# Patient Record
Sex: MALE | Race: WHITE | Employment: OTHER | URBAN - METROPOLITAN AREA
[De-identification: names, ages, dates, MRNs, and addresses within clinical notes are randomized per-mention and may not be internally consistent; named-entity substitution may affect disease eponyms.]

---

## 2017-02-21 ENCOUNTER — APPOINTMENT (OUTPATIENT)
Dept: GENERAL RADIOLOGY | Facility: HOSPITAL | Age: 82
End: 2017-02-21
Attending: EMERGENCY MEDICINE
Payer: MEDICARE

## 2017-02-21 ENCOUNTER — HOSPITAL ENCOUNTER (EMERGENCY)
Facility: HOSPITAL | Age: 82
Discharge: HOME | End: 2017-02-21
Attending: EMERGENCY MEDICINE
Payer: MEDICARE

## 2017-02-21 VITALS
BODY MASS INDEX: 22.51 KG/M2 | RESPIRATION RATE: 14 BRPM | HEIGHT: 69 IN | HEART RATE: 78 BPM | WEIGHT: 152 LBS | SYSTOLIC BLOOD PRESSURE: 119 MMHG | DIASTOLIC BLOOD PRESSURE: 71 MMHG | TEMPERATURE: 98 F | OXYGEN SATURATION: 99 %

## 2017-02-21 DIAGNOSIS — S63.614A SPRAIN OF RIGHT RING FINGER, UNSPECIFIED SITE OF FINGER, INITIAL ENCOUNTER: Primary | ICD-10-CM

## 2017-02-21 PROCEDURE — 99283 EMERGENCY DEPT VISIT LOW MDM: CPT

## 2017-02-21 PROCEDURE — 29130 APPL FINGER SPLINT STATIC: CPT

## 2017-02-21 PROCEDURE — 73140 X-RAY EXAM OF FINGER(S): CPT

## 2017-02-21 NOTE — ED PROVIDER NOTES
Patient Seen in: [facility] Emergency Department    History   Patient presents with:  Upper Extremity Injury (musculoskeletal)    Stated Complaint: broken finger    HPI    Patient presents with a right fourth digit injury.   The patient was walking h mouth 2 (two) times daily as needed. Clopidogrel Bisulfate (PLAVIX) 75 MG Oral Tab,  Take 1 tablet by mouth  daily   Magnesium 200 MG Oral Tab,  Take  by mouth. Cholecalciferol (VITAMIN D OR),  Take 1 tablet by mouth daily.    B Complex Vitamins (VITAMI views of the finger were obtained. PATIENT STATED HISTORY:  Patient was walking the dog on Sunday and got his right fourth finger caught in the leash.   He has a previous injury to his fifth finger with a pin.         2/21/2017  CONCLUSION:      No acute f

## 2017-02-21 NOTE — ED INITIAL ASSESSMENT (HPI)
Right hand fourth digit injury on Sunday. Was walking his dog and tightened the leash around his hand and dog pulled against leash and he heard a pop. Patient wishes to have finger evaluated because he has a ski trip planned.

## 2017-07-14 ENCOUNTER — HOSPITAL ENCOUNTER (OUTPATIENT)
Dept: MRI IMAGING | Facility: HOSPITAL | Age: 82
Discharge: HOME | End: 2017-07-14
Attending: INTERNAL MEDICINE
Payer: MEDICARE

## 2017-07-14 DIAGNOSIS — R41.3 MEMORY LOSS: ICD-10-CM

## 2017-07-14 PROCEDURE — 70553 MRI BRAIN STEM W/O & W/DYE: CPT | Performed by: INTERNAL MEDICINE

## 2017-07-14 PROCEDURE — A9575 INJ GADOTERATE MEGLUMI 0.1ML: HCPCS | Performed by: INTERNAL MEDICINE

## 2017-07-19 ENCOUNTER — OFFICE VISIT (OUTPATIENT)
Dept: SLEEP CENTER | Facility: HOSPITAL | Age: 82
End: 2017-07-19
Attending: INTERNAL MEDICINE
Payer: MEDICARE

## 2017-07-19 PROCEDURE — 95810 POLYSOM 6/> YRS 4/> PARAM: CPT

## 2017-07-21 NOTE — PROCEDURES
[address] [address]       Accredited by the [facility] of [facility] ([facility])    PATIENT'S NAME:        Swapnil Núñez PHYSICIAN:   Sandra Muro M.D. REFERRING PHYSICIAN:   Amanda Landrum M.D.   DEBRA predominance was noted. The oxygen timo was 83%. The patient spent 98% of the record with a saturation of greater than 90%. PERIODIC LIMB MOVEMENTS:  PLM index was 12. PLM arousal index was 2.     EEG:  With the limited recording montage, no EEG abno

## 2017-08-14 ENCOUNTER — OFFICE VISIT (OUTPATIENT)
Dept: SLEEP CENTER | Facility: HOSPITAL | Age: 82
End: 2017-08-14
Attending: DENTIST
Payer: MEDICARE

## 2017-08-14 PROCEDURE — 95811 POLYSOM 6/>YRS CPAP 4/> PARM: CPT

## 2017-11-08 PROBLEM — G47.33 OSA (OBSTRUCTIVE SLEEP APNEA): Status: ACTIVE | Noted: 2017-11-08

## 2018-06-28 ENCOUNTER — HOSPITAL ENCOUNTER (OUTPATIENT)
Age: 83
Discharge: HOME | End: 2018-06-28
Attending: FAMILY MEDICINE
Payer: MEDICARE

## 2018-06-28 VITALS
RESPIRATION RATE: 18 BRPM | OXYGEN SATURATION: 95 % | SYSTOLIC BLOOD PRESSURE: 105 MMHG | DIASTOLIC BLOOD PRESSURE: 56 MMHG | TEMPERATURE: 98 F | HEART RATE: 77 BPM

## 2018-06-28 DIAGNOSIS — J02.9 SORE THROAT: ICD-10-CM

## 2018-06-28 DIAGNOSIS — J01.00 ACUTE NON-RECURRENT MAXILLARY SINUSITIS: Primary | ICD-10-CM

## 2018-06-28 PROCEDURE — 87430 STREP A AG IA: CPT | Performed by: FAMILY MEDICINE

## 2018-06-28 PROCEDURE — 87081 CULTURE SCREEN ONLY: CPT | Performed by: FAMILY MEDICINE

## 2018-06-28 PROCEDURE — 99214 OFFICE O/P EST MOD 30 MIN: CPT

## 2018-06-28 PROCEDURE — 99204 OFFICE O/P NEW MOD 45 MIN: CPT

## 2018-06-28 RX ORDER — CODEINE PHOSPHATE AND GUAIFENESIN 10; 100 MG/5ML; MG/5ML
SOLUTION ORAL NIGHTLY PRN
Qty: 120 ML | Refills: 0 | Status: SHIPPED | OUTPATIENT
Start: 2018-06-28 | End: 2019-11-17

## 2018-06-28 RX ORDER — FLUTICASONE PROPIONATE 50 MCG
SPRAY, SUSPENSION (ML) NASAL
Qty: 1 INHALER | Refills: 0 | Status: SHIPPED | OUTPATIENT
Start: 2018-06-28 | End: 2019-12-26

## 2018-06-28 RX ORDER — CEFPROZIL 250 MG/1
250 TABLET, FILM COATED ORAL 2 TIMES DAILY
Qty: 20 TABLET | Refills: 0 | Status: SHIPPED | OUTPATIENT
Start: 2018-06-28 | End: 2019-11-17

## 2018-06-28 NOTE — ED INITIAL ASSESSMENT (HPI)
Patient presents with cc of sore throat x 5 days. +PND. No fever. Exposure to grandkids. Due to drive to [place] on Sunday for wedding.

## 2018-06-28 NOTE — ED PROVIDER NOTES
Patient Seen in: [facility] In [facility] END    History   Patient presents with:  Sore Throat    Stated Complaint: strep throat    HPI    This 81-year-old male presents to the office with a 5 day history of worsening sore throat, sinus congestio Packs/day: 1.00      Years: 15.00        Quit date: 1/1/1989  Smokeless tobacco: Former User                     Alcohol use:  Yes              Comment: occ      Review of Systems    Positive for stated complaint: strep throat  Other systems are as noted in Plan     Clinical Impression:  Acute non-recurrent maxillary sinusitis  (primary encounter diagnosis)  Sore throat    Disposition:  Discharge  6/28/2018  8:40 am    Follow-up:  April Chapman MD  [address] [facility]  [phone]

## 2019-06-27 ENCOUNTER — LAB ENCOUNTER (OUTPATIENT)
Dept: LAB | Age: 84
End: 2019-06-27
Attending: INTERNAL MEDICINE
Payer: MEDICARE

## 2019-06-27 DIAGNOSIS — R27.0 ATAXIA: ICD-10-CM

## 2019-06-27 DIAGNOSIS — R53.83 FATIGUE: ICD-10-CM

## 2019-06-27 DIAGNOSIS — R41.3 MEMORY LOSS: ICD-10-CM

## 2019-06-27 DIAGNOSIS — E78.2 MIXED HYPERLIPIDEMIA: ICD-10-CM

## 2019-06-27 DIAGNOSIS — E55.9 AVITAMINOSIS D: ICD-10-CM

## 2019-06-27 DIAGNOSIS — I25.10 CORONARY ATHEROSCLEROSIS OF NATIVE CORONARY ARTERY: Primary | ICD-10-CM

## 2019-06-27 PROCEDURE — 82746 ASSAY OF FOLIC ACID SERUM: CPT

## 2019-06-27 PROCEDURE — 84153 ASSAY OF PSA TOTAL: CPT

## 2019-06-27 PROCEDURE — 82607 VITAMIN B-12: CPT

## 2019-06-27 PROCEDURE — 80053 COMPREHEN METABOLIC PANEL: CPT

## 2019-06-27 PROCEDURE — 82306 VITAMIN D 25 HYDROXY: CPT

## 2019-06-27 PROCEDURE — 85025 COMPLETE CBC W/AUTO DIFF WBC: CPT

## 2019-06-27 PROCEDURE — 80061 LIPID PANEL: CPT

## 2019-06-27 PROCEDURE — 85652 RBC SED RATE AUTOMATED: CPT

## 2019-06-27 PROCEDURE — 84443 ASSAY THYROID STIM HORMONE: CPT

## 2019-06-27 PROCEDURE — 36415 COLL VENOUS BLD VENIPUNCTURE: CPT

## 2019-06-27 PROCEDURE — 86038 ANTINUCLEAR ANTIBODIES: CPT

## 2019-07-15 ENCOUNTER — HOSPITAL ENCOUNTER (OUTPATIENT)
Dept: MRI IMAGING | Age: 84
Discharge: HOME | End: 2019-07-15
Attending: INTERNAL MEDICINE
Payer: MEDICARE

## 2019-07-15 DIAGNOSIS — R41.3 MEMORY LOSS: ICD-10-CM

## 2019-07-15 DIAGNOSIS — R27.0 ATAXIA: ICD-10-CM

## 2019-07-15 PROCEDURE — 70551 MRI BRAIN STEM W/O DYE: CPT | Performed by: INTERNAL MEDICINE

## 2019-07-17 ENCOUNTER — APPOINTMENT (OUTPATIENT)
Dept: PHYSICAL THERAPY | Age: 84
End: 2019-07-17
Attending: INTERNAL MEDICINE
Payer: MEDICARE

## 2019-07-25 ENCOUNTER — OFFICE VISIT (OUTPATIENT)
Dept: PHYSICAL THERAPY | Age: 84
End: 2019-07-25
Attending: INTERNAL MEDICINE
Payer: MEDICARE

## 2019-07-25 DIAGNOSIS — H81.13 BENIGN PAROXYSMAL VERTIGO, BILATERAL: ICD-10-CM

## 2019-07-25 DIAGNOSIS — R27.0 ATAXIA: ICD-10-CM

## 2019-07-25 PROCEDURE — 97162 PT EVAL MOD COMPLEX 30 MIN: CPT

## 2019-07-25 PROCEDURE — 97112 NEUROMUSCULAR REEDUCATION: CPT

## 2019-07-25 NOTE — PROGRESS NOTES
VESTIBULAR EVALUATION:   Referring Physician: Dr. Jewell ref.  provider found  Date of Onset: 5 years ago  Date of Service: 7/25/2019   Diagnosis: dizziness; fall risk; balance impairments           Pt got lost so only 30 minutes for evaluation     PATIENT SUM Rolling, Bending over and Looking/reaching up  Relieves: Not moving    Dizziness Handicap Inventory [facility]): 30     Ariane Hernandez describes prior level of function has been progressively lowering his activity level 2/2 to fear of symptoms- off balance and saccadic intrusions  Saccades: Negative   Gaze Evoked Nystagmus: Negative   Head Thrust: Negative   VOR: Slow Negative, Rapid Negative   VOR Cancellation: Negative   Convergence: Negative   Cover/Uncover: Negative   Cross Cover: Negative   Head Shaking Nys Sincerely,   Electronically signed by therapist: Bakari James, PT  [de-identified] certification required: Yes   I certify the need for these services furnished under this plan of treatment and while under my care.      X__________________________________

## 2019-07-31 ENCOUNTER — OFFICE VISIT (OUTPATIENT)
Dept: PHYSICAL THERAPY | Age: 84
End: 2019-07-31
Attending: INTERNAL MEDICINE
Payer: MEDICARE

## 2019-07-31 PROCEDURE — 97110 THERAPEUTIC EXERCISES: CPT

## 2019-07-31 PROCEDURE — 97112 NEUROMUSCULAR REEDUCATION: CPT

## 2019-07-31 NOTE — PROGRESS NOTES
Dx: dizziness; fall risk; balance impairments [employer] (Authorized # of Visits):  8 requested           Authorizing Physician: Dr. Jewell ref.  provider found  Next MD visit: none scheduled  Fall Risk: standard         Precautions: n/a SPEED  Instructions: Begin walking at your normal pace (for 5 ft). When I tell you “go,” walk as fast as you can (for 5 ft). When I tell you “slow,” walk as slowly as you can (for 5 ft).   (3) Normal—Able to smoothly change walking speed without loss of bal deviates 15.24–25.4 cm (6–10 in) outside 30.48-cm (12-in) walkway width, or uses an assistive device. (1) Moderate impairment—Performs head turns with moderate change in gait velocity, slows down, deviates 25. 4–38.1 cm (10–15 in) outside 30.48-cm (12-in) impairment—Pivot turns safely in 3 seconds and stops with no loss of balance, or pivot turns safely within 3 seconds and stops with mild imbalance, requires small steps to catch balance.   (1) Moderate impairment—Turns slowly, requires verbal cueing, or req walkway width. Ambulates 6 m (20 ft) in less than 7 seconds. (2) Mild impairment—Walks 6 m (20 ft), uses assistive device, slower speed, mild gait deviations, deviates 15.24–25.4 cm (6–10 in) outside 30.48-cm (12-in) walkway width.  Ambulates 6 m (20 ft) i demonstrates both fall risk and vestibular hypofunction. He has dizziness with positional changes but HEP updated to habituate. Additionally, testing places pt at high risk for falling so education provided to reduce risk for falling at home.       Goals:

## 2019-08-07 ENCOUNTER — OFFICE VISIT (OUTPATIENT)
Dept: PHYSICAL THERAPY | Age: 84
End: 2019-08-07
Attending: INTERNAL MEDICINE
Payer: MEDICARE

## 2019-08-07 PROCEDURE — 97112 NEUROMUSCULAR REEDUCATION: CPT

## 2019-08-07 NOTE — PROGRESS NOTES
Dx: dizziness; fall risk; balance impairments [employer] (Authorized # of Visits):  8 requested           Authorizing Physician: Dr. Jewell ref.  provider found  Next MD visit: none scheduled  Fall Risk: standard         Precautions: n/a TM walking 1 min- holding on but assessing for safety  Sit to stand 10 reps x 2 sets  Seated flexion 3 reps  Walking with horizontal head turns 30'x 4 reps  Walking with Vertical head turns 30' x 4 reps  Walking forward/retro 30' x 4 reps   Sit to stands

## 2019-08-14 ENCOUNTER — OFFICE VISIT (OUTPATIENT)
Dept: PHYSICAL THERAPY | Age: 84
End: 2019-08-14
Attending: INTERNAL MEDICINE
Payer: MEDICARE

## 2019-08-14 PROCEDURE — 97112 NEUROMUSCULAR REEDUCATION: CPT

## 2019-08-14 NOTE — PROGRESS NOTES
Dx: dizziness; fall risk; balance impairments [employer] (Authorized # of Visits):  8 requested           Authorizing Physician: Dr. Jewell ref.  provider found  Next MD visit: none scheduled  Fall Risk: standard         Precautions: n/a safety  Sit to stand 10 reps x 2 sets  Seated flexion 3 reps  Walking with horizontal head turns 30'x 4 reps  Walking with Vertical head turns 30' x 4 reps  Walking forward/retro 30' x 4 reps   Sit to stands 10 reps x 2 sets  -   Neuro daria:   Walking with

## 2019-09-04 ENCOUNTER — OFFICE VISIT (OUTPATIENT)
Dept: PHYSICAL THERAPY | Age: 84
End: 2019-09-04
Attending: INTERNAL MEDICINE
Payer: MEDICARE

## 2019-09-04 PROCEDURE — 97112 NEUROMUSCULAR REEDUCATION: CPT

## 2019-09-04 NOTE — PROGRESS NOTES
Dx: dizziness; fall risk; balance impairments [employer] (Authorized # of Visits):  8 requested           Authorizing Physician: Dr. Jewell ref.  provider found  Next MD visit: none scheduled  Fall Risk: standard         Precautions: n/a Tx#: 6/   Assessment above including FGA 25 min  Nu Step level 6 - UE assist 5 min  Nu Step level 6 - UE assist 5 min - 80 STM Nu Step level 6 - UE assist 6 min - 80 STM    Neuro daria:   Chao rossi 2 x R/L  Seated flexion 5 reps   Narrow CHU on foam 2

## 2019-09-19 ENCOUNTER — OFFICE VISIT (OUTPATIENT)
Dept: PHYSICAL THERAPY | Age: 84
End: 2019-09-19
Attending: INTERNAL MEDICINE
Payer: MEDICARE

## 2019-09-19 PROCEDURE — 97112 NEUROMUSCULAR REEDUCATION: CPT

## 2019-09-19 NOTE — PROGRESS NOTES
Dx: dizziness; fall risk; balance impairments [employer] (Authorized # of Visits):  8 requested           Authorizing Physician: Dr. Jewell ref.  provider found  Next MD visit: none scheduled  Fall Risk: standard         Precautions: n/a TX#: 3/8  Date:   8/14/2019          TX#: 4/8  Date:      9/4/2019          TX#: 5/8 Date: 9/19/2019   Tx#: 6/8   Assessment above including FGA 25 min  Nu Step level 6 - UE assist 5 min  Nu Step level 6 - UE assist 5 min - 80 STM Nu Step level 6 HEP, recommendations for activities and activity modifications as needed, posture, prognosis, modalities as needed (ice vs heat, timing) Pt education: HEP update, benefits of therapy Pt education: extensive time spend on question and concerns, discussing b

## 2019-11-04 ENCOUNTER — APPOINTMENT (OUTPATIENT)
Dept: PHYSICAL THERAPY | Age: 84
End: 2019-11-04
Attending: INTERNAL MEDICINE
Payer: MEDICARE

## 2019-11-17 ENCOUNTER — APPOINTMENT (OUTPATIENT)
Dept: GENERAL RADIOLOGY | Age: 84
End: 2019-11-17
Attending: PHYSICIAN ASSISTANT
Payer: MEDICARE

## 2019-11-17 ENCOUNTER — HOSPITAL ENCOUNTER (OUTPATIENT)
Age: 84
Discharge: HOME | End: 2019-11-17
Payer: MEDICARE

## 2019-11-17 VITALS
HEART RATE: 83 BPM | BODY MASS INDEX: 25.92 KG/M2 | OXYGEN SATURATION: 96 % | TEMPERATURE: 99 F | RESPIRATION RATE: 20 BRPM | DIASTOLIC BLOOD PRESSURE: 60 MMHG | HEIGHT: 69 IN | SYSTOLIC BLOOD PRESSURE: 124 MMHG | WEIGHT: 175 LBS

## 2019-11-17 DIAGNOSIS — R05.9 COUGH: ICD-10-CM

## 2019-11-17 DIAGNOSIS — K21.9 CHRONIC GERD: Primary | ICD-10-CM

## 2019-11-17 PROCEDURE — 71046 X-RAY EXAM CHEST 2 VIEWS: CPT | Performed by: PHYSICIAN ASSISTANT

## 2019-11-17 PROCEDURE — 99214 OFFICE O/P EST MOD 30 MIN: CPT

## 2019-11-17 PROCEDURE — 99213 OFFICE O/P EST LOW 20 MIN: CPT

## 2019-11-17 RX ORDER — BENZONATATE 200 MG/1
200 CAPSULE ORAL 3 TIMES DAILY PRN
Qty: 15 CAPSULE | Refills: 0 | Status: SHIPPED | OUTPATIENT
Start: 2019-11-17 | End: 2019-11-22

## 2019-11-17 NOTE — ED INITIAL ASSESSMENT (HPI)
Pt c/o cough and chest congestion becoming progressively worse. States has been sleeping in chair for past 3 nights d/t reflex. Feels like he is \"aspirating stomach acid\". No SOB. No known fever. Denies body aches or chills.

## 2019-11-17 NOTE — ED PROVIDER NOTES
Patient Seen in: [facility] END      History   Patient presents with:  Cough    Stated Complaint: URI X 1WK    HPI    CHIEF COMPLAINT: Cough, congestion, URI symptoms, GERD    HISTORY OF PRESENT ILLNESS: Margoth Glover is a very pleasant 81 above.     Past Medical History:   Diagnosis Date   • CANCER     prostate   • Cancer [facility])     prostate   • Coronary atherosclerosis of unspecified type of vessel, native or graft    • Esophageal reflux    • Kidney stone    • Mixed hyperlipidemia    • JAMESON ( SpO2 96%   BMI 25.84 kg/m²         Physical Exam    Nursing notes and vital signs reviewed     General Appearance: alert and oriented x 4, no acute distress  Eyes:  pupils equal and round no pallor or injection  Throat:  There is no erythema or exudates, cardiac disease such as dyspnea on exertion, bilateral pedal edema, CHF. Patient will be discharged home to follow back up with her primary care physician.   I also suggested the patient follow-up with gastroenterology as his reflux may be the source of hi 0

## 2020-08-11 ENCOUNTER — APPOINTMENT (OUTPATIENT)
Dept: CT IMAGING | Facility: HOSPITAL | Age: 85
DRG: 184 | End: 2020-08-11
Attending: EMERGENCY MEDICINE
Payer: MEDICARE

## 2020-08-11 ENCOUNTER — ANESTHESIA EVENT (OUTPATIENT)
Dept: EMERGENCY DEPT | Facility: HOSPITAL | Age: 85
DRG: 184 | End: 2020-08-11
Payer: MEDICARE

## 2020-08-11 ENCOUNTER — ANESTHESIA (OUTPATIENT)
Dept: EMERGENCY DEPT | Facility: HOSPITAL | Age: 85
DRG: 184 | End: 2020-08-11
Payer: MEDICARE

## 2020-08-11 ENCOUNTER — APPOINTMENT (OUTPATIENT)
Dept: GENERAL RADIOLOGY | Facility: HOSPITAL | Age: 85
DRG: 184 | End: 2020-08-11
Attending: EMERGENCY MEDICINE
Payer: MEDICARE

## 2020-08-11 ENCOUNTER — HOSPITAL ENCOUNTER (INPATIENT)
Facility: HOSPITAL | Age: 85
LOS: 5 days | Discharge: HOME HEALTH | DRG: 184 | End: 2020-08-17
Attending: EMERGENCY MEDICINE | Admitting: FAMILY MEDICINE
Payer: MEDICARE

## 2020-08-11 DIAGNOSIS — S22.41XA CLOSED FRACTURE OF MULTIPLE RIBS OF RIGHT SIDE, INITIAL ENCOUNTER: Primary | ICD-10-CM

## 2020-08-11 LAB
ALBUMIN SERPL-MCNC: 3.7 G/DL (ref 3.4–5)
ALBUMIN/GLOB SERPL: 1.1 {RATIO} (ref 1–2)
ALP LIVER SERPL-CCNC: 100 U/L (ref 45–117)
ALT SERPL-CCNC: 55 U/L (ref 16–61)
ANION GAP SERPL CALC-SCNC: 3 MMOL/L (ref 0–18)
APTT PPP: 26.2 SECONDS (ref 25.4–36.1)
AST SERPL-CCNC: 35 U/L (ref 15–37)
BASOPHILS # BLD AUTO: 0.03 X10(3) UL (ref 0–0.2)
BASOPHILS NFR BLD AUTO: 0.2 %
BILIRUB SERPL-MCNC: 0.4 MG/DL (ref 0.1–2)
BUN BLD-MCNC: 27 MG/DL (ref 7–18)
BUN/CREAT SERPL: 22.1 (ref 10–20)
CALCIUM BLD-MCNC: 9.9 MG/DL (ref 8.5–10.1)
CHLORIDE SERPL-SCNC: 109 MMOL/L (ref 98–112)
CO2 SERPL-SCNC: 28 MMOL/L (ref 21–32)
CREAT BLD-MCNC: 1.22 MG/DL (ref 0.7–1.3)
DEPRECATED RDW RBC AUTO: 46 FL (ref 35.1–46.3)
EOSINOPHIL # BLD AUTO: 0.02 X10(3) UL (ref 0–0.7)
EOSINOPHIL NFR BLD AUTO: 0.2 %
ERYTHROCYTE [DISTWIDTH] IN BLOOD BY AUTOMATED COUNT: 12.8 % (ref 11–15)
GLOBULIN PLAS-MCNC: 3.3 G/DL (ref 2.8–4.4)
GLUCOSE BLD-MCNC: 161 MG/DL (ref 70–99)
HCT VFR BLD AUTO: 45.9 % (ref 39–53)
HGB BLD-MCNC: 15.2 G/DL (ref 13–17.5)
IMM GRANULOCYTES # BLD AUTO: 0.1 X10(3) UL (ref 0–1)
IMM GRANULOCYTES NFR BLD: 0.8 %
INR BLD: 0.97 (ref 0.89–1.11)
LYMPHOCYTES # BLD AUTO: 0.93 X10(3) UL (ref 1–4)
LYMPHOCYTES NFR BLD AUTO: 7.2 %
M PROTEIN MFR SERPL ELPH: 7 G/DL (ref 6.4–8.2)
MCH RBC QN AUTO: 32.3 PG (ref 26–34)
MCHC RBC AUTO-ENTMCNC: 33.1 G/DL (ref 31–37)
MCV RBC AUTO: 97.5 FL (ref 80–100)
MONOCYTES # BLD AUTO: 0.65 X10(3) UL (ref 0.1–1)
MONOCYTES NFR BLD AUTO: 5 %
NEUTROPHILS # BLD AUTO: 11.27 X10 (3) UL (ref 1.5–7.7)
NEUTROPHILS # BLD AUTO: 11.27 X10(3) UL (ref 1.5–7.7)
NEUTROPHILS NFR BLD AUTO: 86.6 %
OSMOLALITY SERPL CALC.SUM OF ELEC: 299 MOSM/KG (ref 275–295)
PLATELET # BLD AUTO: 230 10(3)UL (ref 150–450)
POTASSIUM SERPL-SCNC: 4.2 MMOL/L (ref 3.5–5.1)
PSA SERPL DL<=0.01 NG/ML-MCNC: 13.2 SECONDS (ref 12.4–14.6)
RBC # BLD AUTO: 4.71 X10(6)UL (ref 3.8–5.8)
SARS-COV-2 RNA RESP QL NAA+PROBE: NOT DETECTED
SODIUM SERPL-SCNC: 140 MMOL/L (ref 136–145)
WBC # BLD AUTO: 13 X10(3) UL (ref 4–11)

## 2020-08-11 PROCEDURE — 70450 CT HEAD/BRAIN W/O DYE: CPT | Performed by: EMERGENCY MEDICINE

## 2020-08-11 PROCEDURE — 71260 CT THORAX DX C+: CPT | Performed by: EMERGENCY MEDICINE

## 2020-08-11 PROCEDURE — 99205 OFFICE O/P NEW HI 60 MIN: CPT | Performed by: SURGERY

## 2020-08-11 PROCEDURE — 72072 X-RAY EXAM THORAC SPINE 3VWS: CPT | Performed by: EMERGENCY MEDICINE

## 2020-08-11 RX ORDER — MORPHINE SULFATE 4 MG/ML
4 INJECTION, SOLUTION INTRAMUSCULAR; INTRAVENOUS ONCE
Status: COMPLETED | OUTPATIENT
Start: 2020-08-11 | End: 2020-08-11

## 2020-08-11 RX ORDER — SODIUM CHLORIDE 9 MG/ML
INJECTION, SOLUTION INTRAVENOUS CONTINUOUS
Status: DISCONTINUED | OUTPATIENT
Start: 2020-08-11 | End: 2020-08-12

## 2020-08-11 RX ORDER — HYDROMORPHONE HYDROCHLORIDE 1 MG/ML
0.8 INJECTION, SOLUTION INTRAMUSCULAR; INTRAVENOUS; SUBCUTANEOUS EVERY 2 HOUR PRN
Status: DISCONTINUED | OUTPATIENT
Start: 2020-08-11 | End: 2020-08-12

## 2020-08-11 RX ORDER — PANTOPRAZOLE SODIUM 20 MG/1
20 TABLET, DELAYED RELEASE ORAL
Status: DISCONTINUED | OUTPATIENT
Start: 2020-08-12 | End: 2020-08-17

## 2020-08-11 RX ORDER — ATORVASTATIN CALCIUM 40 MG/1
40 TABLET, FILM COATED ORAL DAILY
Status: DISCONTINUED | OUTPATIENT
Start: 2020-08-11 | End: 2020-08-12

## 2020-08-11 RX ORDER — MORPHINE SULFATE 4 MG/ML
4 INJECTION, SOLUTION INTRAMUSCULAR; INTRAVENOUS EVERY 30 MIN PRN
Status: ACTIVE | OUTPATIENT
Start: 2020-08-11 | End: 2020-08-12

## 2020-08-11 RX ORDER — ONDANSETRON 2 MG/ML
4 INJECTION INTRAMUSCULAR; INTRAVENOUS EVERY 6 HOURS PRN
Status: DISCONTINUED | OUTPATIENT
Start: 2020-08-11 | End: 2020-08-11

## 2020-08-11 RX ORDER — MORPHINE SULFATE 4 MG/ML
2 INJECTION, SOLUTION INTRAMUSCULAR; INTRAVENOUS ONCE
Status: COMPLETED | OUTPATIENT
Start: 2020-08-11 | End: 2020-08-11

## 2020-08-11 RX ORDER — HYDROCODONE BITARTRATE AND ACETAMINOPHEN 5; 325 MG/1; MG/1
2 TABLET ORAL EVERY 4 HOURS PRN
Status: DISCONTINUED | OUTPATIENT
Start: 2020-08-11 | End: 2020-08-12

## 2020-08-11 RX ORDER — NALOXONE HYDROCHLORIDE 0.4 MG/ML
0.08 INJECTION, SOLUTION INTRAMUSCULAR; INTRAVENOUS; SUBCUTANEOUS
Status: DISCONTINUED | OUTPATIENT
Start: 2020-08-11 | End: 2020-08-12

## 2020-08-11 RX ORDER — DIPHENHYDRAMINE HYDROCHLORIDE 50 MG/ML
12.5 INJECTION INTRAMUSCULAR; INTRAVENOUS EVERY 4 HOURS PRN
Status: DISCONTINUED | OUTPATIENT
Start: 2020-08-11 | End: 2020-08-12

## 2020-08-11 RX ORDER — METOCLOPRAMIDE HYDROCHLORIDE 5 MG/ML
10 INJECTION INTRAMUSCULAR; INTRAVENOUS EVERY 8 HOURS PRN
Status: DISCONTINUED | OUTPATIENT
Start: 2020-08-11 | End: 2020-08-17

## 2020-08-11 RX ORDER — LISINOPRIL 10 MG/1
10 TABLET ORAL DAILY
Status: DISCONTINUED | OUTPATIENT
Start: 2020-08-11 | End: 2020-08-14

## 2020-08-11 RX ORDER — OMEGA-3-ACID ETHYL ESTERS 1 G/1
4 CAPSULE, LIQUID FILLED ORAL
Status: DISCONTINUED | OUTPATIENT
Start: 2020-08-12 | End: 2020-08-11 | Stop reason: RX

## 2020-08-11 RX ORDER — HYDROMORPHONE HYDROCHLORIDE 1 MG/ML
0.4 INJECTION, SOLUTION INTRAMUSCULAR; INTRAVENOUS; SUBCUTANEOUS EVERY 2 HOUR PRN
Status: DISCONTINUED | OUTPATIENT
Start: 2020-08-11 | End: 2020-08-12

## 2020-08-11 RX ORDER — HYDROCODONE BITARTRATE AND ACETAMINOPHEN 5; 325 MG/1; MG/1
1 TABLET ORAL EVERY 4 HOURS PRN
Status: DISCONTINUED | OUTPATIENT
Start: 2020-08-11 | End: 2020-08-12

## 2020-08-11 RX ORDER — HYDROMORPHONE HYDROCHLORIDE 1 MG/ML
0.2 INJECTION, SOLUTION INTRAMUSCULAR; INTRAVENOUS; SUBCUTANEOUS EVERY 2 HOUR PRN
Status: DISCONTINUED | OUTPATIENT
Start: 2020-08-11 | End: 2020-08-12

## 2020-08-11 RX ORDER — ACETAMINOPHEN 325 MG/1
650 TABLET ORAL EVERY 4 HOURS PRN
Status: DISCONTINUED | OUTPATIENT
Start: 2020-08-11 | End: 2020-08-13

## 2020-08-11 RX ORDER — ONDANSETRON 2 MG/ML
4 INJECTION INTRAMUSCULAR; INTRAVENOUS EVERY 6 HOURS PRN
Status: DISCONTINUED | OUTPATIENT
Start: 2020-08-11 | End: 2020-08-12

## 2020-08-11 NOTE — ED INITIAL ASSESSMENT (HPI)
Pt states fell off of a platform approximately 3-4 feet landing onto right side. C/o right sided mid back, pt states struck his head on hardwood floor, denies LOC.

## 2020-08-11 NOTE — ED PROVIDER NOTES
Patient Seen in: [facility] Emergency Department      History   Patient presents with:  Back Pain    Stated Complaint: fell around 4 ft off of some scaffolding onto a 4x4 ~ was able to walk to the [facility]    This is an 26-year-old male with past m Dr. Sergio Shaw   • OTHER SURGICAL HISTORY  1/2/15    Rt hip s/p evacuation of hematoma                    Social History    Tobacco Use      Smoking status: Former Smoker        Packs/day: 1.00        Years: 15.00        Pack years: 13        Quit date: 1/1/1989 Result Value    Glucose 161 (*)     BUN 27 (*)     BUN/CREA Ratio 22.1 (*)     Calculated Osmolality 299 (*)     GFR, Non- 54 (*)     All other components within normal limits   CBC W/ DIFFERENTIAL - Abnormal; Notable for the following comp persistent clinical concern then recommend MRI.    Dictated by (CST): Caroline Hays MD on 8/11/2020 at 5:30 PM     Finalized by (CST): Caroline Hays MD on 8/11/2020 at 5:31 PM       Ct Chest(contrast Only) (cpt=71260)    Result Date: 8/11/2020  PROCEDURE:  CT LALA medial aspect. There  may be other subtle fractures present which are nondisplaced. Consider correlation for flail chest.  Small right pleural effusion with associated basilar atelectasis. No pneumothorax is seen.   Critical results were discussed with JOSSY Finalized by (CST): Marcus Ford MD on 8/11/2020 at 6:35 PM         MDM     IV line was established of normal saline. Basic labs were obtained. CBC: White blood cell count 13. Hemoglobin 15.2. Platelet 506. Basic labs were obtained.   CBC: White blo

## 2020-08-12 ENCOUNTER — ANESTHESIA (OUTPATIENT)
Dept: CARDIOLOGY UNIT | Facility: HOSPITAL | Age: 85
DRG: 184 | End: 2020-08-12
Payer: MEDICARE

## 2020-08-12 ENCOUNTER — ANESTHESIA EVENT (OUTPATIENT)
Dept: CARDIOLOGY UNIT | Facility: HOSPITAL | Age: 85
DRG: 184 | End: 2020-08-12
Payer: MEDICARE

## 2020-08-12 LAB
ATRIAL RATE: 73 BPM
P AXIS: 47 DEGREES
P-R INTERVAL: 180 MS
Q-T INTERVAL: 362 MS
QRS DURATION: 84 MS
QTC CALCULATION (BEZET): 398 MS
R AXIS: -37 DEGREES
T AXIS: 37 DEGREES
VENTRICULAR RATE: 73 BPM

## 2020-08-12 PROCEDURE — 3E0T3BZ INTRODUCTION OF ANESTHETIC AGENT INTO PERIPHERAL NERVES AND PLEXI, PERCUTANEOUS APPROACH: ICD-10-PCS | Performed by: ANESTHESIOLOGY

## 2020-08-12 RX ORDER — DIPHENHYDRAMINE HYDROCHLORIDE 50 MG/ML
12.5 INJECTION INTRAMUSCULAR; INTRAVENOUS ONCE
Status: COMPLETED | OUTPATIENT
Start: 2020-08-12 | End: 2020-08-12

## 2020-08-12 RX ORDER — HYDROMORPHONE HYDROCHLORIDE 1 MG/ML
0.2 INJECTION, SOLUTION INTRAMUSCULAR; INTRAVENOUS; SUBCUTANEOUS EVERY 2 HOUR PRN
Status: DISCONTINUED | OUTPATIENT
Start: 2020-08-12 | End: 2020-08-16

## 2020-08-12 RX ORDER — HYDROCODONE BITARTRATE AND ACETAMINOPHEN 5; 325 MG/1; MG/1
1 TABLET ORAL EVERY 4 HOURS PRN
Status: DISCONTINUED | OUTPATIENT
Start: 2020-08-12 | End: 2020-08-12

## 2020-08-12 RX ORDER — HYDROCODONE BITARTRATE AND ACETAMINOPHEN 5; 325 MG/1; MG/1
2 TABLET ORAL EVERY 4 HOURS PRN
Status: DISCONTINUED | OUTPATIENT
Start: 2020-08-12 | End: 2020-08-13

## 2020-08-12 RX ORDER — SODIUM CHLORIDE, SODIUM LACTATE, POTASSIUM CHLORIDE, CALCIUM CHLORIDE 600; 310; 30; 20 MG/100ML; MG/100ML; MG/100ML; MG/100ML
INJECTION, SOLUTION INTRAVENOUS CONTINUOUS
Status: DISCONTINUED | OUTPATIENT
Start: 2020-08-12 | End: 2020-08-12

## 2020-08-12 RX ORDER — HYDROMORPHONE HYDROCHLORIDE 1 MG/ML
0.2 INJECTION, SOLUTION INTRAMUSCULAR; INTRAVENOUS; SUBCUTANEOUS EVERY 2 HOUR PRN
Status: DISCONTINUED | OUTPATIENT
Start: 2020-08-12 | End: 2020-08-12

## 2020-08-12 RX ORDER — CLOPIDOGREL BISULFATE 75 MG/1
75 TABLET ORAL DAILY
Status: DISCONTINUED | OUTPATIENT
Start: 2020-08-12 | End: 2020-08-14

## 2020-08-12 RX ORDER — NALOXONE HYDROCHLORIDE 0.4 MG/ML
80 INJECTION, SOLUTION INTRAMUSCULAR; INTRAVENOUS; SUBCUTANEOUS AS NEEDED
Status: ACTIVE | OUTPATIENT
Start: 2020-08-12 | End: 2020-08-12

## 2020-08-12 RX ORDER — DIPHENHYDRAMINE HYDROCHLORIDE 50 MG/ML
INJECTION INTRAMUSCULAR; INTRAVENOUS
Status: COMPLETED
Start: 2020-08-12 | End: 2020-08-12

## 2020-08-12 RX ORDER — HYDROCODONE BITARTRATE AND ACETAMINOPHEN 5; 325 MG/1; MG/1
1 TABLET ORAL EVERY 4 HOURS PRN
Status: DISCONTINUED | OUTPATIENT
Start: 2020-08-12 | End: 2020-08-13

## 2020-08-12 RX ORDER — HYDROMORPHONE HYDROCHLORIDE 1 MG/ML
0.4 INJECTION, SOLUTION INTRAMUSCULAR; INTRAVENOUS; SUBCUTANEOUS EVERY 5 MIN PRN
Status: ACTIVE | OUTPATIENT
Start: 2020-08-12 | End: 2020-08-12

## 2020-08-12 RX ORDER — ROPIVACAINE HYDROCHLORIDE 5 MG/ML
INJECTION, SOLUTION EPIDURAL; INFILTRATION; PERINEURAL AS NEEDED
Status: DISCONTINUED | OUTPATIENT
Start: 2020-08-12 | End: 2020-08-12 | Stop reason: SURG

## 2020-08-12 RX ORDER — NITROGLYCERIN 0.4 MG/1
TABLET SUBLINGUAL
Status: COMPLETED
Start: 2020-08-12 | End: 2020-08-12

## 2020-08-12 RX ORDER — HYDROMORPHONE HYDROCHLORIDE 1 MG/ML
0.4 INJECTION, SOLUTION INTRAMUSCULAR; INTRAVENOUS; SUBCUTANEOUS EVERY 2 HOUR PRN
Status: DISCONTINUED | OUTPATIENT
Start: 2020-08-12 | End: 2020-08-16

## 2020-08-12 RX ORDER — DIPHENHYDRAMINE HYDROCHLORIDE 12.5 MG/5ML
12.5 SOLUTION ORAL 4 TIMES DAILY PRN
Status: DISCONTINUED | OUTPATIENT
Start: 2020-08-12 | End: 2020-08-17

## 2020-08-12 RX ADMIN — ROPIVACAINE HYDROCHLORIDE 30 ML: 5 INJECTION, SOLUTION EPIDURAL; INFILTRATION; PERINEURAL at 12:00:00

## 2020-08-12 NOTE — PROGRESS NOTES
Acute Pain Service    80 yo with right rib fxs 4-9    Per RN, patient hallucinating this am - PCA dcd - patient received 1.6 mg Dilaudid in the past 5 hours.      He received an erector spinae block last night at 9pm by Dr. Cody Carreno and is complaining of sever

## 2020-08-12 NOTE — ANESTHESIA POSTPROCEDURE EVALUATION
[address] Patient Status:  Observation   Age/Gender 81year old male MRN [id number]   [facility] 8NE-A Attending Chanelle Sigala MD   Hosp Day # 0 PCP Corey Torres MD       Anesthesia Post-op Note    * No proced

## 2020-08-12 NOTE — CONSULTS
[facility] Pharmacy Note:  Pain Consult    Nay Feng is a 81year old patient started on Dilaudid PCA by Dr. Constanza Dumont. Pharmacy was consulted to review medication profile and to discontinue previously ordered narcotics and sedatives.     Medication profile

## 2020-08-12 NOTE — H&P
[facility]  Report of  Surgical Consultation with History and Physical Exam    Pisano French Patient Status:  Emergency    3/23/1935 MRN [id number]   Location [address] [facility] Attending Lazaro Nurse, [address] [facility] Day # 0 PCP Um PSG/8/14/17-TX    AHI-33, supine AHI-48, lateral AHI-3/CPAP-10cwp   • Other and unspecified personal history of malignant neoplasm     prostate   • Transient global amnesia    • Unspecified essential hypertension    • Unspecified transient cerebral ischemi diarrhea and vomiting  Genitourinary:  Negative for dysuria and hematuria  Hema/Lymph:  Negative for easy bleeding and easy bruising  Integumentary:  Negative for pruritus and rash  Musculoskeletal: P positive for back pain.  negative for bone/joint symptom 54   BILT 0.4   TP 7.0         Recent Labs   Lab 08/11/20  1634   PTP 13.2   INR 0.97   PTT 26.2         Impression:  Patient Active Problem List:     Mixed hyperlipidemia     Coronary atherosclerosis     History of prostate cancer     History of TIA (casillas arising under his own power. He was transported to the hospital by EMS and he denies shortness of breath or dyspnea but states that he has severe pain on the right aspect of his thorax which is intensified by deep breathing.   The patient has no other pain locations; concern for flail chest.  3. Pharmacologic anticoagulation with Plavix due to coronary artery disease    · No acute surgical intervention required. · The patient will be admitted for monitoring, pulmonary toilet and pain control.   · I have aske

## 2020-08-12 NOTE — PROGRESS NOTES
Dr. Sneha Mullins and Lino Philippe and Sola Celis at [facility] to perform block.

## 2020-08-12 NOTE — PLAN OF CARE
NURSING ADMISSION NOTE      Patient admitted via CART. Oriented to room. Safety precautions initiated. Bed in low position. Call light in reach. ADMITTED AN 85 YRS OLD MALE FROM ER ALERT AND ORIENTED X4 WITH RIB FRACTURE.  PLACED ON BED COMFORTABLY

## 2020-08-12 NOTE — H&P
[address] [facility] History & Physical    Deepti Fletcher Patient Status:  Observation    3/23/1935 MRN [id number]   [facility] 8NE-A Attending Cheryle Grace MD   Hosp Day # 0 PCP Reinaldo Patten MD     History of Pres drinks of alcohol per week.     Allergies:  No Known Allergies    Home Medications:  PANTOPRAZOLE SODIUM 20 MG Oral Tab EC, TAKE 1 TABLET BY MOUTH 2  TIMES DAILY BEFORE A MEAL, Disp: 180 tablet, Rfl: 0, 8/10/2020 at Unknown time  CLOPIDOGREL BISULFATE 75 MG bilaterally, respirations unlabored but shallow   Chest wall:    Very tender Rt lower chest wall   Heart:    Regular rate and rhythm, S1 and S2 normal, no murmur, rub   or gallop   Abdomen:     Soft, non-tender, bowel sounds active all four quadrants,

## 2020-08-12 NOTE — PAYOR COMM NOTE
--------------  Appropriate for inpatient status per guidelines for fall with multiple traumatic rib fractures, concern for flail chest and initiation of PCA. Also with small pleural effusion.         ADMISSION REVIEW     Payor: [employer] MEDICARE IRRIGATION & DEBRIDEMENT Right 1/2/2015    Performed by Gatito Duran MD at [facility] MAIN OR   • OTHER SURGICAL HISTORY  5-1-08    brachytherapy of prostate   • OTHER SURGICAL HISTORY  7/30/12    cysto, lt urs, litho, stone extraction of fragments, rpg, lt zamzam for the following components:       Result Value    Glucose 161 (*)     BUN 27 (*)     BUN/CREA Ratio 22.1 (*)     Calculated Osmolality 299 (*)     GFR, Non- 54 (*)     All other components within normal limits   CBC W/ DIFFERENTIAL - Abno intravenous contrast material. Dose reduction techniques were used. Dose information is transmitted to the [facility] [employer] of Radiology) NRDR ([address]) which includes the Dose Index Registry.   PATIENT STATED HISTORY:(As trans PM             Ct Brain Or Head (80302)    Result Date: 8/11/2020  PROCEDURE:  CT BRAIN OR HEAD (10859)  COMPARISON:  MR ROM, MRI BRAIN (CPT=70551), 7/15/2019, 8:34 AM.  INDICATIONS:  fell around 4 ft off of some scaffolding onto a 4x4 ~ was able pneumothorax. Patient is fairly uncomfortable. He was additionally given more morphine. I discussed case with Dr. Davonte James from general surgery. He will evaluate the patient in the morning. He requested pain service consult.   I did contact anesthesi and ribs five through ten on medial aspect. There is a small right pleural effusion. CT brain showed no acute intracranial process.     The patient's medical history is significant for obstructive sleep apnea, prostate cancer, coronary artery disease at o accessory respiratory musculature. Mildly tender to palpation over right anterior ribs. Back: Tenderness palpation noted over the posterior ribs and thoracic spine. Cardiac: Regular rate and rhythm. No murmur.     Abdomen: Soft, nondistended, nontende in the presence of my physician assistant who scribed this encounter; I obtained and performed the above history, physical examination, assessment and plan. I have I have edited the above to reflect my evaluation, opinion, and physical exam findings.     P No cervical orthosis is in place at the time of my evaluation. Chest: Symmetrically clear but distant breath sounds on the right. The patient has difficulty with deep inspiration.   There is tenderness to palpation of the right thorax laterally and poster currently off the floor for his erector spinae plane block. Per nursing notes the patient has been utilizing his Dilaudid PCA for pain. He was having some confusion as well as hallucinations.   Further management of the patient's pain per acute pain servi (36.6 °C) 76 14 119/70 95 % 175 lb None (Room air)

## 2020-08-12 NOTE — ED NOTES
Call to floor for report = now inpt bed is reassigned to a \"dirty bed\"  Receiveing RN cannot take report and housekeeping has not started to clean the new inpt bed. Call to Carla/nurs sup = notified for clean bed.

## 2020-08-12 NOTE — ANESTHESIA POSTPROCEDURE EVALUATION
[address] [facility] Patient Status:  Emergency   Age/Gender 81year old male MRN [id number]   Location [address] Attending Brigida Winter, [address] [facility] Day # 0 PCP Veronica Perez MD       Anesthesia Post-op Note    *

## 2020-08-12 NOTE — PHYSICAL THERAPY NOTE
PHYSICAL THERAPY EVALUATION - INPATIENT     Room Number: 5073/3469-F  Evaluation Date: 8/12/2020  Type of Evaluation: Initial  Physician Order: PT Eval and Treat    Presenting Problem: Closed fracture of multiple ribs of R side  Reason for Therapy: Mobil litho, stone extraction of fragments, rpg, lt stent placement- Dr. Delfin Saravia   • OTHER SURGICAL HISTORY  8/3/12    cysto stent removal- Dr. Delfin Saravia   • OTHER SURGICAL HISTORY  1/2/15    Rt hip s/p evacuation of hematoma       HOME SITUATION  Type of Home: [address] [address] (including adjusting bedclothes, sheets and blankets)?: A Lot   -   Sitting down on and standing up from a chair with arms (e.g., wheelchair, bedside commode, etc.): A Little   -   Moving from lying on back to sitting on the side of the bed?: A Lot   How m relationship to his wife and suspected hallucinations. Pain service consulted. Exercise/Education Provided:  Bed mobility  Functional activity tolerated  Gait training  Posture  ROM  Transfer training    Patient End of Session: Needs met; In bed;Call li supine - sit EOB @ level: supervision     Goal #2 Patient is able to demonstrate transfers Sit to/from Stand at assistance level: supervision     Goal #3 Patient is able to ambulate 150 feet with assist device: walker - rolling at assistance level: supervi

## 2020-08-12 NOTE — ED NOTES
Pt and family updated on inpt bed assignment: call to floor for reprort to [facility] = they will call us back to received report.

## 2020-08-12 NOTE — PROGRESS NOTES
Patient given Benadryl IV per order from Dr. Sebas Slater for mild itching. Per Dr. Sebas Slater, patient may transfer back to room at any time.

## 2020-08-12 NOTE — RESPIRATORY THERAPY NOTE
Pt is on JAMESON protocol but does not want a cpap. RN aware. Will monitor over night with pulse ox.

## 2020-08-12 NOTE — PLAN OF CARE
Breakfast was ordered at this time        Joselin Mcdonald RN  11/07/18 3897 Received patient at 0730. Alert and Oriented x4. Tele Rhythm NSR. O2 saturation 92% On room air, O2 PRN when pt sleepy from dilaudid PCA. Breath sounds diminished but clear. IS encouraged, pt instructed and reaching 1000. Bed is locked and in low position. antiarrhythmic and heart rate control medications as ordered  - Initiate emergency measures for life threatening arrhythmias  - Monitor electrolytes and administer replacement therapy as ordered  Outcome: Progressing     Problem: RESPIRATORY - ADULT  Goal:

## 2020-08-12 NOTE — ANESTHESIA PREPROCEDURE EVALUATION
PRE-OP EVALUATION    Patient Name: Benjamin Gonzalez    Pre-op Diagnosis: right rib fracture 4-9    Erector spinae block with ultrasound      Pre-op vitals reviewed.   Temp: 97.8 °F (36.6 °C)  Pulse: 75  Resp: 17  BP: 144/72  SpO2: 94 %  Body mass index is 26 tobacco: Former User    Alcohol use: Yes      Comment: occ      Drug use: No     Available pre-op labs reviewed. Lab Results   Component Value Date    WBC 13.0 (H) 08/11/2020    RBC 4.71 08/11/2020    HGB 15.2 08/11/2020    HCT 45.9 08/11/2020    MCV 97. 5

## 2020-08-12 NOTE — ANESTHESIA PREPROCEDURE EVALUATION
PRE-OP EVALUATION    Patient Name: Joel Ho    Pre-op Diagnosis: right rib fracture 4-9    Erector spinae catheter with ultrasound      Pre-op vitals reviewed.   Temp: 97.6 °F (36.4 °C)  Pulse: 96  Resp: 16  BP: 147/63  SpO2: 92 %  There is no height 1/2/2015    Performed by El Summers MD at [facility] MAIN OR   • OTHER SURGICAL HISTORY  5-1-08    brachytherapy of prostate   • OTHER SURGICAL HISTORY  7/30/12    cysto, lt urs, litho, stone extraction of fragments, rpg, lt stent placement- Dr. Shahzad Timmons   • OTHER complications such as local anesthetic systemic toxicity (LAST), infection, hematoma formation, catheter dislodgment and breakage, and permanent nerve damage was also discussed. All questions were answered and patient agreed to proceed.  Consent was signed

## 2020-08-12 NOTE — ANESTHESIA PROCEDURE NOTES
Erector Spinae Plane Block  Performed by: Ilan Reyes MD  Authorized by: Ilan Reyes MD       General Information and Staff    Start Time:  8/12/2020 11:50 AM  End Time:  8/12/2020 12:00 PM  Anesthesiologist:  Ilan Reyes MD  Performed by:   Anesthesi

## 2020-08-12 NOTE — OCCUPATIONAL THERAPY NOTE
OCCUPATIONAL THERAPY EVALUATION - INPATIENT     Room Number: 0134/6636-H  Evaluation Date: 8/12/2020  Type of Evaluation: Initial  Presenting Problem: fall, rib fractures    Physician Order: IP Consult to Occupational Therapy  Reason for Therapy: ADL/IADL litho, stone extraction of fragments, rpg, lt stent placement- Dr. Caron Stafford   • [address]  8/3/12    cysto stent removal- Dr. Caron Stafford   • OTHER SURGICAL HISTORY  1/2/15    Rt hip s/p evacuation of hematoma       OCCUPATIONAL PROFILE    HOME SITUATION  Ty Putting on and taking off regular lower body clothing?: A Lot  -   Bathing (including washing, rinsing, drying)?: A Lot  -   Toileting, which includes using toilet, bedpan or urinal? : A Lot  -   Putting on and taking off regular upper body clothing?: A Lo deficits, maximizing patient’s ability to return safely to his prior level of function.     Patient Complexity  Occupational Profile/Medical History MODERATE - Expanded review of history including review of medical or therapy record   Specific performance d

## 2020-08-12 NOTE — PROGRESS NOTES
Attempted to see patient early this afternoon. The patient is currently off the floor for his erector spinae plane block. Per nursing notes the patient has been utilizing his Dilaudid PCA for pain. He was having some confusion as well as hallucinations.

## 2020-08-13 ENCOUNTER — TELEPHONE (OUTPATIENT)
Dept: CARDIAC SURGERY | Facility: HOSPITAL | Age: 85
End: 2020-08-13

## 2020-08-13 LAB
ALBUMIN SERPL-MCNC: 2.9 G/DL (ref 3.4–5)
ALBUMIN/GLOB SERPL: 0.8 {RATIO} (ref 1–2)
ALP LIVER SERPL-CCNC: 93 U/L (ref 45–117)
ALT SERPL-CCNC: 44 U/L (ref 16–61)
ANION GAP SERPL CALC-SCNC: 4 MMOL/L (ref 0–18)
AST SERPL-CCNC: 28 U/L (ref 15–37)
BASOPHILS # BLD AUTO: 0.03 X10(3) UL (ref 0–0.2)
BASOPHILS NFR BLD AUTO: 0.3 %
BILIRUB SERPL-MCNC: 0.8 MG/DL (ref 0.1–2)
BUN BLD-MCNC: 21 MG/DL (ref 7–18)
BUN/CREAT SERPL: 23.6 (ref 10–20)
CALCIUM BLD-MCNC: 8.8 MG/DL (ref 8.5–10.1)
CHLORIDE SERPL-SCNC: 108 MMOL/L (ref 98–112)
CO2 SERPL-SCNC: 26 MMOL/L (ref 21–32)
CREAT BLD-MCNC: 0.89 MG/DL (ref 0.7–1.3)
DEPRECATED RDW RBC AUTO: 47.3 FL (ref 35.1–46.3)
EOSINOPHIL # BLD AUTO: 0.05 X10(3) UL (ref 0–0.7)
EOSINOPHIL NFR BLD AUTO: 0.5 %
ERYTHROCYTE [DISTWIDTH] IN BLOOD BY AUTOMATED COUNT: 13.2 % (ref 11–15)
GLOBULIN PLAS-MCNC: 3.6 G/DL (ref 2.8–4.4)
GLUCOSE BLD-MCNC: 123 MG/DL (ref 70–99)
HCT VFR BLD AUTO: 43.8 % (ref 39–53)
HGB BLD-MCNC: 14.8 G/DL (ref 13–17.5)
IMM GRANULOCYTES # BLD AUTO: 0.04 X10(3) UL (ref 0–1)
IMM GRANULOCYTES NFR BLD: 0.4 %
LYMPHOCYTES # BLD AUTO: 1.06 X10(3) UL (ref 1–4)
LYMPHOCYTES NFR BLD AUTO: 10.1 %
M PROTEIN MFR SERPL ELPH: 6.5 G/DL (ref 6.4–8.2)
MCH RBC QN AUTO: 32.7 PG (ref 26–34)
MCHC RBC AUTO-ENTMCNC: 33.8 G/DL (ref 31–37)
MCV RBC AUTO: 96.7 FL (ref 80–100)
MONOCYTES # BLD AUTO: 0.6 X10(3) UL (ref 0.1–1)
MONOCYTES NFR BLD AUTO: 5.7 %
NEUTROPHILS # BLD AUTO: 8.72 X10 (3) UL (ref 1.5–7.7)
NEUTROPHILS # BLD AUTO: 8.72 X10(3) UL (ref 1.5–7.7)
NEUTROPHILS NFR BLD AUTO: 83 %
OSMOLALITY SERPL CALC.SUM OF ELEC: 290 MOSM/KG (ref 275–295)
PLATELET # BLD AUTO: 185 10(3)UL (ref 150–450)
POTASSIUM SERPL-SCNC: 3.6 MMOL/L (ref 3.5–5.1)
POTASSIUM SERPL-SCNC: 4.3 MMOL/L (ref 3.5–5.1)
RBC # BLD AUTO: 4.53 X10(6)UL (ref 3.8–5.8)
SODIUM SERPL-SCNC: 138 MMOL/L (ref 136–145)
TROPONIN I SERPL-MCNC: <0.045 NG/ML (ref ?–0.04)
WBC # BLD AUTO: 10.5 X10(3) UL (ref 4–11)

## 2020-08-13 PROCEDURE — 99233 SBSQ HOSP IP/OBS HIGH 50: CPT | Performed by: SURGERY

## 2020-08-13 RX ORDER — POLYETHYLENE GLYCOL 3350 17 G/17G
17 POWDER, FOR SOLUTION ORAL DAILY
Status: DISCONTINUED | OUTPATIENT
Start: 2020-08-13 | End: 2020-08-17

## 2020-08-13 RX ORDER — OXYCODONE HYDROCHLORIDE 5 MG/1
5 TABLET ORAL EVERY 4 HOURS PRN
Status: DISCONTINUED | OUTPATIENT
Start: 2020-08-13 | End: 2020-08-17

## 2020-08-13 RX ORDER — TIZANIDINE 2 MG/1
2 TABLET ORAL EVERY 6 HOURS PRN
Status: DISCONTINUED | OUTPATIENT
Start: 2020-08-13 | End: 2020-08-14

## 2020-08-13 RX ORDER — OXYCODONE HYDROCHLORIDE 5 MG/1
10 TABLET ORAL EVERY 4 HOURS PRN
Status: DISCONTINUED | OUTPATIENT
Start: 2020-08-13 | End: 2020-08-17

## 2020-08-13 RX ORDER — KETOROLAC TROMETHAMINE 15 MG/ML
15 INJECTION, SOLUTION INTRAMUSCULAR; INTRAVENOUS EVERY 6 HOURS PRN
Status: DISPENSED | OUTPATIENT
Start: 2020-08-13 | End: 2020-08-15

## 2020-08-13 RX ORDER — ACETAMINOPHEN 10 MG/ML
1000 INJECTION, SOLUTION INTRAVENOUS EVERY 6 HOURS PRN
Status: DISCONTINUED | OUTPATIENT
Start: 2020-08-13 | End: 2020-08-14

## 2020-08-13 RX ORDER — POTASSIUM CHLORIDE 20 MEQ/1
40 TABLET, EXTENDED RELEASE ORAL EVERY 4 HOURS
Status: COMPLETED | OUTPATIENT
Start: 2020-08-13 | End: 2020-08-13

## 2020-08-13 NOTE — PLAN OF CARE
AOx4. Calm, cooperative. NSR on cardiac monitor. Adequate saturation on RA at day time, 2L of O2 to sleep. Lung sounds clear/ Diminished. Denies sob, n/v. Denies pain when moving. Voiding in urinal. LBM 8/11. Hourly and prn rounding. Call light in reach. threatening arrhythmias  - Monitor electrolytes and administer replacement therapy as ordered  Outcome: Progressing     Problem: RESPIRATORY - ADULT  Goal: Achieves optimal ventilation and oxygenation  Description  INTERVENTIONS:  - Assess for changes in r use of  RW for transfers and ambulation   Outcome: Progressing     Problem: Impaired Activities of Daily Living  Goal: Achieve highest/safest level of independence in self care  Description  Interventions:  - Assess ability and encourage patient to partici

## 2020-08-13 NOTE — PROGRESS NOTES
[facility]  Progress Note    10-20 Media Patient Status:  Inpatient    3/23/1935 MRN [id number]   [facility] 8NE-A Attending Bayron Agrawal MD   Hosp Day # 1 PCP Jaiden Martinez MD     Subjective:   The patient is resting in be ischemic attack)     JAMESON (obstructive sleep apnea)     Multiple closed fractures of ribs of right side     Closed fracture of multiple ribs of right side, initial encounter    Right sided rib fractures following fall from scaffold  Attempt at erector spina scaffold. Flail chest.    · We will add scheduled Ofirmev to the patient's pain regimen to optimize pain control. · Scheduled Toradol and or ibuprofen to optimize pain control. · Rosebud Jarvis will be switched to oxycodone to decrease acetaminophen burden.   · I

## 2020-08-13 NOTE — PROGRESS NOTES
[facility]    Progress Note    Emilee Pritchett Patient Status:  Inpatient    3/23/1935 MRN [id number]   [facility] 8NE-A Attending Colleen Montanez MD   Hosp Day # 1 PCP Erinn [place] MD       SUBJECTIVE:  Continues to have pain tab 10 mg, 10 mg, Oral, Daily  Pantoprazole Sodium (PROTONIX) EC tab 20 mg, 20 mg, Oral, BID AC  acetaminophen (TYLENOL) tab 650 mg, 650 mg, Oral, Q4H PRN  Metoclopramide HCl (REGLAN) injection 10 mg, 10 mg, Intravenous, Q8H PRN          Assessment  Patien

## 2020-08-13 NOTE — PROGRESS NOTES
Acute Pain Service - Right rib fxs 4-9    Status Post Nerve Block:  Type of Nerve Block: Right erector spinae  Single Injection Nerve Block    Post op review: No evidence of immediate block related complications and No paresthesia noted    Assessment  Repo

## 2020-08-13 NOTE — PROGRESS NOTES
0030   Pt. Had a severe sharp chest pain , 1 NTG SL given , no relief. EKG shows NSR. 2 Norco given with relief. Trop negative. Applied O2 at 2 li for comfort. Pt. With comminuted Fx 4th to 9 th ribs. Alert and o x 4 , not in apparent distress.

## 2020-08-13 NOTE — PLAN OF CARE
8/13/2020    A&Ox4  2L NC for comfort- RA is baseline  NSR on tele  Denies any sob  Per pt- wants pain meds Q4 for pain   PT/OT  Normally up ad yuly but per pt- to painful to walk    Norco does seem to relieve the pain temporarily   Encouraged pt to use IS

## 2020-08-14 LAB
ATRIAL RATE: 86 BPM
EST. AVERAGE GLUCOSE BLD GHB EST-MCNC: 120 MG/DL (ref 68–126)
HBA1C MFR BLD HPLC: 5.8 % (ref ?–5.7)
P AXIS: 49 DEGREES
P-R INTERVAL: 166 MS
Q-T INTERVAL: 348 MS
QRS DURATION: 82 MS
QTC CALCULATION (BEZET): 416 MS
R AXIS: -25 DEGREES
T AXIS: 48 DEGREES
VENTRICULAR RATE: 86 BPM

## 2020-08-14 PROCEDURE — 99233 SBSQ HOSP IP/OBS HIGH 50: CPT | Performed by: SURGERY

## 2020-08-14 RX ORDER — ACETAMINOPHEN 325 MG/1
650 TABLET ORAL
Status: DISCONTINUED | OUTPATIENT
Start: 2020-08-14 | End: 2020-08-17

## 2020-08-14 RX ORDER — TIZANIDINE 2 MG/1
2 TABLET ORAL EVERY 8 HOURS PRN
Status: DISCONTINUED | OUTPATIENT
Start: 2020-08-14 | End: 2020-08-17

## 2020-08-14 RX ORDER — GABAPENTIN 100 MG/1
100 CAPSULE ORAL 3 TIMES DAILY
Status: DISCONTINUED | OUTPATIENT
Start: 2020-08-14 | End: 2020-08-17

## 2020-08-14 RX ORDER — LISINOPRIL 5 MG/1
5 TABLET ORAL DAILY
Status: DISCONTINUED | OUTPATIENT
Start: 2020-08-15 | End: 2020-08-14

## 2020-08-14 NOTE — PROGRESS NOTES
[facility]  Progress Note    Denilson Montanez Patient Status:  Inpatient    3/23/1935 MRN [id number]   [facility] 8NE-A Attending Dona Glasgow MD   Hosp Day # 2 PCP Dori Sheppard MD     Subjective:   The patient is resting in be be an issue. Will continue current pain regimen  3. Encourage ambulation and out of bed to chair  4. Continue IS   5. GI prophylaxis  6. DVT prophylaxis  7.  Discharge planning- patient may require home PT or BENITO    My total face time with this patient was BENITO.  · The patient was provided ample opportunity to ask questions. · All of the patient's questions were answered in detail. · The patient voiced understanding of the care plan.     I, Dr. Azael Winkler, personally performed the services described in

## 2020-08-14 NOTE — CONSULTS
[facility] Cardiology Consultation    Emilee Pritchett Patient Status:  Inpatient    3/23/1935 MRN [id number]   [facility] 8NE-A Attending Colleen Montanez MD   Hosp Day # 2 PCP Erinn [place] MD     Reason for Consultation:  Lower blood pressu Family History   Problem Relation Age of Onset   • Cancer Sister         esophageal/stomach ca   • Cancer Brother         prostate ca   • Heart Disorder Brother         cabg   • Cancer Sister         breast ca   • Cancer Sister         breast ca   • Ca 08/13/20 2012    138  --    K 4.2 3.6 4.3    108  --    CO2 28.0 26.0  --    BUN 27* 21*  --    CREATSERUM 1.22 0.89  --    CA 9.9 8.8  --    * 123*  --        Recent Labs   Lab 08/11/20  1634 08/13/20  1051   ALT 55 44   AST 35 28   AL

## 2020-08-14 NOTE — CM/SW NOTE
08/14/20 1200   CM/SW Referral Data   Referral Source Physician   Reason for Referral Discharge planning   Informant Patient; Children   Patient Info   Patient's Mental Status Alert;Oriented   Patient's [address]   Number of Levels in Home 1

## 2020-08-14 NOTE — PHYSICAL THERAPY NOTE
PHYSICAL THERAPY TREATMENT NOTE - INPATIENT    Room Number: 4689/0909-G     Session: 1  Number of Visits to Meet Established Goals: 5    Presenting Problem: Closed fracture of multiple ribs of R side     History related to current admission: Pt is 85 year HISTORY  8/3/12    cysto stent removal- Dr. Carlos Sloan   • OTHER SURGICAL HISTORY  1/2/15    Rt hip s/p evacuation of hematoma       SUBJECTIVE  \"Lets get it over with. \"     Patient’s self-stated goal is to improve pain.      OBJECTIVE       WEIGHT BEARING RESTRI RW and CGA. Pt ambulated 150ft with RW and CGA for safety. Pt ambulates with very slow tentative eugenio due to pain. Pt demonstrates good safety awareness without LOB.  Towards end of ambulation, patient reports increasing pain and declines trial of commod 8/12/2020; goals in progress 8/14/2020

## 2020-08-14 NOTE — PLAN OF CARE
Assumed care of pt around 1930. Pt Aox4. 2L NC. VSS. Prn medication given for pain. Severe pain with position changes. Pt had small bowel movement tonight, passing gas frequently. Spoke with wife and son overnight, updated on patient status.  Patient Paula Valverde medications as ordered  - Initiate emergency measures for life threatening arrhythmias  - Monitor electrolytes and administer replacement therapy as ordered  Outcome: Progressing     Problem: RESPIRATORY - ADULT  Goal: Achieves optimal ventilation and oxyg patient/family in tolerated activity level and precautions  - Recommend use of  RW for transfers and ambulation   Outcome: Progressing     Problem: Impaired Activities of Daily Living  Goal: Achieve highest/safest level of independence in self care  Descri

## 2020-08-14 NOTE — OCCUPATIONAL THERAPY NOTE
OCCUPATIONAL THERAPY TREATMENT NOTE - INPATIENT     Room Number: 1440/3239-T  Session: 1   Number of Visits to Meet Established Goals: 5    Presenting Problem: fall, rib fractures    History related to current admission:  Pt is 81year old male admitted on cysto stent removal- Dr. Carlos Sloan   • OTHER SURGICAL HISTORY  1/2/15    Rt hip s/p evacuation of hematoma         OBJECTIVE       WEIGHT BEARING RESTRICTION  Weight Bearing Restriction: None                PAIN ASSESSMENT  Rating: Unable to rate  Location: ribs mask and gloves. Patient End of Session: Up in chair;Needs met;Call light within reach; All patient questions and concerns addressed; Family present    ASSESSMENT   Pt demonstrated improved standing endurance and balance.  Current limitations: rib/trunk d

## 2020-08-14 NOTE — PROGRESS NOTES
Acute Pain Service - Right rib fxs 4-9    Interval Hx  - right erector spinae block x 2 (8/11 and 8/12); unable to get continuous catheter  - confused and hallucinating on Dilaudid PCA (8/12)    Assessment  Assessed pt sitting [facility]

## 2020-08-14 NOTE — PLAN OF CARE
Received patient at 1600. Alert and Oriented x4, fatigued. Tele Rhythm NSR. O2 saturation WNL on room air. Breath sounds clear, diminished at bases. Encouraging IS (1250). Fall precautions in place. Bed is locked and in low position.  Call light and persona

## 2020-08-14 NOTE — PROGRESS NOTES
[facility]    Progress Note    Evangelista Chao Patient Status:  Inpatient    3/23/1935 MRN [id number]   [facility] 8NE-A Attending Moses Negrete MD   Hosp Day # 2 PCP Brenda Kaplan MD       SUBJECTIVE:  Continues to have pain cap/tab 10 mg, 10 mg, Oral, Q4H PRN  diphenhydrAMINE (BENADRYL) 12.5 MG/5ML oral liquid 12.5 mg, 12.5 mg, Oral, QID PRN  Clopidogrel Bisulfate (PLAVIX) tab 75 mg, 75 mg, Oral, Daily  HYDROmorphone HCl (DILAUDID) 1 MG/ML injection 0.2 mg, 0.2 mg, Intravenou

## 2020-08-14 NOTE — CONSULTS
Thoracic Surgery Consult      Name: Shweta Toledo   Age: 81year old   Sex: male. MRN: [id number]    Reason for Consultation: flail chest    Consulting Physician: Neil Diallo    Subjective:      Chief Complaint: \" chest pain.  \"    History of Pres 5-1-08    brachytherapy of prostate   • OTHER SURGICAL HISTORY  7/30/12    cysto, lt urs, litho, stone extraction of fragments, rpg, lt stent placement- Dr. Apollo Saavedra   • [address]  8/3/12    cysto stent removal- Dr. Kristofer Perez  1/ lateralizing weakness  Neuro: No gross cranial nerve defects, no loss of sensation  Psych: oriented to person place and time, normal mood and affect    Radiology:      CT chest 8/11/20  FINDINGS:    There are fractures involving the right fourth through ni to be held for 5-7 days prior to surgery. Plan to hold Plavix today, discussed with Dr. Luis Miguel Deal.  If after 5-7 days, severe pain is persistent and is inhibiting discharge, and pain is consistent with areas of displacement noted on CT, we will evaluate him

## 2020-08-14 NOTE — PROGRESS NOTES
Received patient alert and oriented  Patient with right rib/back/chest pain with any movement. Medicated per [facility] this am and patient was able to get up to chair with contact guard assistance.   Tolerated chair for breakfast and lunch  NSR on monitor  Lungs

## 2020-08-15 ENCOUNTER — APPOINTMENT (OUTPATIENT)
Dept: GENERAL RADIOLOGY | Facility: HOSPITAL | Age: 85
DRG: 184 | End: 2020-08-15
Attending: INTERNAL MEDICINE
Payer: MEDICARE

## 2020-08-15 LAB
ALBUMIN SERPL-MCNC: 2.5 G/DL (ref 3.4–5)
ALBUMIN/GLOB SERPL: 0.7 {RATIO} (ref 1–2)
ALP LIVER SERPL-CCNC: 88 U/L (ref 45–117)
ALT SERPL-CCNC: 37 U/L (ref 16–61)
ANION GAP SERPL CALC-SCNC: 5 MMOL/L (ref 0–18)
AST SERPL-CCNC: 23 U/L (ref 15–37)
BASOPHILS # BLD AUTO: 0.03 X10(3) UL (ref 0–0.2)
BASOPHILS NFR BLD AUTO: 0.3 %
BILIRUB DIRECT SERPL-MCNC: 0.2 MG/DL (ref 0–0.2)
BILIRUB SERPL-MCNC: 0.8 MG/DL (ref 0.1–2)
BUN BLD-MCNC: 32 MG/DL (ref 7–18)
BUN/CREAT SERPL: 28.8 (ref 10–20)
CALCIUM BLD-MCNC: 9.2 MG/DL (ref 8.5–10.1)
CHLORIDE SERPL-SCNC: 106 MMOL/L (ref 98–112)
CO2 SERPL-SCNC: 25 MMOL/L (ref 21–32)
CREAT BLD-MCNC: 1.11 MG/DL (ref 0.7–1.3)
DEPRECATED RDW RBC AUTO: 45.9 FL (ref 35.1–46.3)
EOSINOPHIL # BLD AUTO: 0.24 X10(3) UL (ref 0–0.7)
EOSINOPHIL NFR BLD AUTO: 2.3 %
ERYTHROCYTE [DISTWIDTH] IN BLOOD BY AUTOMATED COUNT: 13.2 % (ref 11–15)
GLOBULIN PLAS-MCNC: 3.8 G/DL (ref 2.8–4.4)
GLUCOSE BLD-MCNC: 102 MG/DL (ref 70–99)
HCT VFR BLD AUTO: 44 % (ref 39–53)
HGB BLD-MCNC: 14.6 G/DL (ref 13–17.5)
IMM GRANULOCYTES # BLD AUTO: 0.03 X10(3) UL (ref 0–1)
IMM GRANULOCYTES NFR BLD: 0.3 %
LYMPHOCYTES # BLD AUTO: 0.89 X10(3) UL (ref 1–4)
LYMPHOCYTES NFR BLD AUTO: 8.6 %
M PROTEIN MFR SERPL ELPH: 6.3 G/DL (ref 6.4–8.2)
MCH RBC QN AUTO: 31.5 PG (ref 26–34)
MCHC RBC AUTO-ENTMCNC: 33.2 G/DL (ref 31–37)
MCV RBC AUTO: 95 FL (ref 80–100)
MONOCYTES # BLD AUTO: 0.83 X10(3) UL (ref 0.1–1)
MONOCYTES NFR BLD AUTO: 8 %
NEUTROPHILS # BLD AUTO: 8.35 X10 (3) UL (ref 1.5–7.7)
NEUTROPHILS # BLD AUTO: 8.35 X10(3) UL (ref 1.5–7.7)
NEUTROPHILS NFR BLD AUTO: 80.5 %
OSMOLALITY SERPL CALC.SUM OF ELEC: 289 MOSM/KG (ref 275–295)
PLATELET # BLD AUTO: 214 10(3)UL (ref 150–450)
POTASSIUM SERPL-SCNC: 4.8 MMOL/L (ref 3.5–5.1)
RBC # BLD AUTO: 4.63 X10(6)UL (ref 3.8–5.8)
SODIUM SERPL-SCNC: 136 MMOL/L (ref 136–145)
WBC # BLD AUTO: 10.4 X10(3) UL (ref 4–11)

## 2020-08-15 PROCEDURE — 71046 X-RAY EXAM CHEST 2 VIEWS: CPT | Performed by: INTERNAL MEDICINE

## 2020-08-15 RX ORDER — GUAIFENESIN 600 MG
600 TABLET, EXTENDED RELEASE 12 HR ORAL 2 TIMES DAILY
Status: DISCONTINUED | OUTPATIENT
Start: 2020-08-15 | End: 2020-08-17

## 2020-08-15 RX ORDER — ENOXAPARIN SODIUM 100 MG/ML
40 INJECTION SUBCUTANEOUS DAILY
Status: DISCONTINUED | OUTPATIENT
Start: 2020-08-15 | End: 2020-08-17

## 2020-08-15 RX ORDER — GUAIFENESIN 600 MG
600 TABLET, EXTENDED RELEASE 12 HR ORAL 2 TIMES DAILY
Qty: 30 TABLET | Refills: 0 | Status: SHIPPED | OUTPATIENT
Start: 2020-08-15 | End: 2020-10-08 | Stop reason: ALTCHOICE

## 2020-08-15 RX ORDER — POLYETHYLENE GLYCOL 3350 17 G/17G
17 POWDER, FOR SOLUTION ORAL DAILY
Qty: 540 G | Refills: 0 | Status: SHIPPED | OUTPATIENT
Start: 2020-08-16 | End: 2021-06-25 | Stop reason: ALTCHOICE

## 2020-08-15 RX ORDER — CETIRIZINE HYDROCHLORIDE 10 MG/1
10 TABLET ORAL DAILY
Qty: 30 TABLET | Refills: 0 | Status: SHIPPED | OUTPATIENT
Start: 2020-08-15 | End: 2021-06-08 | Stop reason: ALTCHOICE

## 2020-08-15 RX ORDER — CETIRIZINE HYDROCHLORIDE 10 MG/1
10 TABLET ORAL DAILY
Status: DISCONTINUED | OUTPATIENT
Start: 2020-08-15 | End: 2020-08-17

## 2020-08-15 RX ORDER — ACETAMINOPHEN 325 MG/1
650 TABLET ORAL
Qty: 30 TABLET | Refills: 0 | Status: SHIPPED | OUTPATIENT
Start: 2020-08-15 | End: 2021-11-02

## 2020-08-15 RX ORDER — ASPIRIN 81 MG/1
81 TABLET, CHEWABLE ORAL DAILY
Refills: 0 | Status: SHIPPED | COMMUNITY
Start: 2020-08-15 | End: 2021-11-02

## 2020-08-15 NOTE — PLAN OF CARE
2200       IS done , up to 1500. Pt. does not need pain meds at this time. Encourage and instructed pt. To do IS every 1 hr while , pt. verbalized understanding. NSR on the monitor , not in any form of distress.     7760 c/o 7/10 right rib/chest sarah effectiveness of antiarrhythmic and heart rate control medications as ordered  - Initiate emergency measures for life threatening arrhythmias  - Monitor electrolytes and administer replacement therapy as ordered  Outcome: Progressing     Problem: RESPIRATO for mobility and gait  - Educate and engage patient/family in tolerated activity level and precautions  - Recommend use of  RW for transfers and ambulation   Outcome: Progressing     Problem: Impaired Activities of Daily Living  Goal: Achieve highest/safes [phone]

## 2020-08-15 NOTE — PROGRESS NOTES
[facility] Cardiology  Progress Note    Alfredo Bussing Patient Status:  Inpatient    3/23/1935 MRN [id number]   [facility] 8NE-A Attending Ruiz Villar MD   Hosp Day # 3 PCP Rosa Isela Macias MD     Cardiology attending:  Arun Daily  acetaminophen (TYLENOL) tab 650 mg, 650 mg, Oral, TID & HS  tiZANidine HCl (ZANAFLEX) tab 2 mg, 2 mg, Oral, Q8H PRN  gabapentin (NEURONTIN) cap 100 mg, 100 mg, Oral, TID  PEG 3350 (MIRALAX) powder packet 17 g, 17 g, Oral, Daily  lidocaine-menthol 4-

## 2020-08-15 NOTE — CONSULTS
[facility]  Report of Consultation    Glenn Corbin Patient Status:  Inpatient    3/23/1935 MRN [id number]   [facility] 8NE-A Attending South Garner MD   Hosp Day # 3 PCP Allen Duvall MD     Reason for Consultation:  Aliciag Cancer Sister         esophageal/stomach ca   • Cancer Brother         prostate ca   • Heart Disorder Brother         cabg   • Cancer Sister         breast ca   • Cancer Sister         breast ca   • Cancer Sister         breast/colon ca      reports that h snoring, sore throat, hoarseness and voice change. Respiratory: Negative for cough, sputum, hemoptysis, wheezing, dyspnea on exertion, or stridor.   Positive chest pain  Cardiovascular: Negative for chest pain, palpitations, irregular heart beats, syncope, faint occasional crackles CHACHO, right is clear   Chest wall: +right chest wall tenderness   Heart: Regular rate and rhythm, normal S1S2, no murmur. Abdomen: soft, non-tender, non-distended, no masses, no guarding, no  rebound, positive BS.    Extremity: no admission CT. Slight increase on left  · Continue pain control  · Continue pulmonary toilet, IS, bracing w cough. Add acapella. Monitor cough. No indication for abx at the present time.   · Followup w surgery as needed, repeat imaging w PCP or pulm  · S

## 2020-08-15 NOTE — PROGRESS NOTES
Acute Pain Service - Right rib fxs 4-9     Interval Hx  - right erector spinae block x 2 (8/11 and 8/12); unable to get continuous catheter  - confused and hallucinating on Dilaudid PCA (8/12)     Assessment  Assessed pt in bed with wife at bedside.  Pt rep

## 2020-08-15 NOTE — PROGRESS NOTES
[facility]  Progress Note    Ariane Hernandez Patient Status:  Inpatient    3/23/1935 MRN [id number]   [facility] 8NE-A Attending Robina Hernandez MD   Hosp Day # 3 PCP Evi Gonzalez MD     Subjective:   The patient is currently amb apnea)     Multiple closed fractures of ribs of right side     Closed fracture of multiple ribs of right side, initial encounter    Right sided rib fractures following fall from scaffold  Attempt at erector spinae catheter unsuccessful 8/12.  Repeat erector Therapy. I, Dr. Taina Cormier, personally performed the services described in this documentation  by Ms Marilou Lloyd, and they are both accurate and complete. Arianna Fuchs.  MD Sophie FACS  [facility] General Surgery

## 2020-08-15 NOTE — PROGRESS NOTES
Pt. Alert and o x 4 , ambulating at the hallway with 1 staff and walker ,  walked 200 ft. Pt. Verbalized pain much better compared to yesterday . Pt. did IS up to 1500 , denies SOB , c/o minimal pain on the right rib/chest. Cont.  Monitor per tele/lab

## 2020-08-15 NOTE — PROGRESS NOTES
Patient has been cleared for discharge from the following services:  Selma Toledo RN (pain service)  Dr. Rafa Burciaga, (General Surgery)  Dr. Abigail Puri (Pulmonary)  Dr. Roxanne Lilly (Cardiology)    Per Dr. Jocelyne Mar: narcotics and muscle relaxer needs to be presc

## 2020-08-15 NOTE — PLAN OF CARE
Neuro: AOx4  Resp: Diminished. Room air. IS ranges from 1750 to 2000. Encouraged patient to cough and deep breath. Provided pillow for splint support. Patient is reluctant to perform because he is afraid of the pain it will produce.  Pain medication was giv perfusion - ex.  Angina  - Evaluate fluid balance, assess for edema, trend weights  Outcome: Progressing  Goal: Absence of cardiac arrhythmias or at baseline  Description  INTERVENTIONS:  - Continuous cardiac monitoring, monitor vital signs, obtain 12 lead feeding, grooming, and bathing  - Educate and encourage patient/family in tolerated functional activity level and precautions during self-care     Outcome: Progressing     Problem: PAIN - ADULT  Goal: Verbalizes/displays adequate comfort level or patient's

## 2020-08-15 NOTE — CM/SW NOTE
PHYLILS spoke with E regarding hospital bed. Pending insurance authorization, hospital bed will not be delivered until Monday at the earliest. PHYLLIS notified Dr Varinder Yousif of needed documentation and order to be filled out on patients chart.  RN informed to fax order t

## 2020-08-16 LAB — GLUCOSE BLD-MCNC: 103 MG/DL (ref 70–99)

## 2020-08-16 PROCEDURE — 99232 SBSQ HOSP IP/OBS MODERATE 35: CPT | Performed by: SURGERY

## 2020-08-16 RX ORDER — HYDROMORPHONE HYDROCHLORIDE 1 MG/ML
0.2 INJECTION, SOLUTION INTRAMUSCULAR; INTRAVENOUS; SUBCUTANEOUS EVERY 6 HOURS PRN
Status: DISCONTINUED | OUTPATIENT
Start: 2020-08-16 | End: 2020-08-16

## 2020-08-16 RX ORDER — NAPROXEN 250 MG/1
250 TABLET ORAL 2 TIMES DAILY WITH MEALS
Qty: 60 TABLET | Refills: 0 | Status: SHIPPED | OUTPATIENT
Start: 2020-08-16 | End: 2020-11-23

## 2020-08-16 RX ORDER — TIZANIDINE 2 MG/1
2 TABLET ORAL EVERY 8 HOURS PRN
Qty: 30 TABLET | Refills: 0 | Status: SHIPPED | OUTPATIENT
Start: 2020-08-16 | End: 2021-03-17

## 2020-08-16 RX ORDER — OXYCODONE HYDROCHLORIDE 5 MG/1
5 TABLET ORAL EVERY 6 HOURS PRN
Qty: 30 TABLET | Refills: 0 | Status: SHIPPED | OUTPATIENT
Start: 2020-08-16 | End: 2020-10-08 | Stop reason: ALTCHOICE

## 2020-08-16 RX ORDER — GABAPENTIN 100 MG/1
100 CAPSULE ORAL 3 TIMES DAILY
Qty: 90 CAPSULE | Refills: 0 | Status: SHIPPED | OUTPATIENT
Start: 2020-08-16 | End: 2020-10-08 | Stop reason: ALTCHOICE

## 2020-08-16 RX ORDER — HYDROMORPHONE HYDROCHLORIDE 1 MG/ML
0.1 INJECTION, SOLUTION INTRAMUSCULAR; INTRAVENOUS; SUBCUTANEOUS EVERY 6 HOURS PRN
Status: DISCONTINUED | OUTPATIENT
Start: 2020-08-16 | End: 2020-08-16

## 2020-08-16 NOTE — PROGRESS NOTES
VO/RB Dr. Leander Samuel MD    Dx: Chronic Pain; Closed fracture of multiple ribs of right side that is causing chronic pain. I had a face to face evaluation with Natalia Wu to determine medical necessity for a semi electric hospital bed.  Patient

## 2020-08-16 NOTE — PROGRESS NOTES
[facility]  Progress Note    Alfredo Bussing Patient Status:  Inpatient    3/23/1935 MRN [id number]   [facility] 8NE-A Attending Ruiz Villar MD   Hosp Day # 4 PCP Rosa Isela Macias MD     Subjective:  Is sitting up in his chair. rib fractures. No pneumothorax.        Dictated by (CST): Kyung Reyes MD on 8/15/2020 at 12:45 PM       Finalized by (CST): Kyung Reyes MD on 8/15/2020 at 12:47 PM       Assessment  Patient Active Problem List:     Mixed hyperlipidemia     Co yesterday. I had a lengthy discussion with the patient and his wife regarding the care plan to this point as well as the anticipated care plan going forward.   Specifically, we discussed pain management strategies with multiple modalities including oral management strategies, home physical therapy were discussed and resources requested. · Anticipate suitability for discharge home tomorrow assuming all services and medical equipment will be available at home upon discharge.   · The patient was provided amp

## 2020-08-16 NOTE — PLAN OF CARE
Pt. Alert and o x 4 , not in any form of distress. Pt. Been walking at the hallway and using IS often . Described pain on his right rib cage improved , only painful when he cough or with movement. Cont. Monitor per tele/labs/v/s.  All things nee threatening arrhythmias  - Monitor electrolytes and administer replacement therapy as ordered  Outcome: Progressing     Problem: RESPIRATORY - ADULT  Goal: Achieves optimal ventilation and oxygenation  Description  INTERVENTIONS:  - Assess for changes in r use of  RW for transfers and ambulation   Outcome: Progressing     Problem: Impaired Activities of Daily Living  Goal: Achieve highest/safest level of independence in self care  Description  Interventions:  - Assess ability and encourage patient to partici

## 2020-08-16 NOTE — CM/SW NOTE
PHYLLIS spoke with Jaimee DEY who states that MD has not rounded yet. Jaimee will have MD sign hospital bed form and fax back to [facility].     Je Todd MSN RN, CTL

## 2020-08-16 NOTE — PLAN OF CARE
Neuro: AOx4  Resp: Diminished. IS effort poor this morning r/t pain. Denies cough. Cardiac: NSR on tele. Pedal pulses palpable. No edema. GI: WDL  : WDL    Oxycodone given at 6:20 this morning, followed by Zaniflex around 9:30 and oxycodone at 10:20.  P ordered  Outcome: Progressing     Problem: RESPIRATORY - ADULT  Goal: Achieves optimal ventilation and oxygenation  Description  INTERVENTIONS:  - Assess for changes in respiratory status  - Assess for changes in mentation and behavior  - Position to facil Implement non-pharmacological measures as appropriate and evaluate response  - Consider cultural and social influences on pain and pain management  - Manage/alleviate anxiety  - Utilize distraction and/or relaxation techniques  - Monitor for opioid side ef

## 2020-08-16 NOTE — PROGRESS NOTES
[facility] Cardiology  Progress Note    Jeannie Bryan Patient Status:  Inpatient    3/23/1935 MRN [id number]   [facility] 8NE-A Attending Solitario Caal MD   Hosp Day # 4 PCP Wan Narayanan MD     Impression:  Karlee Dudley Oral, Daily  Enoxaparin Sodium (LOVENOX) 40 MG/0.4ML injection 40 mg, 40 mg, Subcutaneous, Daily  lidocaine-menthol 4-1 % 1 patch, 1 patch, Transdermal, Daily  acetaminophen (TYLENOL) tab 650 mg, 650 mg, Oral, TID & HS  tiZANidine HCl (ZANAFLEX) tab 2 mg,

## 2020-08-16 NOTE — PROGRESS NOTES
[facility]  Progress Note    Mariah Saucedo Patient Status:  Inpatient    3/23/1935 MRN [id number]   [facility] 8NE-A Attending Veronica Weinstein MD   Hosp Day # 4 PCP Mariel Alfaro MD     Subjective:  Mariah Saucedo is a(n) 81 y 08/13/20  2012 08/15/20  0535   * 123*  --  102*   BUN 27* 21*  --  32*   CREATSERUM 1.22 0.89  --  1.11   GFRAA 63 91  --  70   GFRNAA 54* 79  --  61   CA 9.9 8.8  --  9.2   ALB 3.7 2.9*  --  2.5*    138  --  136   K 4.2 3.6 4.3 4.8

## 2020-08-17 VITALS
WEIGHT: 183.31 LBS | SYSTOLIC BLOOD PRESSURE: 94 MMHG | OXYGEN SATURATION: 91 % | RESPIRATION RATE: 18 BRPM | TEMPERATURE: 99 F | DIASTOLIC BLOOD PRESSURE: 47 MMHG | BODY MASS INDEX: 27.46 KG/M2 | HEIGHT: 68.5 IN | HEART RATE: 97 BPM

## 2020-08-17 LAB
ALBUMIN SERPL-MCNC: 2.6 G/DL (ref 3.4–5)
ALBUMIN/GLOB SERPL: 0.6 {RATIO} (ref 1–2)
ALP LIVER SERPL-CCNC: 155 U/L (ref 45–117)
ALT SERPL-CCNC: 86 U/L (ref 16–61)
ANION GAP SERPL CALC-SCNC: 4 MMOL/L (ref 0–18)
AST SERPL-CCNC: 54 U/L (ref 15–37)
BASOPHILS # BLD AUTO: 0.04 X10(3) UL (ref 0–0.2)
BASOPHILS NFR BLD AUTO: 0.4 %
BILIRUB SERPL-MCNC: 0.6 MG/DL (ref 0.1–2)
BUN BLD-MCNC: 30 MG/DL (ref 7–18)
BUN/CREAT SERPL: 27.5 (ref 10–20)
CALCIUM BLD-MCNC: 9.6 MG/DL (ref 8.5–10.1)
CHLORIDE SERPL-SCNC: 105 MMOL/L (ref 98–112)
CO2 SERPL-SCNC: 25 MMOL/L (ref 21–32)
CREAT BLD-MCNC: 1.09 MG/DL (ref 0.7–1.3)
DEPRECATED RDW RBC AUTO: 48.2 FL (ref 35.1–46.3)
EOSINOPHIL # BLD AUTO: 0.23 X10(3) UL (ref 0–0.7)
EOSINOPHIL NFR BLD AUTO: 2.2 %
ERYTHROCYTE [DISTWIDTH] IN BLOOD BY AUTOMATED COUNT: 13.4 % (ref 11–15)
GLOBULIN PLAS-MCNC: 4.3 G/DL (ref 2.8–4.4)
GLUCOSE BLD-MCNC: 116 MG/DL (ref 70–99)
HCT VFR BLD AUTO: 43.4 % (ref 39–53)
HGB BLD-MCNC: 14.3 G/DL (ref 13–17.5)
IMM GRANULOCYTES # BLD AUTO: 0.05 X10(3) UL (ref 0–1)
IMM GRANULOCYTES NFR BLD: 0.5 %
LYMPHOCYTES # BLD AUTO: 0.77 X10(3) UL (ref 1–4)
LYMPHOCYTES NFR BLD AUTO: 7.4 %
M PROTEIN MFR SERPL ELPH: 6.9 G/DL (ref 6.4–8.2)
MCH RBC QN AUTO: 32.1 PG (ref 26–34)
MCHC RBC AUTO-ENTMCNC: 32.9 G/DL (ref 31–37)
MCV RBC AUTO: 97.5 FL (ref 80–100)
MONOCYTES # BLD AUTO: 0.97 X10(3) UL (ref 0.1–1)
MONOCYTES NFR BLD AUTO: 9.3 %
NEUTROPHILS # BLD AUTO: 8.41 X10 (3) UL (ref 1.5–7.7)
NEUTROPHILS # BLD AUTO: 8.41 X10(3) UL (ref 1.5–7.7)
NEUTROPHILS NFR BLD AUTO: 80.2 %
OSMOLALITY SERPL CALC.SUM OF ELEC: 285 MOSM/KG (ref 275–295)
PLATELET # BLD AUTO: 271 10(3)UL (ref 150–450)
POTASSIUM SERPL-SCNC: 4.6 MMOL/L (ref 3.5–5.1)
RBC # BLD AUTO: 4.45 X10(6)UL (ref 3.8–5.8)
SODIUM SERPL-SCNC: 134 MMOL/L (ref 136–145)
WBC # BLD AUTO: 10.5 X10(3) UL (ref 4–11)

## 2020-08-17 NOTE — HOME CARE LIAISON
Received referral from Sarah Nguyen. Met with patient and son at the bedside to discuss home health services and offer choice. Patient is agreeable to [facility] services at discharge. Brochure and contact information provided. Any questions addressed. Will follow.

## 2020-08-17 NOTE — PROGRESS NOTES
[facility]    Progress Note    Emilee Pritchett Patient Status:  Inpatient    3/23/1935 MRN [id number]   [facility] 8NE-A Attending Colleen Montanez MD   Hosp Day # 5 PCP Erinn [place] MD       SUBJECTIVE:  Continues to have pain Oral, Daily  lidocaine-menthol 4-1 % 1 patch, 1 patch, Transdermal, Daily  oxyCODONE HCl (OXY-IR) cap/tab 5 mg, 5 mg, Oral, Q4H PRN    Or  oxyCODONE HCl (OXY-IR) cap/tab 10 mg, 10 mg, Oral, Q4H PRN  diphenhydrAMINE (BENADRYL) 12.5 MG/5ML oral liquid 12.5 m

## 2020-08-17 NOTE — PROGRESS NOTES
Thoracic Surgery Progress Note     Leighton Berkowitz is a 81year old male. MRN [id number]. Admitted 8/11/2020    [address] [facility] [address] [address] EVENTS:     Pain has significantly improved. He would like to be discharged as soon as possible.        Objective:     VITALS: 08/17/2020    AST 54 08/17/2020    ALT 86 08/17/2020         Assessment/Plan:      Mr. Iesha Larson is a 81year old male with right posterior comminuted rib fractures 5-10 on CT scan and chest wall pain.      His pain has much improved and he is eager for dis

## 2020-08-17 NOTE — CM/SW NOTE
Patient accepted by residential home healthcare at discharge.   Request order form with requested npi, order date and length of need faxed to [facility] [phone] as requested; await response

## 2020-08-17 NOTE — PROGRESS NOTES
Pt discharged to home. Being driven home by son. Reviewed all medications with patient and son. New scripts provided by [address] [place] [address]. Instructed to follow up with cardiology and pulmonology, phone numbers provided for appts to be made.  Phone numbers pro

## 2020-08-17 NOTE — PROGRESS NOTES
[facility]  Progress Note    Glenn Corbin Patient Status:  Inpatient    3/23/1935 MRN [id number]   [facility] 8NE-A Attending [place] Garner MD   Hosp Day # 5 PCP Allen Duvall MD     Subjective:  Is sitting up in his chair. prostate cancer     History of TIA (transient ischemic attack)     JAMESON (obstructive sleep apnea)     Multiple closed fractures of ribs of right side     Closed fracture of multiple ribs of right side, initial encounter    Right sided rib fractures followin

## 2020-08-17 NOTE — PROGRESS NOTES
[facility]    Progress Note    Daysi Leyva Patient Status:  Inpatient    3/23/1935 MRN [id number]   [facility] 8NE-A Attending Terrell Cameron MD   Hosp Day # 5 PCP Ava Carrington MD       SUBJECTIVE:  Continues to have pain cancer     History of TIA (transient ischemic attack)     JAMESON (obstructive sleep apnea)     Multiple closed fractures of ribs of right side     Closed fracture of multiple ribs of right side, initial encounter    Low bp  Will adjust meds    Fall with multi

## 2020-08-17 NOTE — PROGRESS NOTES
[facility] Cardiology Progress Note        José Luis Oseguera Patient Status:  Inpatient    3/23/1935 MRN [id number]   [facility] [place] 8NE-A Attending Eliverto Blizzard, MD   Hosp Day # 5 PCP Lilian Ritchie MD     Subjective No clubbing or cyanosis. Neurologic: Alert and oriented, normal affect. Skin: Warm and dry.            LABS:      HEM:  Recent Labs   Lab 08/11/20  1634 08/13/20  1051 08/15/20  0535   WBC 13.0* 10.5 10.4   HGB 15.2 14.8 14.6   HCT 45.9 43.8 44.0   PLT

## 2020-08-17 NOTE — PROGRESS NOTES
[facility]    Progress Note    Ariane Hernandez Patient Status:  Inpatient    3/23/1935 MRN [id number]   [facility] 8NE-A Attending Robina Hernandez MD   Hosp Day # 5 PCP Evi Gonzalez MD       SUBJECTIVE:  Continues to have pain Intravenous, Q8H PRN          Assessment  Patient Active Problem List:     Mixed hyperlipidemia     Coronary atherosclerosis     History of prostate cancer     History of TIA (transient ischemic attack)     JAMESON (obstructive sleep apnea)     Multiple closed

## 2020-08-17 NOTE — PLAN OF CARE
Assumed care for pt. At [address]. Pt. C/o right rib cage pain that is manages with PO meds. Meds given whole with applesauce. A&Ox4. On RA, using IS and fluttervalve at bedside. NSR on tele. Voiding per urinal. Bed/chair alarm for safety.  Plan for hospital bed Achieves optimal ventilation and oxygenation  Description  INTERVENTIONS:  - Assess for changes in respiratory status  - Assess for changes in mentation and behavior  - Position to facilitate oxygenation and minimize respiratory effort  - Oxygen supplement independence in self care  Description  Interventions:  - Assess ability and encourage patient to participate in ADLs to maximize function  - Promote sitting position while performing ADLs such as feeding, grooming, and bathing  - Educate and encourage pat

## 2020-08-18 NOTE — PAYOR COMM NOTE
--------------  DISCHARGE REVIEW    Payor: [facility] Keshav Mcgrath [place] #:  [id number]  Authorization Number: [id number]    Admit date: 8/12/20  Admit time:  9642  Discharge Date: 8/17/2020  1:51 PM     Admitting Physician: Ruiz Villar MD

## 2020-08-26 PROBLEM — I95.2 HYPOTENSION DUE TO DRUGS: Status: ACTIVE | Noted: 2020-08-26

## 2020-09-01 NOTE — DISCHARGE SUMMARY
[address] Discharge Summary    Daysi Leyva Patient Status:  Inpatient    3/23/1935 MRN [id number]   [facility] 8NE-A Attending No att. providers found   Hosp Day # 5 PCP Ava Carrington MD     Date of Admission: 8          Past Surgical History:   Procedure Laterality Date   • EXTREMITY LOWER IRRIGATION & DEBRIDEMENT Right 1/2/2015     Performed by Iram Grey MD at [facility] MAIN OR   • OTHER SURGICAL HISTORY   5-1-08     brachytherapy of prostate   • OTHER SURGICAL (DILAUDID) 1 MG/ML injection 0.2 mg, 0.2 mg, Intravenous, Q2H PRN **OR** HYDROmorphone HCl (DILAUDID) 1 MG/ML injection 0.4 mg, 0.4 mg, Intravenous, Q2H PRN  •  Pantoprazole Sodium (PROTONIX) EC tab 20 mg, 20 mg, Oral, BID AC  •  Metoclopramide HCl (REGLAN 98.9 °F (37.2 °C) Oral 82 20 91 %   08/14/20 1700 137/67 98.3 °F (36.8 °C) Oral 84 18 90 %   08/14/20 1300 101/50 — — 69 — 96 %   08/14/20 1223 — 98.1 °F (36.7 °C) Oral 79 20 96 %      RA     Physical Exam:               General: alert, cooperative, orient input(s): ABGPHT, FVTKGO7L, JFRIO7Q, ABGHCO3, ABGBE, TEMP, ANGELITA, SITE, DEV, THGB in the last 168 hours.     Invalid input(s): VIQ61FBN, CHOB     Invalid input(s): CKTOTAL, TROPONINI, TROPONINT, CKMBINDEX        Cultures:   Sputum: rapid covid negative    R-0    acetaminophen 325 MG Oral Tab  Take 2 tablets (650 mg total) by mouth TID & HS., Normal, Disp-30 tablet, R-0    guaiFENesin  MG Oral Tablet 12 Hr  Take 1 tablet (600 mg total) by mouth 2 (two) times daily. , Normal, Disp-30 tablet, R-0    PEG 3

## 2020-10-14 PROBLEM — M75.122 NONTRAUMATIC COMPLETE TEAR OF LEFT ROTATOR CUFF: Status: ACTIVE | Noted: 2020-10-14

## 2021-04-30 PROBLEM — M81.0 AGE-RELATED OSTEOPOROSIS WITHOUT CURRENT PATHOLOGICAL FRACTURE: Status: ACTIVE | Noted: 2021-04-30

## 2021-06-16 PROBLEM — R26.81 UNSTEADINESS: Status: ACTIVE | Noted: 2021-06-16

## 2021-06-16 PROBLEM — Z91.81 RISK FOR FALLS: Status: ACTIVE | Noted: 2021-06-16

## 2021-06-16 PROBLEM — R90.82 WHITE MATTER ABNORMALITY ON MRI OF BRAIN: Status: ACTIVE | Noted: 2021-06-16

## 2021-06-25 PROBLEM — N18.31 STAGE 3A CHRONIC KIDNEY DISEASE (HCC): Status: ACTIVE | Noted: 2021-06-25

## 2021-06-25 PROBLEM — S22.41XA MULTIPLE CLOSED FRACTURES OF RIBS OF RIGHT SIDE: Status: RESOLVED | Noted: 2020-08-11 | Resolved: 2021-06-25

## 2021-06-25 PROBLEM — I95.2 HYPOTENSION DUE TO DRUGS: Status: RESOLVED | Noted: 2020-08-26 | Resolved: 2021-06-25

## 2021-06-25 PROBLEM — M81.0 OSTEOPOROSIS, UNSPECIFIED OSTEOPOROSIS TYPE, UNSPECIFIED PATHOLOGICAL FRACTURE PRESENCE: Status: ACTIVE | Noted: 2021-04-30

## 2021-07-01 PROBLEM — M17.12 PRIMARY OSTEOARTHRITIS OF LEFT KNEE: Status: ACTIVE | Noted: 2021-07-01

## 2021-10-19 ENCOUNTER — APPOINTMENT (OUTPATIENT)
Dept: ULTRASOUND IMAGING | Facility: HOSPITAL | Age: 86
End: 2021-10-19
Attending: EMERGENCY MEDICINE
Payer: MEDICARE

## 2021-10-19 ENCOUNTER — HOSPITAL ENCOUNTER (EMERGENCY)
Facility: HOSPITAL | Age: 86
Discharge: HOME | End: 2021-10-19
Attending: EMERGENCY MEDICINE
Payer: MEDICARE

## 2021-10-19 ENCOUNTER — APPOINTMENT (OUTPATIENT)
Dept: GENERAL RADIOLOGY | Facility: HOSPITAL | Age: 86
End: 2021-10-19
Attending: EMERGENCY MEDICINE
Payer: MEDICARE

## 2021-10-19 VITALS
HEART RATE: 65 BPM | DIASTOLIC BLOOD PRESSURE: 75 MMHG | HEIGHT: 68 IN | SYSTOLIC BLOOD PRESSURE: 143 MMHG | BODY MASS INDEX: 27.28 KG/M2 | TEMPERATURE: 98 F | RESPIRATION RATE: 16 BRPM | OXYGEN SATURATION: 97 % | WEIGHT: 180 LBS

## 2021-10-19 DIAGNOSIS — S76.012A HIP STRAIN, LEFT, INITIAL ENCOUNTER: Primary | ICD-10-CM

## 2021-10-19 PROCEDURE — 99284 EMERGENCY DEPT VISIT MOD MDM: CPT

## 2021-10-19 PROCEDURE — 93971 EXTREMITY STUDY: CPT | Performed by: EMERGENCY MEDICINE

## 2021-10-19 PROCEDURE — 73502 X-RAY EXAM HIP UNI 2-3 VIEWS: CPT | Performed by: EMERGENCY MEDICINE

## 2021-10-19 NOTE — ED PROVIDER NOTES
Patient Seen in: [facility] Emergency Department      History   Patient presents with:  Leg Pain    Stated Complaint: pain to left hip     Subjective:   HPI    This is a very pleasant 46-year-old male who presents complaining of left hip and thigh p brachytherapy of prostate   • OTHER SURGICAL HISTORY  7/30/12    cysto, lt urs, litho, stone extraction of fragments, rpg, lt stent placement- Dr. Joe Abraham   • [address]  8/3/12    cysto stent removal- Dr. Joe Abraham   • OTHER SURGICAL HISTORY  1/2/15    Rt Reviewed - No data to display              MDM       Left hip x-ray showed symmetric appearance of both hips with mild lateral joint space narrowing.     Given recent travel to [place] and back driving a left lower extremity ultrasound was obtained and cur

## 2021-10-26 PROBLEM — G31.84 MCI (MILD COGNITIVE IMPAIRMENT): Status: ACTIVE | Noted: 2021-10-26

## 2021-10-26 PROBLEM — M75.122 NONTRAUMATIC COMPLETE TEAR OF LEFT ROTATOR CUFF: Status: RESOLVED | Noted: 2020-10-14 | Resolved: 2021-10-26

## 2021-10-26 PROBLEM — S22.41XA CLOSED FRACTURE OF MULTIPLE RIBS OF RIGHT SIDE, INITIAL ENCOUNTER: Status: RESOLVED | Noted: 2020-08-11 | Resolved: 2021-10-26

## 2021-10-28 ENCOUNTER — HOSPITAL ENCOUNTER (OUTPATIENT)
Facility: HOSPITAL | Age: 86
Setting detail: OBSERVATION
Discharge: HOME | End: 2021-11-02
Attending: EMERGENCY MEDICINE | Admitting: HOSPITALIST
Payer: MEDICARE

## 2021-10-28 DIAGNOSIS — M54.16 LUMBAR RADICULITIS: ICD-10-CM

## 2021-10-28 DIAGNOSIS — M54.59 INTRACTABLE LOW BACK PAIN: Primary | ICD-10-CM

## 2021-10-28 PROCEDURE — 82565 ASSAY OF CREATININE: CPT

## 2021-10-28 PROCEDURE — 99285 EMERGENCY DEPT VISIT HI MDM: CPT

## 2021-10-28 PROCEDURE — 96376 TX/PRO/DX INJ SAME DRUG ADON: CPT

## 2021-10-28 PROCEDURE — 93010 ELECTROCARDIOGRAM REPORT: CPT | Performed by: INTERNAL MEDICINE

## 2021-10-28 PROCEDURE — 96374 THER/PROPH/DIAG INJ IV PUSH: CPT

## 2021-10-28 PROCEDURE — 93005 ELECTROCARDIOGRAM TRACING: CPT

## 2021-10-28 PROCEDURE — 96375 TX/PRO/DX INJ NEW DRUG ADDON: CPT

## 2021-10-28 RX ORDER — DOCUSATE SODIUM 100 MG/1
100 CAPSULE, LIQUID FILLED ORAL 2 TIMES DAILY
Status: DISCONTINUED | OUTPATIENT
Start: 2021-10-28 | End: 2021-11-02

## 2021-10-28 RX ORDER — METOCLOPRAMIDE HYDROCHLORIDE 5 MG/ML
10 INJECTION INTRAMUSCULAR; INTRAVENOUS EVERY 8 HOURS PRN
Status: DISCONTINUED | OUTPATIENT
Start: 2021-10-28 | End: 2021-11-02

## 2021-10-28 RX ORDER — MORPHINE SULFATE 4 MG/ML
4 INJECTION, SOLUTION INTRAMUSCULAR; INTRAVENOUS EVERY 2 HOUR PRN
Status: DISCONTINUED | OUTPATIENT
Start: 2021-10-28 | End: 2021-11-02

## 2021-10-28 RX ORDER — BISACODYL 10 MG
10 SUPPOSITORY, RECTAL RECTAL
Status: DISCONTINUED | OUTPATIENT
Start: 2021-10-28 | End: 2021-11-02

## 2021-10-28 RX ORDER — HYDROCODONE BITARTRATE AND ACETAMINOPHEN 5; 325 MG/1; MG/1
1 TABLET ORAL EVERY 4 HOURS PRN
Status: DISCONTINUED | OUTPATIENT
Start: 2021-10-28 | End: 2021-10-29

## 2021-10-28 RX ORDER — ONDANSETRON 2 MG/ML
4 INJECTION INTRAMUSCULAR; INTRAVENOUS ONCE
Status: COMPLETED | OUTPATIENT
Start: 2021-10-28 | End: 2021-10-28

## 2021-10-28 RX ORDER — ACETAMINOPHEN 325 MG/1
650 TABLET ORAL EVERY 4 HOURS PRN
Status: DISCONTINUED | OUTPATIENT
Start: 2021-10-28 | End: 2021-11-02

## 2021-10-28 RX ORDER — ENOXAPARIN SODIUM 100 MG/ML
40 INJECTION SUBCUTANEOUS DAILY
Status: DISPENSED | OUTPATIENT
Start: 2021-10-28 | End: 2021-10-30

## 2021-10-28 RX ORDER — MORPHINE SULFATE 4 MG/ML
2 INJECTION, SOLUTION INTRAMUSCULAR; INTRAVENOUS EVERY 2 HOUR PRN
Status: DISCONTINUED | OUTPATIENT
Start: 2021-10-28 | End: 2021-11-02

## 2021-10-28 RX ORDER — HYDROCODONE BITARTRATE AND ACETAMINOPHEN 5; 325 MG/1; MG/1
2 TABLET ORAL EVERY 4 HOURS PRN
Status: DISCONTINUED | OUTPATIENT
Start: 2021-10-28 | End: 2021-10-29

## 2021-10-28 RX ORDER — MORPHINE SULFATE 4 MG/ML
4 INJECTION, SOLUTION INTRAMUSCULAR; INTRAVENOUS ONCE
Status: COMPLETED | OUTPATIENT
Start: 2021-10-28 | End: 2021-10-28

## 2021-10-28 RX ORDER — ONDANSETRON 2 MG/ML
4 INJECTION INTRAMUSCULAR; INTRAVENOUS EVERY 6 HOURS PRN
Status: DISCONTINUED | OUTPATIENT
Start: 2021-10-28 | End: 2021-11-02

## 2021-10-28 RX ORDER — MORPHINE SULFATE 4 MG/ML
1 INJECTION, SOLUTION INTRAMUSCULAR; INTRAVENOUS EVERY 2 HOUR PRN
Status: DISCONTINUED | OUTPATIENT
Start: 2021-10-28 | End: 2021-11-02

## 2021-10-28 RX ORDER — ROSUVASTATIN CALCIUM 20 MG/1
40 TABLET, COATED ORAL NIGHTLY
Status: DISCONTINUED | OUTPATIENT
Start: 2021-10-28 | End: 2021-11-02

## 2021-10-28 RX ORDER — PANTOPRAZOLE SODIUM 40 MG/1
40 TABLET, DELAYED RELEASE ORAL 2 TIMES DAILY
Status: DISCONTINUED | OUTPATIENT
Start: 2021-10-28 | End: 2021-11-02

## 2021-10-28 RX ORDER — POLYETHYLENE GLYCOL 3350 17 G/17G
17 POWDER, FOR SOLUTION ORAL DAILY PRN
Status: DISCONTINUED | OUTPATIENT
Start: 2021-10-28 | End: 2021-11-02

## 2021-10-28 RX ORDER — SODIUM PHOSPHATE, DIBASIC AND SODIUM PHOSPHATE, MONOBASIC 7; 19 G/133ML; G/133ML
1 ENEMA RECTAL ONCE AS NEEDED
Status: DISCONTINUED | OUTPATIENT
Start: 2021-10-28 | End: 2021-11-02

## 2021-10-28 NOTE — H&P
[facility] Hospitalist H&P       CC: L lower back, LLE pain    PCP: Carlyon Claude, MD    History of Present Illness: Pt is an 82 yo with mmp including but not limited to CAD, HTN/HL, JAMESON, vertigo, who is admitted d/t L lower back pain radiating to LLE. 8/3/12    cysto stent removal- Dr. Diaz Confer   • OTHER SURGICAL HISTORY  1/2/15    Rt hip s/p evacuation of hematoma        ALL:    Dilaudid [[employer]*    HALLUCINATION     Home Medications:  traMADol 50 MG Oral Tab, Take 50 mg by mouth 2 (two) times daily as ne of breath, syncope.        OBJECTIVE:  /51   Pulse 79   Temp 97.8 °F (36.6 °C) (Oral)   Resp 18   Wt 185 lb 9.6 oz (84.2 kg)   SpO2 94%   BMI 28.22 kg/m²   General:  Alert, NAD, appears stated age   Head:  Normocephalic, without obvious abnormality, a patient while in house    Patient and/or patient's family given opportunity to ask questions and note understanding and agreeing with therapeutic plan as outlined    Thank You,  Yoli Lara MD  [facility] Hospitalist  Internal Medicine

## 2021-10-28 NOTE — ED INITIAL ASSESSMENT (HPI)
Sent by spine center for admission to hospital.  Pt has had pain to lower back radiating down rt leg for days,   Seen yesterday for same ,  States nobody did anything for him.

## 2021-10-28 NOTE — PLAN OF CARE
NURSING ADMISSION NOTE      Patient admitted via stretcher. Oriented to room. Safety precautions initiated. Bed in low position. Call light in reach. Pain 3/10. Will monitor.

## 2021-10-29 PROCEDURE — 96372 THER/PROPH/DIAG INJ SC/IM: CPT

## 2021-10-29 PROCEDURE — 93005 ELECTROCARDIOGRAM TRACING: CPT

## 2021-10-29 PROCEDURE — 96376 TX/PRO/DX INJ SAME DRUG ADON: CPT

## 2021-10-29 PROCEDURE — 83735 ASSAY OF MAGNESIUM: CPT | Performed by: HOSPITALIST

## 2021-10-29 PROCEDURE — 85027 COMPLETE CBC AUTOMATED: CPT | Performed by: HOSPITALIST

## 2021-10-29 PROCEDURE — 80048 BASIC METABOLIC PNL TOTAL CA: CPT | Performed by: HOSPITALIST

## 2021-10-29 PROCEDURE — 93010 ELECTROCARDIOGRAM REPORT: CPT | Performed by: INTERNAL MEDICINE

## 2021-10-29 RX ORDER — TRAMADOL HYDROCHLORIDE 50 MG/1
50 TABLET ORAL EVERY 6 HOURS PRN
Status: DISCONTINUED | OUTPATIENT
Start: 2021-10-29 | End: 2021-11-02

## 2021-10-29 RX ORDER — HYDROCODONE BITARTRATE AND ACETAMINOPHEN 5; 325 MG/1; MG/1
2 TABLET ORAL EVERY 4 HOURS PRN
Status: DISCONTINUED | OUTPATIENT
Start: 2021-10-29 | End: 2021-11-02

## 2021-10-29 RX ORDER — TRAMADOL HYDROCHLORIDE 50 MG/1
100 TABLET ORAL EVERY 6 HOURS PRN
Status: DISCONTINUED | OUTPATIENT
Start: 2021-10-29 | End: 2021-11-02

## 2021-10-29 RX ORDER — HYDROCODONE BITARTRATE AND ACETAMINOPHEN 5; 325 MG/1; MG/1
1 TABLET ORAL EVERY 4 HOURS PRN
Status: DISCONTINUED | OUTPATIENT
Start: 2021-10-29 | End: 2021-11-02

## 2021-10-29 NOTE — PLAN OF CARE
Received pt at shift change. Pt eating well. Using PO pain meds. Walking in the halls, and sitting up in chair.  All questions and concerns addressed, support given, will monitor

## 2021-10-29 NOTE — PROGRESS NOTES
[facility] Hospitalist Progress Note     PCP: Yann Posadas MD    CC:  Follow up    SUBJECTIVE:  Laying in bed, pain manageable with IV morphine. No cp/sob/n/v/f/c. No BM. No new symptoms or other concerns.     Says prior to pain, has been able to climb u ondansetron, metoclopramide       Assessment/Plan:     Principal Problem:    Intractable low back pain       80 yo with mmp including but not limited to CAD, HTN/HL, JAMESON, vertigo, who is admitted d/t L lower back pain radiating to LLE.     **L lower back p

## 2021-10-29 NOTE — ED PROVIDER NOTES
Patient Seen in: [address] 0sw-a      History   Patient presents with:  Back Pain  Leg Pain    Stated Complaint: sent by ortho for admission due to severe back, leg and hip pain    Subjective:   HPI    78-year-old male presents emergency department HISTORY  5-1-08    brachytherapy of prostate   • OTHER SURGICAL HISTORY  7/30/12    cysto, lt urs, litho, stone extraction of fragments, rpg, lt stent placement- Dr. Clorinda Mortimer   • [address]  8/3/12    cysto stent removal- Dr. Emma Medrano HI Patient is alert and in moderate pain distress  HEENT: Pupils equal react to light extraocular muscles intact no scleral icterus, mucous membranes are moist, there is no erythema or exudate in the posterior pharynx  Neck: Supple no JVD no lymphadenopathy n MDM      Patient was evaluated in the emergency department and at this point patient will need admission for further management of medical condition.   Patient was stable in the emergency department and will be transferred to floor for further definitiv

## 2021-10-30 PROCEDURE — 85025 COMPLETE CBC W/AUTO DIFF WBC: CPT | Performed by: INTERNAL MEDICINE

## 2021-10-30 PROCEDURE — 97165 OT EVAL LOW COMPLEX 30 MIN: CPT

## 2021-10-30 PROCEDURE — 97161 PT EVAL LOW COMPLEX 20 MIN: CPT

## 2021-10-30 PROCEDURE — 96372 THER/PROPH/DIAG INJ SC/IM: CPT

## 2021-10-30 PROCEDURE — 36415 COLL VENOUS BLD VENIPUNCTURE: CPT | Performed by: INTERNAL MEDICINE

## 2021-10-30 PROCEDURE — 96376 TX/PRO/DX INJ SAME DRUG ADON: CPT

## 2021-10-30 PROCEDURE — 97535 SELF CARE MNGMENT TRAINING: CPT

## 2021-10-30 PROCEDURE — 97116 GAIT TRAINING THERAPY: CPT

## 2021-10-30 PROCEDURE — 80048 BASIC METABOLIC PNL TOTAL CA: CPT | Performed by: HOSPITALIST

## 2021-10-30 RX ORDER — ENOXAPARIN SODIUM 100 MG/ML
40 INJECTION SUBCUTANEOUS ONCE
Status: COMPLETED | OUTPATIENT
Start: 2021-10-30 | End: 2021-10-30

## 2021-10-30 NOTE — PROGRESS NOTES
Assumed patient care at 1900, patient is alert and oriented, patient reports back pain 2/10. Patient reports relief of pain with norco. Patient waiting to transfer to room 370, report called to Francee Kocher, RN.

## 2021-10-30 NOTE — PLAN OF CARE
NURSING NOTES 7a-4p:  0800: Pt received AOx4. Room air. SR. RFA saline lock. 1100: Pt sitting up in the chair. PT and OT saw pt. Pt's wife at bedside. 1230: Norco for back pain. Lovenox x1 given as ordered.   1600: Report given to Jesus DEY    Problem: Jeanniea

## 2021-10-30 NOTE — PROGRESS NOTES
[facility] Hospitalist Progress Note     PCP: Laura Camarena MD    CC:  Follow up    SUBJECTIVE:  Walking to restroom with PT.  Pain manageable currently    OBJECTIVE:  Temp:  [97.8 °F (36.6 °C)-98.9 °F (37.2 °C)] 98.3 °F (36.8 °C)  Pulse:  [79-89] 89  Res including but not limited to CAD, HTN/HL, JAMESON, vertigo, who is admitted d/t L lower back pain radiating to LLE.     **L lower back pain radiating to LLE  -final read of MRI lumbar spine pending  -spine consult, appreciate.  dw Dr. Andrea Lomeli, plan is for L4-L5 m

## 2021-10-30 NOTE — PHYSICAL THERAPY NOTE
PHYSICAL THERAPY EVALUATION - INPATIENT     Room Number: 370/370-A  Evaluation Date: 10/30/2021  Type of Evaluation: Initial  Physician Order: PT Eval and Treat    Presenting Problem: intractable back pain  Reason for Therapy: Mobility Dysfunction an Laterality Date   • DRUG-ELUTING STENTS, SINGLE  1989   • OTHER SURGICAL HISTORY  5-1-08    brachytherapy of prostate   • OTHER SURGICAL HISTORY  7/30/12    cysto, lt urs, litho, stone extraction of fragments, rpg, lt stent placement- Dr. Whitmore Dk Rest: 91  SPO2% Ambulation on Room Air: 90    AM-PAC '6-Clicks' INPATIENT SHORT FORM - BASIC MOBILITY  How much difficulty does the patient currently have. ..  -   Turning over in bed (including adjusting bedclothes, sheets and blankets)?: A Little   -   Si with pt the use of RW and pt states he would give a try next session. Increased time spent on education and discussion with pt regarding the importance of safety awareness, fall precautions and activity level. Pt in understanding.  Advised pt to be up to ch Bed mobility; Body mechanics; Endurance; Energy conservation;Patient education; Family education;Strengthening;Stair training;Transfer training;Balance training  Rehab Potential : Good  Frequency (Obs): 3-5x/week  Number of Visits to Meet Established Goals: 5

## 2021-10-30 NOTE — OCCUPATIONAL THERAPY NOTE
OCCUPATIONAL THERAPY EVALUATION - INPATIENT     Room Number: 370/370-A  Evaluation Date: 10/30/2021  Type of Evaluation: Initial  Presenting Problem: Intractable back pain    Physician Order: IP Consult to Occupational Therapy  Reason for Therapy: ADL/IADL History  Past Surgical History:   Procedure Laterality Date   • DRUG-ELUTING STENTS, SINGLE  1989   • OTHER SURGICAL HISTORY  5-1-08    brachytherapy of prostate   • OTHER SURGICAL HISTORY  7/30/12    cysto, lt urs, litho, stone extraction of fragments, rp ASSESSMENT  AM-PAC ‘6-Clicks’ Inpatient Daily Activity Short Form  How much help from another person does the patient currently need…  -   Putting on and taking off regular lower body clothing?: A Little  -   Bathing (including washing, rinsing, drying)?: ADL assessment is 20 indicating that pt is a good Home OT/PT candidate based on 38 percentage of impairment.     In this OT evaluation patient presents with the following performance deficits: decreased ADL/IADL independence, decreased activity tolerance/en rolling:  with supervision  Patient will transfer from supine to sit:  with supervision    Additional Goals:  Patient will verbalize 3/3 back precautions. Patient will demonstrate carryover of back precautions with cues <50% of the session.

## 2021-10-30 NOTE — PLAN OF CARE
Assumed care of patient at 2300. A&Ox4. VSS. On room air. . Telemetry monitoring - NSR. Last BM 10/26. Voiding freely. PO medication for pain management. Ambulating with SBA. Patient updated on plan of care. Safety precautions in place.  Call light w

## 2021-10-30 NOTE — PLAN OF CARE
Pt c/o back pain. Norco given. Dr Emmie Stearns , spine surgeon notified RN that he will see pt on Monday.

## 2021-10-31 ENCOUNTER — ANESTHESIA EVENT (OUTPATIENT)
Dept: SURGERY | Facility: HOSPITAL | Age: 86
End: 2021-10-31
Payer: MEDICARE

## 2021-10-31 PROCEDURE — 96376 TX/PRO/DX INJ SAME DRUG ADON: CPT

## 2021-10-31 PROCEDURE — 80048 BASIC METABOLIC PNL TOTAL CA: CPT | Performed by: HOSPITALIST

## 2021-10-31 NOTE — PLAN OF CARE
Patient alert and oriented x4 ,gets up with min assist with walker . Vital signs stable ,using o2 at night when sleeping . C/o pain to lower back radiating to left hip controlled with morphine and norco . Denies any numbness or tingling .  Refusing for scd'

## 2021-10-31 NOTE — PROGRESS NOTES
[facility] Hospitalist Progress Note     PCP: Nu Lopez MD    CC:  Follow up    SUBJECTIVE:  Pain manageable currently.  Wife at bedside- asking about timing and details of procedure- deferred for spine team    OBJECTIVE:  Temp:  [97.5 °F (36.4 °C)-98 Intractable low back pain       80 yo with mmp including but not limited to CAD, HTN/HL, JAMESON, vertigo, who is admitted d/t L lower back pain radiating to LLE.     **L lower back pain radiating to LLE--   - MRI lumbar spine -disc extrusion to L5, L4  -spine

## 2021-10-31 NOTE — ANESTHESIA PREPROCEDURE EVALUATION
PRE-OP EVALUATION    Patient Name: Denilson Montanez    Admit Diagnosis: Lumbar pain [M54.50]    Pre-op Diagnosis: Lumbar pain [M54.50]    Lumbar 4 - Lumbar 5 Left Microdiscectomy    Anesthesia Procedure: Lumbar 4 - Lumbar 5 Left Microdiscectomy (Left Spine tab 40 mg, 40 mg, Oral, Nightly  [] enoxaparin (LOVENOX) 40 MG/0.4ML injection 40 mg, 40 mg, Subcutaneous, Daily        Outpatient Medications:   traMADol 50 MG Oral Tab, Take 50 mg by mouth 2 (two) times daily as needed for Pain., Disp: , Rfl:   Me presence Unsteadiness  White matter abnormality on MRI of brain Risk for falls  Stage 3a chronic kidney disease (HCC) Primary osteoarthritis of left knee  MCI (mild cognitive impairment) Intractable low back pain          Past Surgical History:   Procedure with the patient. The consent was signed without further questions.        Present on Admission:  **None**

## 2021-10-31 NOTE — PLAN OF CARE
Pt A&Ox4. RA. Wears 2L overnight. Pt denies any DAYNA, CP or calf pain at this time. NSR. Pt tolerating diet well, denies any N/V at this time. PRN norco given for pain. POC: NPO at midnight for sx tomorrow. Pt voiding. SBA+ cane.  Safety precautions in place level of mobility/gait  Description: Interventions:  - Assess patient's functional ability and stability  - Promote increasing activity/tolerance for mobility and gait  - Educate and engage patient/family in tolerated activity level and precautions    Outc

## 2021-11-01 ENCOUNTER — ANESTHESIA (OUTPATIENT)
Dept: SURGERY | Facility: HOSPITAL | Age: 86
End: 2021-11-01
Payer: MEDICARE

## 2021-11-01 ENCOUNTER — APPOINTMENT (OUTPATIENT)
Dept: GENERAL RADIOLOGY | Facility: HOSPITAL | Age: 86
End: 2021-11-01
Attending: ORTHOPAEDIC SURGERY
Payer: MEDICARE

## 2021-11-01 PROCEDURE — 01NB0ZZ RELEASE LUMBAR NERVE, OPEN APPROACH: ICD-10-PCS | Performed by: ORTHOPAEDIC SURGERY

## 2021-11-01 PROCEDURE — 76000 FLUOROSCOPY <1 HR PHYS/QHP: CPT | Performed by: ORTHOPAEDIC SURGERY

## 2021-11-01 PROCEDURE — 0ST20ZZ RESECTION OF LUMBAR VERTEBRAL DISC, OPEN APPROACH: ICD-10-PCS | Performed by: ORTHOPAEDIC SURGERY

## 2021-11-01 RX ORDER — ZOLPIDEM TARTRATE 5 MG/1
5 TABLET ORAL NIGHTLY PRN
Status: DISCONTINUED | OUTPATIENT
Start: 2021-11-01 | End: 2021-11-02

## 2021-11-01 RX ORDER — SENNOSIDES 8.6 MG
17.2 TABLET ORAL NIGHTLY
Status: DISCONTINUED | OUTPATIENT
Start: 2021-11-01 | End: 2021-11-02

## 2021-11-01 RX ORDER — METOCLOPRAMIDE HYDROCHLORIDE 5 MG/ML
10 INJECTION INTRAMUSCULAR; INTRAVENOUS AS NEEDED
Status: DISCONTINUED | OUTPATIENT
Start: 2021-11-01 | End: 2021-11-01 | Stop reason: HOSPADM

## 2021-11-01 RX ORDER — ACETAMINOPHEN AND CODEINE PHOSPHATE 300; 30 MG/1; MG/1
1 TABLET ORAL EVERY 6 HOURS PRN
Qty: 30 TABLET | Refills: 0 | Status: SHIPPED | OUTPATIENT
Start: 2021-11-01 | End: 2021-11-15

## 2021-11-01 RX ORDER — CEFAZOLIN SODIUM/WATER 2 G/20 ML
2 SYRINGE (ML) INTRAVENOUS EVERY 8 HOURS
Status: COMPLETED | OUTPATIENT
Start: 2021-11-02 | End: 2021-11-02

## 2021-11-01 RX ORDER — SODIUM CHLORIDE, SODIUM LACTATE, POTASSIUM CHLORIDE, CALCIUM CHLORIDE 600; 310; 30; 20 MG/100ML; MG/100ML; MG/100ML; MG/100ML
INJECTION, SOLUTION INTRAVENOUS CONTINUOUS PRN
Status: DISCONTINUED | OUTPATIENT
Start: 2021-11-01 | End: 2021-11-01 | Stop reason: SURG

## 2021-11-01 RX ORDER — BUPIVACAINE HYDROCHLORIDE 5 MG/ML
INJECTION, SOLUTION EPIDURAL; INTRACAUDAL AS NEEDED
Status: DISCONTINUED | OUTPATIENT
Start: 2021-11-01 | End: 2021-11-01 | Stop reason: HOSPADM

## 2021-11-01 RX ORDER — MORPHINE SULFATE 4 MG/ML
2 INJECTION, SOLUTION INTRAMUSCULAR; INTRAVENOUS EVERY 5 MIN PRN
Status: DISCONTINUED | OUTPATIENT
Start: 2021-11-01 | End: 2021-11-01 | Stop reason: HOSPADM

## 2021-11-01 RX ORDER — VANCOMYCIN HYDROCHLORIDE 1 G/20ML
INJECTION, POWDER, LYOPHILIZED, FOR SOLUTION INTRAVENOUS AS NEEDED
Status: DISCONTINUED | OUTPATIENT
Start: 2021-11-01 | End: 2021-11-01 | Stop reason: HOSPADM

## 2021-11-01 RX ORDER — MORPHINE SULFATE 2 MG/ML
2 INJECTION, SOLUTION INTRAMUSCULAR; INTRAVENOUS EVERY 2 HOUR PRN
Status: DISCONTINUED | OUTPATIENT
Start: 2021-11-01 | End: 2021-11-02

## 2021-11-01 RX ORDER — CEFAZOLIN SODIUM 1 G/3ML
INJECTION, POWDER, FOR SOLUTION INTRAMUSCULAR; INTRAVENOUS AS NEEDED
Status: DISCONTINUED | OUTPATIENT
Start: 2021-11-01 | End: 2021-11-01 | Stop reason: SURG

## 2021-11-01 RX ORDER — ONDANSETRON 2 MG/ML
INJECTION INTRAMUSCULAR; INTRAVENOUS AS NEEDED
Status: DISCONTINUED | OUTPATIENT
Start: 2021-11-01 | End: 2021-11-01 | Stop reason: SURG

## 2021-11-01 RX ORDER — DIPHENHYDRAMINE HYDROCHLORIDE 50 MG/ML
25 INJECTION INTRAMUSCULAR; INTRAVENOUS EVERY 4 HOURS PRN
Status: DISCONTINUED | OUTPATIENT
Start: 2021-11-01 | End: 2021-11-02

## 2021-11-01 RX ORDER — BISACODYL 10 MG
10 SUPPOSITORY, RECTAL RECTAL
Status: DISCONTINUED | OUTPATIENT
Start: 2021-11-01 | End: 2021-11-02

## 2021-11-01 RX ORDER — PHENYLEPHRINE HCL 10 MG/ML
VIAL (ML) INJECTION AS NEEDED
Status: DISCONTINUED | OUTPATIENT
Start: 2021-11-01 | End: 2021-11-01 | Stop reason: SURG

## 2021-11-01 RX ORDER — ACETAMINOPHEN AND CODEINE PHOSPHATE 300; 30 MG/1; MG/1
1 TABLET ORAL EVERY 6 HOURS PRN
Status: DISCONTINUED | OUTPATIENT
Start: 2021-11-01 | End: 2021-11-02

## 2021-11-01 RX ORDER — METHYLPREDNISOLONE ACETATE 40 MG/ML
INJECTION, SUSPENSION INTRA-ARTICULAR; INTRALESIONAL; INTRAMUSCULAR; SOFT TISSUE AS NEEDED
Status: DISCONTINUED | OUTPATIENT
Start: 2021-11-01 | End: 2021-11-01 | Stop reason: HOSPADM

## 2021-11-01 RX ORDER — ROCURONIUM BROMIDE 10 MG/ML
INJECTION, SOLUTION INTRAVENOUS AS NEEDED
Status: DISCONTINUED | OUTPATIENT
Start: 2021-11-01 | End: 2021-11-01 | Stop reason: SURG

## 2021-11-01 RX ORDER — POLYETHYLENE GLYCOL 3350 17 G/17G
17 POWDER, FOR SOLUTION ORAL DAILY PRN
Status: DISCONTINUED | OUTPATIENT
Start: 2021-11-01 | End: 2021-11-02

## 2021-11-01 RX ORDER — PROCHLORPERAZINE EDISYLATE 5 MG/ML
10 INJECTION INTRAMUSCULAR; INTRAVENOUS EVERY 6 HOURS PRN
Status: DISCONTINUED | OUTPATIENT
Start: 2021-11-01 | End: 2021-11-02

## 2021-11-01 RX ORDER — SODIUM CHLORIDE, SODIUM LACTATE, POTASSIUM CHLORIDE, CALCIUM CHLORIDE 600; 310; 30; 20 MG/100ML; MG/100ML; MG/100ML; MG/100ML
INJECTION, SOLUTION INTRAVENOUS CONTINUOUS
Status: DISCONTINUED | OUTPATIENT
Start: 2021-11-01 | End: 2021-11-01 | Stop reason: HOSPADM

## 2021-11-01 RX ORDER — DIPHENHYDRAMINE HCL 25 MG
25 CAPSULE ORAL EVERY 4 HOURS PRN
Status: DISCONTINUED | OUTPATIENT
Start: 2021-11-01 | End: 2021-11-02

## 2021-11-01 RX ORDER — MORPHINE SULFATE 2 MG/ML
1 INJECTION, SOLUTION INTRAMUSCULAR; INTRAVENOUS EVERY 2 HOUR PRN
Status: DISCONTINUED | OUTPATIENT
Start: 2021-11-01 | End: 2021-11-02

## 2021-11-01 RX ORDER — MORPHINE SULFATE 4 MG/ML
4 INJECTION, SOLUTION INTRAMUSCULAR; INTRAVENOUS EVERY 2 HOUR PRN
Status: DISCONTINUED | OUTPATIENT
Start: 2021-11-01 | End: 2021-11-02

## 2021-11-01 RX ORDER — LIDOCAINE HYDROCHLORIDE 10 MG/ML
INJECTION, SOLUTION EPIDURAL; INFILTRATION; INTRACAUDAL; PERINEURAL AS NEEDED
Status: DISCONTINUED | OUTPATIENT
Start: 2021-11-01 | End: 2021-11-01 | Stop reason: SURG

## 2021-11-01 RX ORDER — ACETAMINOPHEN 10 MG/ML
1000 INJECTION, SOLUTION INTRAVENOUS ONCE AS NEEDED
Status: DISCONTINUED | OUTPATIENT
Start: 2021-11-01 | End: 2021-11-01 | Stop reason: HOSPADM

## 2021-11-01 RX ORDER — ONDANSETRON 2 MG/ML
4 INJECTION INTRAMUSCULAR; INTRAVENOUS AS NEEDED
Status: DISCONTINUED | OUTPATIENT
Start: 2021-11-01 | End: 2021-11-01 | Stop reason: HOSPADM

## 2021-11-01 RX ORDER — NALOXONE HYDROCHLORIDE 0.4 MG/ML
80 INJECTION, SOLUTION INTRAMUSCULAR; INTRAVENOUS; SUBCUTANEOUS AS NEEDED
Status: DISCONTINUED | OUTPATIENT
Start: 2021-11-01 | End: 2021-11-01 | Stop reason: HOSPADM

## 2021-11-01 RX ORDER — DEXAMETHASONE SODIUM PHOSPHATE 4 MG/ML
VIAL (ML) INJECTION AS NEEDED
Status: DISCONTINUED | OUTPATIENT
Start: 2021-11-01 | End: 2021-11-01 | Stop reason: SURG

## 2021-11-01 RX ORDER — ONDANSETRON 2 MG/ML
4 INJECTION INTRAMUSCULAR; INTRAVENOUS EVERY 4 HOURS PRN
Status: DISCONTINUED | OUTPATIENT
Start: 2021-11-01 | End: 2021-11-02

## 2021-11-01 RX ORDER — DOCUSATE SODIUM 100 MG/1
100 CAPSULE, LIQUID FILLED ORAL 2 TIMES DAILY
Status: DISCONTINUED | OUTPATIENT
Start: 2021-11-01 | End: 2021-11-02

## 2021-11-01 RX ORDER — SODIUM PHOSPHATE, DIBASIC AND SODIUM PHOSPHATE, MONOBASIC 7; 19 G/133ML; G/133ML
1 ENEMA RECTAL ONCE AS NEEDED
Status: DISCONTINUED | OUTPATIENT
Start: 2021-11-01 | End: 2021-11-02

## 2021-11-01 RX ORDER — LIDOCAINE HYDROCHLORIDE AND EPINEPHRINE 10; 10 MG/ML; UG/ML
INJECTION, SOLUTION INFILTRATION; PERINEURAL AS NEEDED
Status: DISCONTINUED | OUTPATIENT
Start: 2021-11-01 | End: 2021-11-01 | Stop reason: HOSPADM

## 2021-11-01 RX ADMIN — PHENYLEPHRINE HCL 200 MCG: 10 MG/ML VIAL (ML) INJECTION at 17:15:00

## 2021-11-01 RX ADMIN — ROCURONIUM BROMIDE 50 MG: 10 INJECTION, SOLUTION INTRAVENOUS at 16:40:00

## 2021-11-01 RX ADMIN — PHENYLEPHRINE HCL 50 MCG: 10 MG/ML VIAL (ML) INJECTION at 16:49:00

## 2021-11-01 RX ADMIN — PHENYLEPHRINE HCL 100 MCG: 10 MG/ML VIAL (ML) INJECTION at 17:06:00

## 2021-11-01 RX ADMIN — ONDANSETRON 4 MG: 2 INJECTION INTRAMUSCULAR; INTRAVENOUS at 17:24:00

## 2021-11-01 RX ADMIN — PHENYLEPHRINE HCL 200 MCG: 10 MG/ML VIAL (ML) INJECTION at 17:02:00

## 2021-11-01 RX ADMIN — LIDOCAINE HYDROCHLORIDE 50 MG: 10 INJECTION, SOLUTION EPIDURAL; INFILTRATION; INTRACAUDAL; PERINEURAL at 16:36:00

## 2021-11-01 RX ADMIN — PHENYLEPHRINE HCL 100 MCG: 10 MG/ML VIAL (ML) INJECTION at 17:27:00

## 2021-11-01 RX ADMIN — CEFAZOLIN SODIUM 2 G: 1 INJECTION, POWDER, FOR SOLUTION INTRAMUSCULAR; INTRAVENOUS at 16:49:00

## 2021-11-01 RX ADMIN — PHENYLEPHRINE HCL 100 MCG: 10 MG/ML VIAL (ML) INJECTION at 17:31:00

## 2021-11-01 RX ADMIN — DEXAMETHASONE SODIUM PHOSPHATE 4 MG: 4 MG/ML VIAL (ML) INJECTION at 17:24:00

## 2021-11-01 RX ADMIN — SODIUM CHLORIDE, SODIUM LACTATE, POTASSIUM CHLORIDE, CALCIUM CHLORIDE: 600; 310; 30; 20 INJECTION, SOLUTION INTRAVENOUS at 16:35:00

## 2021-11-01 NOTE — HOME CARE LIAISON
Patient' spouse Junie Smith provided with list of [facility] providers from [facility], patient choice is [address]. Agency reserved in [facility] and contact information placed on AVS.   Financial interest disclosure provided to patient. [facility]

## 2021-11-01 NOTE — PLAN OF CARE
ER admit 10/28 Low back pain, Pt is AAOX4, VSS, room air, OSR, TELE, 2L O2 PRN overnight, [place] w/ devices at home, voiding freely to urinal, up SBA w/ cane, plan for L4-L5 microdiscectomy today, IV meds for pain control, plan for procedure, PT / OT eval, di

## 2021-11-01 NOTE — PLAN OF CARE
Tramadol given for lower back and lt thigh/hip pain with adequate relief. Pt NPO started midnight, OR scheduled today at 1500. Afebrile, BP slightly elevated d/t pain, will closely monitor. Pt able to ambulate using cane with SBA. Denies n/t to BLE.

## 2021-11-01 NOTE — PAYOR COMM NOTE
--------------  ADMISSION REVIEW     Payor: [facility] Keshav Mcgrath [place] #:  [id number]  Authorization Number: [id number]       ED Provider Notes        History   Patient presents with:  Back Pain  Leg Pain    Stated Complaint: sent by ortho for a Past Surgical History:   Procedure Laterality Date   • DRUG-ELUTING STENTS, SINGLE  1989   • OTHER SURGICAL HISTORY  5-1-08    brachytherapy of prostate   • OTHER SURGICAL HISTORY  7/30/12    cysto, lt urs, litho, stone extraction of fragments, rpg, normal limits   BASIC METABOLIC PANEL (8) - Abnormal; Notable for the following components:    Glucose 100 (*)     BUN 46 (*)     Creatinine 1.43 (*)     Calculated Osmolality 302 (*)     GFR, Non- 44 (*)     GFR, -American 51 (*) resolved. Recent Labs   Lab 10/29/21  0635 10/30/21  0538   WBC 8.7 9.1   HGB 15.2 15.3   MCV 99.6 98.5   .0 244. 0                Recent Labs   Lab 10/28/21  1827 10/29/21  0635 10/30/21  0538 10/31/21  0701   NA  --  140 139 140   K  --  4

## 2021-11-01 NOTE — PROGRESS NOTES
S: Patient awake in PACU. Inspection:  Awake alert No acute distress. No difficulty breathing     Blood pressure (!) 179/79, pulse 92, temperature 98.1 °F (36.7 °C), resp. rate 16, weight 185 lb 9.6 oz (84.2 kg), SpO2 92 %.     Recent Labs   Lab 10/30/2

## 2021-11-01 NOTE — ANESTHESIA POSTPROCEDURE EVALUATION
[address] [facility] Patient Status:  Inpatient   Age/Gender 81year old male MRN [id number]   Location [facility] Attending Charles Cary MD   Hosp Day # 1 PCP Ania Montes MD       Anesthesia Post-

## 2021-11-01 NOTE — CONSULTS
Patient: Duane Harrow  Medical Record Number: [id number]  Referring Physician:  Diego Barber  PCP: Diego Barber MD        HISTORY OF CHIEF COMPLAINT:    Duane Harrow is a 81year old male, who comes today to discuss 1 week h/o L hip a hypertension     • Unspecified transient cerebral ischemia     • Vertigo       80 y/o presented with chronic gait disturbance, unsteadiness, dizziness, no vertigo or n/v when asked him in detail.  He had almost every two years of brain MRI due to his simila    PHYSICAL EXAMIMATION   PHYSICAL EXAMINATION: Hector Choe is a 81year old male who is observed sitting in the exam room alert and oriented times three. RESPIRATORY RATE: 18 He looks consistent his stated age.     Ht 5' 8\" (1.727 m)   Wt 175 lb ( radiculopathy patient with worse symptoms I have counseled the family and suggested the patient go back to the hospital to be admitted since his pain is poorly controlled if the patient pain continues and unable to be controlled with medication and PT for

## 2021-11-01 NOTE — ANESTHESIA PROCEDURE NOTES
Peripheral IV  Date/Time: 11/1/2021 5:21 PM  Inserted by:  Abram Beasley MD    Placement  Needle size: 18 G  Laterality: left  Location: arm  Local anesthetic: none  Site prep: alcohol  Technique: anatomical landmarks  Attempts: 1

## 2021-11-01 NOTE — CM/SW NOTE
MSW acknowledged Flavia Palomo recommendation by PT. Patient will have surgery for microdiscectomy today. SW will check recommendations after surgery but sent preliminary referrals via [employer] for Flavia Palomo. Will present patient with choice list once available.   Order an

## 2021-11-01 NOTE — PROGRESS NOTES
[facility] Hospitalist Progress Note     PCP: Alyssa Monzon MD    CC:  Follow up    SUBJECTIVE:  Laying in bed, intense pain. Some reflux sxs. No cp/sob. 144/76, 77.      OBJECTIVE:  Temp:  [97.7 °F (36.5 °C)-98.3 °F (36.8 °C)] 98.3 °F (36.8 °C)  Pulse: including but not limited to CAD, HTN/HL, JAMESON, vertigo, who is admitted d/t L lower back pain radiating to LLE.     **L lower back pain radiating to LLE--   - MRI lumbar spine -disc extrusion to L5, L4  -spine consult, appreciate.  Dr. Maite Sousa is for L4-

## 2021-11-01 NOTE — ANESTHESIA PROCEDURE NOTES
Airway  Date/Time: 11/1/2021 4:45 PM  Urgency: elective      General Information and Staff    Patient location during procedure: OR  Anesthesiologist: Cindy Gaspar MD  Performed: anesthesiologist     Indications and Patient Condition  Indication

## 2021-11-01 NOTE — BRIEF OP NOTE
Pre-Operative Diagnosis: Lumbar pain [M54.50]     Post-Operative Diagnosis: Lumbar pain [M54.50]      Procedure Performed:   Lumbar 4 - Lumbar 5 Left Microdiscectomy    Surgeon(s) and Role:     Kennedy Guzmán MD - Primary    Assistant(s):  PA:  Castillo Ny

## 2021-11-01 NOTE — PHYSICAL THERAPY NOTE
Chart reviewed. Pt is scheduled for a microdiscectomy later today. Will hold inpt PT today and will cont to follow with a re-eval post op when appropriate. Please place new orders post op when appropriate.

## 2021-11-02 VITALS
HEART RATE: 92 BPM | DIASTOLIC BLOOD PRESSURE: 88 MMHG | SYSTOLIC BLOOD PRESSURE: 117 MMHG | OXYGEN SATURATION: 93 % | RESPIRATION RATE: 18 BRPM | BODY MASS INDEX: 28 KG/M2 | TEMPERATURE: 98 F | WEIGHT: 185.63 LBS

## 2021-11-02 PROCEDURE — 97535 SELF CARE MNGMENT TRAINING: CPT

## 2021-11-02 PROCEDURE — 97165 OT EVAL LOW COMPLEX 30 MIN: CPT

## 2021-11-02 PROCEDURE — 85014 HEMATOCRIT: CPT | Performed by: PHYSICIAN ASSISTANT

## 2021-11-02 PROCEDURE — 85018 HEMOGLOBIN: CPT | Performed by: PHYSICIAN ASSISTANT

## 2021-11-02 PROCEDURE — 97164 PT RE-EVAL EST PLAN CARE: CPT

## 2021-11-02 PROCEDURE — 97116 GAIT TRAINING THERAPY: CPT

## 2021-11-02 NOTE — OCCUPATIONAL THERAPY NOTE
OCCUPATIONAL THERAPY QUICK EVALUATION - INPATIENT    Room Number: 370/370-A  Evaluation Date: 11/2/2021     Type of Evaluation: Quick Eval  Presenting Problem: s/p L4-5 microdiscectomy on 11/1     Physician Order: IP Consult to Occupational Therapy  Reason in detail. He had almost every two years of brain MRI due to his similar complaints. I have reviewed his brain MRI image with him and his wife which showed no evidence of NPH, but moderate white matter changes.         Past Surgical History  Past Surgical H ASSESSMENT  AM-PAC ‘6-Clicks’ Inpatient Daily Activity Short Form   How much help from another person does the patient currently need…  -   Putting on and taking off regular lower body clothing?: A Little   -   Bathing (including washing, rinsing, drying)? microdiscectomy. Complete medical history and occupational profile noted above. Functional outcome measures completed include AM-PAC.  Pt's raw score for the AM-PAC ADL assessment is 20 indicating that pt is a good home DC candidate based on 38 percentage o

## 2021-11-02 NOTE — PROGRESS NOTES
Assisted patient to sit up in bed for lunch per his request. Reviewed indications, side effects of pain medication/narcotics and constipation prevention.  Stressed importance of increased fluids/roughage in diet, continued use stool softeners along with lax

## 2021-11-02 NOTE — PHYSICAL THERAPY NOTE
PHYSICAL THERAPY SPINE RE-EVALUATION - INPATIENT     Room Number: 370/370-A  Evaluation Date: 11/2/2021  Type of Evaluation: Re-evaluation  Physician Order: PT Eval and Treat    Presenting Problem: back pain; s/p microdiscectomy on 11/1/21  Reason fo Patient is able to demonstrate transfers Sit to/from Stand at assistance level: supervison     Goal #3    Patient is able to ambulate 150 feet with assistive device at assistance level: modified independent    Goal #4    Patient will negotiate 4 stairs/one ASSESSMENT  Upper extremity ROM and strength = See OT eval.      Lower extremity ROM is within functional limits     Lower extremity strength is within functional limits except for the following:    Left Hip flexion  3/5  Left Knee extension  3/5  Left Kne balance for urination at toilet = mod indep  Sit<>stand at toilet for BM and perineal care = mod indep. Stairs: asc/jose with B rails and cga for 4 steps. 4 inch stoop = RW and cga.      Reviewed spinal precautions - no BLT (no bend, no lift over 8-10#, no

## 2021-11-02 NOTE — PROGRESS NOTES
[facility] Hospitalist Progress Note     PCP: Liang Rey MD    CC:  Follow up    SUBJECTIVE:  Taking PO, no flatus. About to work c therapy. Feels well, no n/v/f/c. Using IS a little.      OBJECTIVE:  Temp:  [97.5 °F (36.4 °C)-98.4 °F (36.9 °C)] 98 °F HYDROcodone-acetaminophen **OR** [DISCONTINUED] HYDROcodone-acetaminophen, polyethylene glycol (PEG 3350), bisacodyl, Fleet Enema, [MAR Hold] ondansetron, metoclopramide       Assessment/Plan:     Principal Problem:    Intractable low back pain       80 yo

## 2021-11-02 NOTE — PLAN OF CARE
Surgical pain mild. Denies numbness, tingling, pain to legs. Up to chair with walker and min assist. Jesika Carlson dressing clean and dry to back. PT/OT to see today. Home if cleared by all services. Plan of care discussed with pt and spouse.

## 2021-11-02 NOTE — PROGRESS NOTES
Discharge instructions reviewed with pt and spouse. All questions answered. Discharge video played for pt and spouse. IV removed. T3 being filled by [facility] Beds prior to discharge.

## 2021-11-02 NOTE — PLAN OF CARE
Patient resting in room, on 2L per NC. SCD's bilaterally. Tele. IS encouraged. DTV. [place] with hearing aids at home. PT/OT to see. [place] to back C/D/I. Patient declines n/t to bilateral lower extremities.  Patient reports pain is mild, declined pain medicat

## 2021-11-02 NOTE — OPERATIVE REPORT
Operative Report  Patient Name:  Daysi Leyva  Date: 11/1/2021  Preoperative Diagnosis: L4-5 disc hernation  Postoperative Diagnosis: Same  Primary Surgeon: Gladis Chaudhary MD  Assistant: Cecilio MUNIZ  Procedures:   L4-5 L removal of disc herniat the interlaminar space and a lateral fluoroscopic image confirmed appropriate position for decompression. Self-retaining Shin retractor was placed.   High-speed bur was used to create a small laminotomy and identify the ligamentum flavum taking care

## 2021-11-03 NOTE — DISCHARGE SUMMARY
[facility] Medicine Discharge Summary   Patient ID:  Duane Harrow  [id number]  81year old  3/23/1935    Admit date: 10/28/2021    Discharge date and time: 11/2/2021     Attending Physician: Kati Barnett MD    Primary Care Physician: Renea ROMANO known as: MIRAPEX  TAKE 2 TABLETS BY MOUTH  DAILY FOR 1 WEEK THEN 1  TABLET BY MOUTH DAILY     rosuvastatin 40 MG Tabs  Commonly known as: CRESTOR  TAKE 1 TABLET BY MOUTH AT  NIGHT     VITAMIN B COMPLEX OR        STOP taking these medications    acetaminop Sh[place] diffuse disc bulge extending into both neural foramen, and normal facets. No central canal or foraminal stenosis.  Schmorl's node is seen along superior L3 L3-L4: Small diffuse disc bulge extending into both neural foramen with minimal endplate ridg 10/29/2021  DATE OF SERVICE: 10.27.2021 MRI OF THE LUMBAR SPINE WITHOUT CONTRAST CLINICAL INFORMATION:  Lumbar radiculopathy. Severe low back pain with right hip radiation. COMPARISON STUDY:  Plain films dated 5/20/2021 demonstrating 5 lumbar vertebrae.  TE L5 and possibly left foraminal L4. A free fragment cannot be excluded. Bilateral facet and ligamentous enlargement is noted. This results in mild central canal, bilateral subarticular, and mild right with compressive moderate left foraminal stenosis.  Corre EXAMINED:  Left leg SAPHENOFEMORAL JUNCTION:  No reflux. THROMBI:  None visible. COMPRESSION:  Normal compressibility, phasicity, and augmentation. CONCLUSION:  No DVT in the left leg.     Dictated by (CST): Colbert Libman, MD on 10/19/2021 at 2:0 Order    Total Time Coordinating Care: Greater than 30 minutes    Patient had opportunity to ask questions and state understand and agree with therapeutic plan as outlined.  I reviewed and reconciled current and discharge medications on day of discharge and

## 2021-11-04 NOTE — PAYOR COMM NOTE
Discharge Notification    Patient Name: Guero Martin: [facility] #: [id number]  Authorization Number: [id number]  Admit Date/Time: 10/28/2021 10:42 AM  Discharge Date/Time: 11/2/2021 5:49 PM

## 2021-11-08 PROBLEM — M19.041 OSTEOARTHRITIS, HAND, PRIMARY LOCALIZED, RIGHT: Status: ACTIVE | Noted: 2020-02-17

## 2021-11-23 PROBLEM — E55.9 VITAMIN D INSUFFICIENCY: Status: ACTIVE | Noted: 2021-11-23

## 2022-09-13 NOTE — PROCEDURES
[address] [address],[facility] [address]       Accredited by the [facility] ([facility])    PATIENT'S NAME:        Adriane Kidd PHYSICIAN:   Mason Cuellar M.D.   REFERRING PHYSICIAN:     PATIENT ACCOUNT #: LM pt is needing a PA on a medication trulance. The PA should have came from the GI but since we have started this I was going to try to finish it. Covermymed patient it not found. I called the number on the back of insurance card and member is not found. Patient will need to call her insurance to figure out what is wrong.      MOVEMENTS:  PLM index is 10. PLM arousal index is 3. EEG:  With the limited recording montage, no EEG abnormalities were detected. EKG: The EKG demonstrates sinus rhythm. IMPRESSION:  Successful CPAP titration. RECOMMENDATIONS:    1.    The pa

## 2022-09-21 ENCOUNTER — HOSPITAL ENCOUNTER (EMERGENCY)
Facility: HOSPITAL | Age: 87
Discharge: HOME | End: 2022-09-21
Attending: EMERGENCY MEDICINE

## 2022-09-21 ENCOUNTER — APPOINTMENT (OUTPATIENT)
Dept: GENERAL RADIOLOGY | Facility: HOSPITAL | Age: 87
End: 2022-09-21
Attending: EMERGENCY MEDICINE

## 2022-09-21 VITALS
OXYGEN SATURATION: 96 % | BODY MASS INDEX: 27.28 KG/M2 | RESPIRATION RATE: 16 BRPM | SYSTOLIC BLOOD PRESSURE: 117 MMHG | HEIGHT: 68 IN | WEIGHT: 180 LBS | TEMPERATURE: 98 F | HEART RATE: 64 BPM | DIASTOLIC BLOOD PRESSURE: 67 MMHG

## 2022-09-21 DIAGNOSIS — S49.92XA INJURY OF LEFT SHOULDER, INITIAL ENCOUNTER: Primary | ICD-10-CM

## 2022-09-21 PROCEDURE — 99284 EMERGENCY DEPT VISIT MOD MDM: CPT

## 2022-09-21 PROCEDURE — 73030 X-RAY EXAM OF SHOULDER: CPT | Performed by: EMERGENCY MEDICINE

## 2022-09-21 PROCEDURE — 99283 EMERGENCY DEPT VISIT LOW MDM: CPT

## 2022-09-21 RX ORDER — ACETAMINOPHEN AND CODEINE PHOSPHATE 300; 30 MG/1; MG/1
1-2 TABLET ORAL EVERY 6 HOURS PRN
Qty: 10 TABLET | Refills: 0 | Status: SHIPPED | OUTPATIENT
Start: 2022-09-21 | End: 2022-09-26

## 2022-09-21 NOTE — ED INITIAL ASSESSMENT (HPI)
Pt states he was brushing his dog last night and went to roll the dog. Pt weights aprox 70lb. Pt statse since then he has a pain to left shoulder. Pt denies N/T in left arm.

## 2022-10-21 ENCOUNTER — HOSPITAL ENCOUNTER (OUTPATIENT)
Age: 87
Discharge: HOME | End: 2022-10-21
Payer: MEDICARE

## 2022-10-21 ENCOUNTER — APPOINTMENT (OUTPATIENT)
Dept: GENERAL RADIOLOGY | Age: 87
End: 2022-10-21
Attending: PHYSICIAN ASSISTANT
Payer: MEDICARE

## 2022-10-21 VITALS
TEMPERATURE: 98 F | WEIGHT: 176.38 LBS | HEART RATE: 72 BPM | OXYGEN SATURATION: 94 % | DIASTOLIC BLOOD PRESSURE: 63 MMHG | SYSTOLIC BLOOD PRESSURE: 127 MMHG | BODY MASS INDEX: 27 KG/M2 | RESPIRATION RATE: 20 BRPM

## 2022-10-21 DIAGNOSIS — J20.9 ACUTE BRONCHITIS, UNSPECIFIED ORGANISM: ICD-10-CM

## 2022-10-21 DIAGNOSIS — J02.0 STREP PHARYNGITIS: Primary | ICD-10-CM

## 2022-10-21 LAB
S PYO AG THROAT QL: POSITIVE
SARS-COV-2 RNA RESP QL NAA+PROBE: NOT DETECTED

## 2022-10-21 PROCEDURE — 99213 OFFICE O/P EST LOW 20 MIN: CPT

## 2022-10-21 PROCEDURE — 87880 STREP A ASSAY W/OPTIC: CPT

## 2022-10-21 PROCEDURE — 71046 X-RAY EXAM CHEST 2 VIEWS: CPT | Performed by: PHYSICIAN ASSISTANT

## 2022-10-21 RX ORDER — BENZONATATE 100 MG/1
100 CAPSULE ORAL 3 TIMES DAILY PRN
Qty: 30 CAPSULE | Refills: 0 | Status: SHIPPED | OUTPATIENT
Start: 2022-10-21 | End: 2022-11-20

## 2022-10-21 RX ORDER — AMOXICILLIN 875 MG/1
875 TABLET, COATED ORAL 2 TIMES DAILY
Qty: 20 TABLET | Refills: 0 | Status: SHIPPED | OUTPATIENT
Start: 2022-10-21 | End: 2022-10-31

## 2022-12-04 ENCOUNTER — HOSPITAL ENCOUNTER (OUTPATIENT)
Age: 87
Discharge: HOME | End: 2022-12-04
Attending: EMERGENCY MEDICINE
Payer: MEDICARE

## 2022-12-04 VITALS
OXYGEN SATURATION: 96 % | TEMPERATURE: 97 F | DIASTOLIC BLOOD PRESSURE: 67 MMHG | SYSTOLIC BLOOD PRESSURE: 139 MMHG | HEIGHT: 67 IN | HEART RATE: 82 BPM | RESPIRATION RATE: 20 BRPM | BODY MASS INDEX: 28.25 KG/M2 | WEIGHT: 180 LBS

## 2022-12-04 DIAGNOSIS — J06.9 VIRAL UPPER RESPIRATORY TRACT INFECTION: Primary | ICD-10-CM

## 2022-12-04 LAB
POCT INFLUENZA A: NEGATIVE
POCT INFLUENZA B: NEGATIVE
S PYO AG THROAT QL: NEGATIVE
SARS-COV-2 RNA RESP QL NAA+PROBE: NOT DETECTED

## 2022-12-04 PROCEDURE — 87880 STREP A ASSAY W/OPTIC: CPT

## 2022-12-04 PROCEDURE — 87502 INFLUENZA DNA AMP PROBE: CPT | Performed by: EMERGENCY MEDICINE

## 2022-12-04 PROCEDURE — 99213 OFFICE O/P EST LOW 20 MIN: CPT

## 2022-12-04 PROCEDURE — 99214 OFFICE O/P EST MOD 30 MIN: CPT

## 2022-12-04 RX ORDER — BENZONATATE 100 MG/1
100 CAPSULE ORAL 3 TIMES DAILY PRN
Qty: 30 CAPSULE | Refills: 0 | Status: SHIPPED | OUTPATIENT
Start: 2022-12-04 | End: 2023-01-03

## 2022-12-18 ENCOUNTER — APPOINTMENT (OUTPATIENT)
Dept: GENERAL RADIOLOGY | Facility: HOSPITAL | Age: 87
End: 2022-12-18
Attending: EMERGENCY MEDICINE
Payer: MEDICARE

## 2022-12-18 ENCOUNTER — HOSPITAL ENCOUNTER (EMERGENCY)
Facility: HOSPITAL | Age: 87
Discharge: HOME | End: 2022-12-18
Attending: EMERGENCY MEDICINE
Payer: MEDICARE

## 2022-12-18 VITALS
OXYGEN SATURATION: 96 % | HEART RATE: 77 BPM | TEMPERATURE: 98 F | RESPIRATION RATE: 16 BRPM | SYSTOLIC BLOOD PRESSURE: 140 MMHG | WEIGHT: 180 LBS | HEIGHT: 68 IN | DIASTOLIC BLOOD PRESSURE: 78 MMHG | BODY MASS INDEX: 27.28 KG/M2

## 2022-12-18 DIAGNOSIS — S46.002A INJURY OF LEFT ROTATOR CUFF, INITIAL ENCOUNTER: Primary | ICD-10-CM

## 2022-12-18 PROCEDURE — 99283 EMERGENCY DEPT VISIT LOW MDM: CPT

## 2022-12-18 PROCEDURE — 73030 X-RAY EXAM OF SHOULDER: CPT | Performed by: EMERGENCY MEDICINE

## 2022-12-18 RX ORDER — ACETAMINOPHEN AND CODEINE PHOSPHATE 300; 30 MG/1; MG/1
1 TABLET ORAL EVERY 6 HOURS PRN
Qty: 10 TABLET | Refills: 0 | Status: SHIPPED | OUTPATIENT
Start: 2022-12-18 | End: 2022-12-28

## 2022-12-18 RX ORDER — ACETAMINOPHEN 500 MG
1000 TABLET ORAL ONCE
Status: COMPLETED | OUTPATIENT
Start: 2022-12-18 | End: 2022-12-18

## 2022-12-18 NOTE — ED INITIAL ASSESSMENT (HPI)
Pt presents to ED for c/o left arm pain. Pt states flipping a trailer door and felt tear. Pain from shoulder down to elbow.

## 2022-12-18 NOTE — DISCHARGE INSTRUCTIONS
Wear sling for support  May take 1 Tylenol and 1 Tylenol with codeine for pain.   Cold compresses topically to left shoulder 20 minutes every 2 hours while awake  Recheck with  on Monday

## 2023-04-14 NOTE — DISCHARGE INSTRUCTIONS
Onset: Past few days   Location / description: Spoke with pt's caregiver who stated pt is having labored and raspy breathing   Breathing will stop for 2- 5 minutes   Stated breathing has sounded like this for the past few days   Pt is able to be woken up easily   Precipitating Factors: COPD  Pain Scale (1-10), 10 highest: unk  What  improves / worsens symptoms: none  Symptom specific medications: unk  Recent visits (last 3-4 weeks) for same reason or recent surgery:  Yesterday home care visit - RN    PLAN:  Go to Emergency Department   Caregiver stated pt will not want to go to the ED     Patient/Caller does not plan to follow recommendations.    Reason for Disposition  • Sounds like a life-threatening emergency to the triager    Protocols used: BREATHING DIFFICULTY-A-AH       Use over-the-counter cough or cold medications. Take the Tessalon as prescribed as needed for coughing. Follow-up with your doctor of next 2 days return for worsening symptoms.

## 2023-05-06 ENCOUNTER — APPOINTMENT (OUTPATIENT)
Dept: CT IMAGING | Facility: HOSPITAL | Age: 88
End: 2023-05-06
Attending: EMERGENCY MEDICINE
Payer: MEDICARE

## 2023-05-06 ENCOUNTER — HOSPITAL ENCOUNTER (INPATIENT)
Facility: HOSPITAL | Age: 88
LOS: 2 days | Discharge: HOME | End: 2023-05-10
Attending: EMERGENCY MEDICINE | Admitting: INTERNAL MEDICINE
Payer: MEDICARE

## 2023-05-06 DIAGNOSIS — R55 SYNCOPE AND COLLAPSE: Primary | ICD-10-CM

## 2023-05-06 DIAGNOSIS — R74.01 TRANSAMINITIS: ICD-10-CM

## 2023-05-06 LAB
ALBUMIN SERPL-MCNC: 3.4 G/DL (ref 3.4–5)
ALBUMIN/GLOB SERPL: 1 {RATIO} (ref 1–2)
ALP LIVER SERPL-CCNC: 135 U/L
ALT SERPL-CCNC: 556 U/L
AMMONIA PLAS-MCNC: 45 UMOL/L (ref 11–32)
ANION GAP SERPL CALC-SCNC: 2 MMOL/L (ref 0–18)
AST SERPL-CCNC: 580 U/L (ref 15–37)
BASOPHILS # BLD AUTO: 0.01 X10(3) UL (ref 0–0.2)
BASOPHILS NFR BLD AUTO: 0.1 %
BILIRUB SERPL-MCNC: 1.4 MG/DL (ref 0.1–2)
BILIRUB UR QL STRIP.AUTO: NEGATIVE
BUN BLD-MCNC: 28 MG/DL (ref 7–18)
CALCIUM BLD-MCNC: 9.4 MG/DL (ref 8.5–10.1)
CHLORIDE SERPL-SCNC: 114 MMOL/L (ref 98–112)
CK SERPL-CCNC: 89 U/L
CO2 SERPL-SCNC: 22 MMOL/L (ref 21–32)
COLOR UR AUTO: YELLOW
CREAT BLD-MCNC: 1.3 MG/DL
EOSINOPHIL # BLD AUTO: 0 X10(3) UL (ref 0–0.7)
EOSINOPHIL NFR BLD AUTO: 0 %
ERYTHROCYTE [DISTWIDTH] IN BLOOD BY AUTOMATED COUNT: 12.8 %
GFR SERPLBLD BASED ON 1.73 SQ M-ARVRAT: 53 ML/MIN/1.73M2 (ref 60–?)
GLOBULIN PLAS-MCNC: 3.4 G/DL (ref 2.8–4.4)
GLUCOSE BLD-MCNC: 135 MG/DL (ref 70–99)
GLUCOSE UR STRIP.AUTO-MCNC: NEGATIVE MG/DL
HCT VFR BLD AUTO: 46.5 %
HGB BLD-MCNC: 15.4 G/DL
HYALINE CASTS #/AREA URNS AUTO: PRESENT /LPF
IMM GRANULOCYTES # BLD AUTO: 0.04 X10(3) UL (ref 0–1)
IMM GRANULOCYTES NFR BLD: 0.3 %
KETONES UR STRIP.AUTO-MCNC: NEGATIVE MG/DL
LEUKOCYTE ESTERASE UR QL STRIP.AUTO: NEGATIVE
LIPASE SERPL-CCNC: 33 U/L (ref 13–75)
LYMPHOCYTES # BLD AUTO: 0.13 X10(3) UL (ref 1–4)
LYMPHOCYTES NFR BLD AUTO: 1.1 %
MCH RBC QN AUTO: 32 PG (ref 26–34)
MCHC RBC AUTO-ENTMCNC: 33.1 G/DL (ref 31–37)
MCV RBC AUTO: 96.5 FL
MONOCYTES # BLD AUTO: 0.34 X10(3) UL (ref 0.1–1)
MONOCYTES NFR BLD AUTO: 2.9 %
NEUTROPHILS # BLD AUTO: 11.1 X10 (3) UL (ref 1.5–7.7)
NEUTROPHILS # BLD AUTO: 11.1 X10(3) UL (ref 1.5–7.7)
NEUTROPHILS NFR BLD AUTO: 95.6 %
NITRITE UR QL STRIP.AUTO: NEGATIVE
OSMOLALITY SERPL CALC.SUM OF ELEC: 294 MOSM/KG (ref 275–295)
PH UR STRIP.AUTO: 8 [PH] (ref 5–8)
PLATELET # BLD AUTO: 179 10(3)UL (ref 150–450)
POTASSIUM SERPL-SCNC: 4.4 MMOL/L (ref 3.5–5.1)
PROT SERPL-MCNC: 6.8 G/DL (ref 6.4–8.2)
PROT UR STRIP.AUTO-MCNC: 30 MG/DL
RBC # BLD AUTO: 4.82 X10(6)UL
RBC UR QL AUTO: NEGATIVE
SODIUM SERPL-SCNC: 138 MMOL/L (ref 136–145)
SP GR UR STRIP.AUTO: 1.02 (ref 1–1.03)
TROPONIN I HIGH SENSITIVITY: 8 NG/L
UROBILINOGEN UR STRIP.AUTO-MCNC: 2 MG/DL
WBC # BLD AUTO: 11.6 X10(3) UL (ref 4–11)

## 2023-05-06 PROCEDURE — 80053 COMPREHEN METABOLIC PANEL: CPT | Performed by: EMERGENCY MEDICINE

## 2023-05-06 PROCEDURE — 93005 ELECTROCARDIOGRAM TRACING: CPT

## 2023-05-06 PROCEDURE — 74177 CT ABD & PELVIS W/CONTRAST: CPT | Performed by: EMERGENCY MEDICINE

## 2023-05-06 PROCEDURE — 99285 EMERGENCY DEPT VISIT HI MDM: CPT

## 2023-05-06 PROCEDURE — 85025 COMPLETE CBC W/AUTO DIFF WBC: CPT | Performed by: EMERGENCY MEDICINE

## 2023-05-06 PROCEDURE — 82550 ASSAY OF CK (CPK): CPT | Performed by: EMERGENCY MEDICINE

## 2023-05-06 PROCEDURE — 83690 ASSAY OF LIPASE: CPT | Performed by: EMERGENCY MEDICINE

## 2023-05-06 PROCEDURE — 72125 CT NECK SPINE W/O DYE: CPT | Performed by: EMERGENCY MEDICINE

## 2023-05-06 PROCEDURE — 70450 CT HEAD/BRAIN W/O DYE: CPT | Performed by: EMERGENCY MEDICINE

## 2023-05-06 PROCEDURE — 81001 URINALYSIS AUTO W/SCOPE: CPT | Performed by: EMERGENCY MEDICINE

## 2023-05-06 PROCEDURE — 96374 THER/PROPH/DIAG INJ IV PUSH: CPT

## 2023-05-06 PROCEDURE — 82140 ASSAY OF AMMONIA: CPT | Performed by: EMERGENCY MEDICINE

## 2023-05-06 PROCEDURE — 93010 ELECTROCARDIOGRAM REPORT: CPT

## 2023-05-06 PROCEDURE — 84484 ASSAY OF TROPONIN QUANT: CPT | Performed by: EMERGENCY MEDICINE

## 2023-05-06 RX ORDER — ONDANSETRON 2 MG/ML
INJECTION INTRAMUSCULAR; INTRAVENOUS
Status: COMPLETED
Start: 2023-05-06 | End: 2023-05-06

## 2023-05-06 RX ORDER — SODIUM CHLORIDE 9 MG/ML
INJECTION, SOLUTION INTRAVENOUS CONTINUOUS
Status: DISCONTINUED | OUTPATIENT
Start: 2023-05-06 | End: 2023-05-10

## 2023-05-06 RX ORDER — ACETAMINOPHEN AND CODEINE PHOSPHATE 300; 30 MG/1; MG/1
1 TABLET ORAL EVERY 4 HOURS PRN
Status: DISCONTINUED | OUTPATIENT
Start: 2023-05-06 | End: 2023-05-10

## 2023-05-06 RX ORDER — ONDANSETRON 2 MG/ML
4 INJECTION INTRAMUSCULAR; INTRAVENOUS EVERY 6 HOURS PRN
Status: DISCONTINUED | OUTPATIENT
Start: 2023-05-06 | End: 2023-05-10

## 2023-05-06 RX ORDER — PANTOPRAZOLE SODIUM 40 MG/1
40 TABLET, DELAYED RELEASE ORAL
Status: DISCONTINUED | OUTPATIENT
Start: 2023-05-07 | End: 2023-05-10

## 2023-05-06 RX ORDER — METOCLOPRAMIDE HYDROCHLORIDE 5 MG/ML
10 INJECTION INTRAMUSCULAR; INTRAVENOUS EVERY 8 HOURS PRN
Status: DISCONTINUED | OUTPATIENT
Start: 2023-05-06 | End: 2023-05-10

## 2023-05-06 RX ORDER — FENOFIBRATE 134 MG/1
134 CAPSULE ORAL
Status: DISCONTINUED | OUTPATIENT
Start: 2023-05-07 | End: 2023-05-10

## 2023-05-06 RX ORDER — ONDANSETRON 2 MG/ML
4 INJECTION INTRAMUSCULAR; INTRAVENOUS ONCE
Status: COMPLETED | OUTPATIENT
Start: 2023-05-06 | End: 2023-05-06

## 2023-05-06 RX ORDER — HEPARIN SODIUM 5000 [USP'U]/ML
5000 INJECTION, SOLUTION INTRAVENOUS; SUBCUTANEOUS EVERY 8 HOURS SCHEDULED
Status: DISCONTINUED | OUTPATIENT
Start: 2023-05-06 | End: 2023-05-10

## 2023-05-06 NOTE — ED INITIAL ASSESSMENT (HPI)
A&Ox3 patient bib ems for c/o fall    Patient had unwitnessed fall in the shower unsure if had LOC, unsure if on any blood thinning medications, none listed on file    C/o neck/back pain, c-collar in place    RR even/NL    Wife arrives and confirms patient is not on any [facility] therapy.  Also endorses patient has chronic back and L shoulder pain

## 2023-05-06 NOTE — ED QUICK NOTES
RN has explained to patient multiple times the importance of keeping c-collar on properly and that he is to remain NPO until CT scan results are back    Patient raising voice and yelling at this RN  Able to de-escalate patient at this time    RR even/NL, on continuous monitoring  Call light within reach

## 2023-05-07 ENCOUNTER — APPOINTMENT (OUTPATIENT)
Dept: CT IMAGING | Facility: HOSPITAL | Age: 88
End: 2023-05-07
Attending: HOSPITALIST
Payer: MEDICARE

## 2023-05-07 LAB
ALBUMIN SERPL-MCNC: 2.9 G/DL (ref 3.4–5)
ALBUMIN SERPL-MCNC: 3.1 G/DL (ref 3.4–5)
ALBUMIN/GLOB SERPL: 1 {RATIO} (ref 1–2)
ALBUMIN/GLOB SERPL: 1.1 {RATIO} (ref 1–2)
ALP LIVER SERPL-CCNC: 117 U/L
ALP LIVER SERPL-CCNC: 134 U/L
ALT SERPL-CCNC: 378 U/L
ALT SERPL-CCNC: 441 U/L
AMMONIA PLAS-MCNC: 48 UMOL/L (ref 11–32)
ANION GAP SERPL CALC-SCNC: 4 MMOL/L (ref 0–18)
ANION GAP SERPL CALC-SCNC: 6 MMOL/L (ref 0–18)
APAP SERPL-MCNC: <2 UG/ML (ref 10–30)
AST SERPL-CCNC: 299 U/L (ref 15–37)
AST SERPL-CCNC: 407 U/L (ref 15–37)
ATRIAL RATE: 80 BPM
BASOPHILS # BLD AUTO: 0.02 X10(3) UL (ref 0–0.2)
BASOPHILS NFR BLD AUTO: 0.3 %
BILIRUB SERPL-MCNC: 1.2 MG/DL (ref 0.1–2)
BILIRUB SERPL-MCNC: 1.2 MG/DL (ref 0.1–2)
BUN BLD-MCNC: 22 MG/DL (ref 7–18)
BUN BLD-MCNC: 24 MG/DL (ref 7–18)
CALCIUM BLD-MCNC: 8.5 MG/DL (ref 8.5–10.1)
CALCIUM BLD-MCNC: 9 MG/DL (ref 8.5–10.1)
CHLORIDE SERPL-SCNC: 107 MMOL/L (ref 98–112)
CHLORIDE SERPL-SCNC: 112 MMOL/L (ref 98–112)
CO2 SERPL-SCNC: 21 MMOL/L (ref 21–32)
CO2 SERPL-SCNC: 22 MMOL/L (ref 21–32)
CREAT BLD-MCNC: 1.18 MG/DL
CREAT BLD-MCNC: 1.26 MG/DL
EOSINOPHIL # BLD AUTO: 0.01 X10(3) UL (ref 0–0.7)
EOSINOPHIL NFR BLD AUTO: 0.1 %
ERYTHROCYTE [DISTWIDTH] IN BLOOD BY AUTOMATED COUNT: 12.9 %
GFR SERPLBLD BASED ON 1.73 SQ M-ARVRAT: 55 ML/MIN/1.73M2 (ref 60–?)
GFR SERPLBLD BASED ON 1.73 SQ M-ARVRAT: 60 ML/MIN/1.73M2 (ref 60–?)
GLOBULIN PLAS-MCNC: 2.7 G/DL (ref 2.8–4.4)
GLOBULIN PLAS-MCNC: 2.9 G/DL (ref 2.8–4.4)
GLUCOSE BLD-MCNC: 139 MG/DL (ref 70–99)
GLUCOSE BLD-MCNC: 91 MG/DL (ref 70–99)
GLUCOSE BLD-MCNC: 93 MG/DL (ref 70–99)
HCT VFR BLD AUTO: 44.5 %
HGB BLD-MCNC: 14.7 G/DL
IMM GRANULOCYTES # BLD AUTO: 0.01 X10(3) UL (ref 0–1)
IMM GRANULOCYTES NFR BLD: 0.1 %
INR BLD: 1.18 (ref 0.85–1.16)
LYMPHOCYTES # BLD AUTO: 0.23 X10(3) UL (ref 1–4)
LYMPHOCYTES NFR BLD AUTO: 3.4 %
MAGNESIUM SERPL-MCNC: 2.1 MG/DL (ref 1.6–2.6)
MCH RBC QN AUTO: 31.7 PG (ref 26–34)
MCHC RBC AUTO-ENTMCNC: 33 G/DL (ref 31–37)
MCV RBC AUTO: 95.9 FL
MONOCYTES # BLD AUTO: 0.27 X10(3) UL (ref 0.1–1)
MONOCYTES NFR BLD AUTO: 4 %
NEUTROPHILS # BLD AUTO: 6.16 X10 (3) UL (ref 1.5–7.7)
NEUTROPHILS # BLD AUTO: 6.16 X10(3) UL (ref 1.5–7.7)
NEUTROPHILS NFR BLD AUTO: 92.1 %
OSMOLALITY SERPL CALC.SUM OF ELEC: 284 MOSM/KG (ref 275–295)
OSMOLALITY SERPL CALC.SUM OF ELEC: 290 MOSM/KG (ref 275–295)
P AXIS: 32 DEGREES
P-R INTERVAL: 178 MS
PLATELET # BLD AUTO: 140 10(3)UL (ref 150–450)
POTASSIUM SERPL-SCNC: 4 MMOL/L (ref 3.5–5.1)
POTASSIUM SERPL-SCNC: 4.1 MMOL/L (ref 3.5–5.1)
PROT SERPL-MCNC: 5.8 G/DL (ref 6.4–8.2)
PROT SERPL-MCNC: 5.8 G/DL (ref 6.4–8.2)
PROTHROMBIN TIME: 15 SECONDS (ref 11.6–14.8)
Q-T INTERVAL: 344 MS
QRS DURATION: 82 MS
QTC CALCULATION (BEZET): 396 MS
R AXIS: -26 DEGREES
RBC # BLD AUTO: 4.64 X10(6)UL
SODIUM SERPL-SCNC: 134 MMOL/L (ref 136–145)
SODIUM SERPL-SCNC: 138 MMOL/L (ref 136–145)
T AXIS: 38 DEGREES
VENTRICULAR RATE: 80 BPM
WBC # BLD AUTO: 6.7 X10(3) UL (ref 4–11)

## 2023-05-07 PROCEDURE — 85025 COMPLETE CBC W/AUTO DIFF WBC: CPT | Performed by: INTERNAL MEDICINE

## 2023-05-07 PROCEDURE — 80053 COMPREHEN METABOLIC PANEL: CPT | Performed by: INTERNAL MEDICINE

## 2023-05-07 PROCEDURE — 83735 ASSAY OF MAGNESIUM: CPT | Performed by: INTERNAL MEDICINE

## 2023-05-07 PROCEDURE — 85610 PROTHROMBIN TIME: CPT | Performed by: INTERNAL MEDICINE

## 2023-05-07 PROCEDURE — 87040 BLOOD CULTURE FOR BACTERIA: CPT | Performed by: HOSPITALIST

## 2023-05-07 PROCEDURE — 70496 CT ANGIOGRAPHY HEAD: CPT | Performed by: HOSPITALIST

## 2023-05-07 PROCEDURE — 70450 CT HEAD/BRAIN W/O DYE: CPT | Performed by: HOSPITALIST

## 2023-05-07 PROCEDURE — 70498 CT ANGIOGRAPHY NECK: CPT | Performed by: HOSPITALIST

## 2023-05-07 PROCEDURE — 82962 GLUCOSE BLOOD TEST: CPT

## 2023-05-07 PROCEDURE — 80143 DRUG ASSAY ACETAMINOPHEN: CPT | Performed by: INTERNAL MEDICINE

## 2023-05-07 PROCEDURE — 82140 ASSAY OF AMMONIA: CPT | Performed by: HOSPITALIST

## 2023-05-07 RX ORDER — SIMETHICONE 80 MG
80 TABLET,CHEWABLE ORAL 4 TIMES DAILY PRN
Status: DISCONTINUED | OUTPATIENT
Start: 2023-05-07 | End: 2023-05-10

## 2023-05-07 NOTE — ED QUICK NOTES
Orders for admission, patient is aware of plan and ready to go upstairs. Any questions, please call ED RN Daniella at extension .      Patient Covid vaccination status: Fully vaccinated     COVID Test Ordered in ED: None    COVID Suspicion at Admission: N/A    Running Infusions:  None    Mental Status/LOC at time of transport: A&Ox3    Other pertinent information:   CIWA score: N/A   NIH score:  N/A

## 2023-05-07 NOTE — PLAN OF CARE
Assumed pt care at 2310. A&O x4. Tele rhythm NSR. SPO2 95% on 2L O2. Breath sounds clear bilaterally. Pt voiding with no issues, urinal at bedside. No c/o chest pain or shortness of breath. Pt c/o lightheadedness, dizziness, and nausea. Pt did not want medication at this time for nausea. Skin dry and intact. Bed is locked and in low position. Call light and personal items within reach. Reviewed plan of care and patient verbalizes understanding. POC: GI to see  Problem: SAFETY ADULT - FALL  Goal: Free from fall injury  Description: INTERVENTIONS:  - Assess pt frequently for physical needs  - Identify cognitive and physical deficits and behaviors that affect risk of falls.   - [place] fall precautions as indicated by assessment.  - Educate pt/family on patient safety including physical limitations  - Instruct pt to call for assistance with activity based on assessment  - Modify environment to reduce risk of injury  - Provide assistive devices as appropriate  - Consider OT/PT consult to assist with strengthening/mobility  - Encourage toileting schedule  Outcome: Progressing     Problem: RESPIRATORY - ADULT  Goal: Achieves optimal ventilation and oxygenation  Description: INTERVENTIONS:  - Assess for changes in respiratory status  - Assess for changes in mentation and behavior  - Position to facilitate oxygenation and minimize respiratory effort  - Oxygen supplementation based on oxygen saturation or ABGs  - Provide Smoking Cessation handout, if applicable  - Encourage broncho-pulmonary hygiene including cough, deep breathe, Incentive Spirometry  - Assess the need for suctioning and perform as needed  - Assess and instruct to report SOB or any respiratory difficulty  - Respiratory Therapy support as indicated  - Manage/alleviate anxiety  - Monitor for signs/symptoms of CO2 retention  Outcome: Progressing     Problem: PAIN - ADULT  Goal: Verbalizes/displays adequate comfort level or patient's stated pain goal  Description: INTERVENTIONS:  - Encourage pt to monitor pain and request assistance  - Assess pain using appropriate pain scale  - Administer analgesics based on type and severity of pain and evaluate response  - Implement non-pharmacological measures as appropriate and evaluate response  - Consider cultural and social influences on pain and pain management  - Manage/alleviate anxiety  - Utilize distraction and/or relaxation techniques  - Monitor for opioid side effects  - Notify MD/LIP if interventions unsuccessful or patient reports new pain  - Anticipate increased pain with activity and pre-medicate as appropriate  Outcome: Progressing     Problem: DISCHARGE PLANNING  Goal: Discharge to home or other facility with appropriate resources  Description: INTERVENTIONS:  - Identify barriers to discharge w/pt and caregiver  - Include patient/family/discharge partner in discharge planning  - Arrange for needed discharge resources and transportation as appropriate  - Identify discharge learning needs (meds, wound care, etc)  - Arrange for interpreters to assist at discharge as needed  - Consider post-discharge preferences of patient/family/discharge partner  - Complete POLST form as appropriate  - Assess patient's ability to be responsible for managing their own health  - Refer to Case Management Department for coordinating discharge planning if the patient needs post-hospital services based on physician/LIP order or complex needs related to functional status, cognitive ability or social support system  Outcome: Progressing

## 2023-05-07 NOTE — PLAN OF CARE
Vitals stable, orthostatics negative. Patient with less to no dizziness or lightheadedness today. Jina Scanlon in shower yesterday at home, doesn't recall much about the timeline of events. LFT's elevated, GI consulted, downtrending now, recheck again at 1600. ADAT patient was on clears this AM, but indigestion, nausea, and abd pain after full liquid lunch. Diet not advanced any further now, MD notified and asked about possible PRN simethicone. Tolerating room air with normal sats. Sat in chair in AM, moves with standby assist.  IV fluids. GI following. Thorough review of plan of care, patient is set on going home and it takes away from his listening ability. Along with his hearing aids being at home. Patient retaining a lot of the plan of care, but will continue to verse patient at each interaction. Wife present most of the day. Son Kristina Zamarripa called and was given thorough update with the OK of the patient. Call light within reach, bed alarm on at all times. All questions answered. Problem: SAFETY ADULT - FALL  Goal: Free from fall injury  Description: INTERVENTIONS:  - Assess pt frequently for physical needs  - Identify cognitive and physical deficits and behaviors that affect risk of falls.   - [place] fall precautions as indicated by assessment.  - Educate pt/family on patient safety including physical limitations  - Instruct pt to call for assistance with activity based on assessment  - Modify environment to reduce risk of injury  - Provide assistive devices as appropriate  - Consider OT/PT consult to assist with strengthening/mobility  - Encourage toileting schedule  Outcome: Progressing     Problem: RESPIRATORY - ADULT  Goal: Achieves optimal ventilation and oxygenation  Description: INTERVENTIONS:  - Assess for changes in respiratory status  - Assess for changes in mentation and behavior  - Position to facilitate oxygenation and minimize respiratory effort  - Oxygen supplementation based on oxygen saturation or ABGs  - Provide Smoking Cessation handout, if applicable  - Encourage broncho-pulmonary hygiene including cough, deep breathe, Incentive Spirometry  - Assess the need for suctioning and perform as needed  - Assess and instruct to report SOB or any respiratory difficulty  - Respiratory Therapy support as indicated  - Manage/alleviate anxiety  - Monitor for signs/symptoms of CO2 retention  Outcome: Progressing     Problem: PAIN - ADULT  Goal: Verbalizes/displays adequate comfort level or patient's stated pain goal  Description: INTERVENTIONS:  - Encourage pt to monitor pain and request assistance  - Assess pain using appropriate pain scale  - Administer analgesics based on type and severity of pain and evaluate response  - Implement non-pharmacological measures as appropriate and evaluate response  - Consider cultural and social influences on pain and pain management  - Manage/alleviate anxiety  - Utilize distraction and/or relaxation techniques  - Monitor for opioid side effects  - Notify MD/LIP if interventions unsuccessful or patient reports new pain  - Anticipate increased pain with activity and pre-medicate as appropriate  Outcome: Progressing     Problem: DISCHARGE PLANNING  Goal: Discharge to home or other facility with appropriate resources  Description: INTERVENTIONS:  - Identify barriers to discharge w/pt and caregiver  - Include patient/family/discharge partner in discharge planning  - Arrange for needed discharge resources and transportation as appropriate  - Identify discharge learning needs (meds, wound care, etc)  - Arrange for interpreters to assist at discharge as needed  - Consider post-discharge preferences of patient/family/discharge partner  - Complete POLST form as appropriate  - Assess patient's ability to be responsible for managing their own health  - Refer to Case Management Department for coordinating discharge planning if the patient needs post-hospital services based on physician/LIP order or complex needs related to functional status, cognitive ability or social support system  Outcome: Progressing

## 2023-05-08 ENCOUNTER — APPOINTMENT (OUTPATIENT)
Dept: MRI IMAGING | Facility: HOSPITAL | Age: 88
End: 2023-05-08
Attending: INTERNAL MEDICINE
Payer: MEDICARE

## 2023-05-08 LAB
ALBUMIN SERPL-MCNC: 2.8 G/DL (ref 3.4–5)
ALBUMIN/GLOB SERPL: 0.8 {RATIO} (ref 1–2)
ALP LIVER SERPL-CCNC: 198 U/L
ALT SERPL-CCNC: 410 U/L
ANION GAP SERPL CALC-SCNC: 3 MMOL/L (ref 0–18)
AST SERPL-CCNC: 412 U/L (ref 15–37)
BASOPHILS # BLD AUTO: 0.01 X10(3) UL (ref 0–0.2)
BASOPHILS NFR BLD AUTO: 0.3 %
BILIRUB SERPL-MCNC: 2.9 MG/DL (ref 0.1–2)
BILIRUB UR QL STRIP.AUTO: NEGATIVE
BUN BLD-MCNC: 17 MG/DL (ref 7–18)
CALCIUM BLD-MCNC: 8.9 MG/DL (ref 8.5–10.1)
CHLORIDE SERPL-SCNC: 110 MMOL/L (ref 98–112)
CLARITY UR REFRACT.AUTO: CLEAR
CO2 SERPL-SCNC: 21 MMOL/L (ref 21–32)
COLOR UR AUTO: YELLOW
CREAT BLD-MCNC: 1.18 MG/DL
EOSINOPHIL # BLD AUTO: 0.02 X10(3) UL (ref 0–0.7)
EOSINOPHIL NFR BLD AUTO: 0.5 %
ERYTHROCYTE [DISTWIDTH] IN BLOOD BY AUTOMATED COUNT: 12.8 %
GFR SERPLBLD BASED ON 1.73 SQ M-ARVRAT: 60 ML/MIN/1.73M2 (ref 60–?)
GLOBULIN PLAS-MCNC: 3.3 G/DL (ref 2.8–4.4)
GLUCOSE BLD-MCNC: 140 MG/DL (ref 70–99)
GLUCOSE UR STRIP.AUTO-MCNC: NEGATIVE MG/DL
HCT VFR BLD AUTO: 43.9 %
HGB BLD-MCNC: 14.8 G/DL
IMM GRANULOCYTES # BLD AUTO: 0.01 X10(3) UL (ref 0–1)
IMM GRANULOCYTES NFR BLD: 0.3 %
KETONES UR STRIP.AUTO-MCNC: NEGATIVE MG/DL
LEUKOCYTE ESTERASE UR QL STRIP.AUTO: NEGATIVE
LYMPHOCYTES # BLD AUTO: 0.32 X10(3) UL (ref 1–4)
LYMPHOCYTES NFR BLD AUTO: 8.3 %
MCH RBC QN AUTO: 31.8 PG (ref 26–34)
MCHC RBC AUTO-ENTMCNC: 33.7 G/DL (ref 31–37)
MCV RBC AUTO: 94.2 FL
MONOCYTES # BLD AUTO: 0.2 X10(3) UL (ref 0.1–1)
MONOCYTES NFR BLD AUTO: 5.2 %
NEUTROPHILS # BLD AUTO: 3.3 X10 (3) UL (ref 1.5–7.7)
NEUTROPHILS # BLD AUTO: 3.3 X10(3) UL (ref 1.5–7.7)
NEUTROPHILS NFR BLD AUTO: 85.4 %
NITRITE UR QL STRIP.AUTO: NEGATIVE
OSMOLALITY SERPL CALC.SUM OF ELEC: 282 MOSM/KG (ref 275–295)
PH UR STRIP.AUTO: 6 [PH] (ref 5–8)
PLATELET # BLD AUTO: 136 10(3)UL (ref 150–450)
POTASSIUM SERPL-SCNC: 3.5 MMOL/L (ref 3.5–5.1)
PROT SERPL-MCNC: 6.1 G/DL (ref 6.4–8.2)
PROT UR STRIP.AUTO-MCNC: NEGATIVE MG/DL
RBC # BLD AUTO: 4.66 X10(6)UL
RBC UR QL AUTO: NEGATIVE
SODIUM SERPL-SCNC: 134 MMOL/L (ref 136–145)
SP GR UR STRIP.AUTO: 1.01 (ref 1–1.03)
UROBILINOGEN UR STRIP.AUTO-MCNC: 4 MG/DL
WBC # BLD AUTO: 3.9 X10(3) UL (ref 4–11)

## 2023-05-08 PROCEDURE — 97535 SELF CARE MNGMENT TRAINING: CPT

## 2023-05-08 PROCEDURE — 76376 3D RENDER W/INTRP POSTPROCES: CPT | Performed by: INTERNAL MEDICINE

## 2023-05-08 PROCEDURE — 97530 THERAPEUTIC ACTIVITIES: CPT

## 2023-05-08 PROCEDURE — 97165 OT EVAL LOW COMPLEX 30 MIN: CPT

## 2023-05-08 PROCEDURE — 81003 URINALYSIS AUTO W/O SCOPE: CPT | Performed by: INTERNAL MEDICINE

## 2023-05-08 PROCEDURE — 85025 COMPLETE CBC W/AUTO DIFF WBC: CPT | Performed by: INTERNAL MEDICINE

## 2023-05-08 PROCEDURE — 80053 COMPREHEN METABOLIC PANEL: CPT | Performed by: INTERNAL MEDICINE

## 2023-05-08 PROCEDURE — 74181 MRI ABDOMEN W/O CONTRAST: CPT | Performed by: INTERNAL MEDICINE

## 2023-05-08 NOTE — PLAN OF CARE
Patient alert and oriented x3-4, forgetful at times. Up with min assist. NSR on tele. Maintaining o2 sats >90% on 2L NC. Voids per urinal. Reddened groin area and scrotum, aloe vesta applied. Denies pain. IVF. Updated patient on POC, verbalized understanding. Safety precautions put in place, bed alarm on. Problem: SAFETY ADULT - FALL  Goal: Free from fall injury  Description: INTERVENTIONS:  - Assess pt frequently for physical needs  - Identify cognitive and physical deficits and behaviors that affect risk of falls.   - [place] fall precautions as indicated by assessment.  - Educate pt/family on patient safety including physical limitations  - Instruct pt to call for assistance with activity based on assessment  - Modify environment to reduce risk of injury  - Provide assistive devices as appropriate  - Consider OT/PT consult to assist with strengthening/mobility  - Encourage toileting schedule  Outcome: Progressing     Problem: RESPIRATORY - ADULT  Goal: Achieves optimal ventilation and oxygenation  Description: INTERVENTIONS:  - Assess for changes in respiratory status  - Assess for changes in mentation and behavior  - Position to facilitate oxygenation and minimize respiratory effort  - Oxygen supplementation based on oxygen saturation or ABGs  - Provide Smoking Cessation handout, if applicable  - Encourage broncho-pulmonary hygiene including cough, deep breathe, Incentive Spirometry  - Assess the need for suctioning and perform as needed  - Assess and instruct to report SOB or any respiratory difficulty  - Respiratory Therapy support as indicated  - Manage/alleviate anxiety  - Monitor for signs/symptoms of CO2 retention  Outcome: Progressing     Problem: PAIN - ADULT  Goal: Verbalizes/displays adequate comfort level or patient's stated pain goal  Description: INTERVENTIONS:  - Encourage pt to monitor pain and request assistance  - Assess pain using appropriate pain scale  - Administer analgesics based on type and severity of pain and evaluate response  - Implement non-pharmacological measures as appropriate and evaluate response  - Consider cultural and social influences on pain and pain management  - Manage/alleviate anxiety  - Utilize distraction and/or relaxation techniques  - Monitor for opioid side effects  - Notify MD/LIP if interventions unsuccessful or patient reports new pain  - Anticipate increased pain with activity and pre-medicate as appropriate  Outcome: Progressing     Problem: DISCHARGE PLANNING  Goal: Discharge to home or other facility with appropriate resources  Description: INTERVENTIONS:  - Identify barriers to discharge w/pt and caregiver  - Include patient/family/discharge partner in discharge planning  - Arrange for needed discharge resources and transportation as appropriate  - Identify discharge learning needs (meds, wound care, etc)  - Arrange for interpreters to assist at discharge as needed  - Consider post-discharge preferences of patient/family/discharge partner  - Complete POLST form as appropriate  - Assess patient's ability to be responsible for managing their own health  - Refer to Case Management Department for coordinating discharge planning if the patient needs post-hospital services based on physician/LIP order or complex needs related to functional status, cognitive ability or social support system  Outcome: Progressing     Problem: Delirium  Goal: Minimize duration of delirium  Description: Interventions:  - Encourage use of hearing aids, eye glasses  - Promote highest level of mobility daily  - Provide frequent reorientation  - Promote wakefulness i.e. lights on, blinds open  - Promote sleep, encourage patient's normal rest cycle i.e. lights off, TV off, minimize noise and interruptions  - Encourage family to assist in orientation and promotion of home routines  Outcome: Progressing

## 2023-05-08 NOTE — PLAN OF CARE
Received patient at 0730. Alert and Oriented this am, clear speech. Tele Rhythm NSR. O2 saturation 96% On 2L NC, weaned to room air and is 93%. Breath sounds clear. Bed is locked and in low position. Call light and personal items within reach. No C/O chest pain or shortness of breath. Pt voiding with no issue per urinal.  Pt ambulates with SBA and cane, generalized weakness noted, PT/OT consult. UA sent this am, plan for MRI abd/MRCP. Reviewed plan of care and patient, wife and son verbalize understanding. Problem: SAFETY ADULT - FALL  Goal: Free from fall injury  Description: INTERVENTIONS:  - Assess pt frequently for physical needs  - Identify cognitive and physical deficits and behaviors that affect risk of falls.   - [place] fall precautions as indicated by assessment.  - Educate pt/family on patient safety including physical limitations  - Instruct pt to call for assistance with activity based on assessment  - Modify environment to reduce risk of injury  - Provide assistive devices as appropriate  - Consider OT/PT consult to assist with strengthening/mobility  - Encourage toileting schedule  Outcome: Progressing     Problem: RESPIRATORY - ADULT  Goal: Achieves optimal ventilation and oxygenation  Description: INTERVENTIONS:  - Assess for changes in respiratory status  - Assess for changes in mentation and behavior  - Position to facilitate oxygenation and minimize respiratory effort  - Oxygen supplementation based on oxygen saturation or ABGs  - Provide Smoking Cessation handout, if applicable  - Encourage broncho-pulmonary hygiene including cough, deep breathe, Incentive Spirometry  - Assess the need for suctioning and perform as needed  - Assess and instruct to report SOB or any respiratory difficulty  - Respiratory Therapy support as indicated  - Manage/alleviate anxiety  - Monitor for signs/symptoms of CO2 retention  Outcome: Progressing     Problem: PAIN - ADULT  Goal: Verbalizes/displays adequate comfort level or patient's stated pain goal  Description: INTERVENTIONS:  - Encourage pt to monitor pain and request assistance  - Assess pain using appropriate pain scale  - Administer analgesics based on type and severity of pain and evaluate response  - Implement non-pharmacological measures as appropriate and evaluate response  - Consider cultural and social influences on pain and pain management  - Manage/alleviate anxiety  - Utilize distraction and/or relaxation techniques  - Monitor for opioid side effects  - Notify MD/LIP if interventions unsuccessful or patient reports new pain  - Anticipate increased pain with activity and pre-medicate as appropriate  Outcome: Progressing     Problem: DISCHARGE PLANNING  Goal: Discharge to home or other facility with appropriate resources  Description: INTERVENTIONS:  - Identify barriers to discharge w/pt and caregiver  - Include patient/family/discharge partner in discharge planning  - Arrange for needed discharge resources and transportation as appropriate  - Identify discharge learning needs (meds, wound care, etc)  - Arrange for interpreters to assist at discharge as needed  - Consider post-discharge preferences of patient/family/discharge partner  - Complete POLST form as appropriate  - Assess patient's ability to be responsible for managing their own health  - Refer to Case Management Department for coordinating discharge planning if the patient needs post-hospital services based on physician/LIP order or complex needs related to functional status, cognitive ability or social support system  Outcome: Progressing     Problem: Delirium  Goal: Minimize duration of delirium  Description: Interventions:  - Encourage use of hearing aids, eye glasses  - Promote highest level of mobility daily  - Provide frequent reorientation  - Promote wakefulness i.e. lights on, blinds open  - Promote sleep, encourage patient's normal rest cycle i.e. lights off, TV off, minimize noise and interruptions  - Encourage family to assist in orientation and promotion of home routines  Outcome: Progressing     Problem: Patient/Family Goals  Goal: Patient/Family Long Term Goal  Description: Patient's Long Term Goal: to go home    Interventions:  - labs, tele monitoring, consults, increase activity, IVF    - See additional Care Plan goals for specific interventions  Outcome: Progressing  Goal: Patient/Family Short Term Goal  Description: Patient's Short Term Goal: return to baseline    Interventions:   - - labs, tele monitoring, consults, increase activity, IVF    - See additional Care Plan goals for specific interventions  Outcome: Progressing

## 2023-05-08 NOTE — PROGRESS NOTES
RRT at [phone] for new onset confusion, shivering, lethargy, unable to follow commands, unable to keep eyes open, high respiratory rate. Vitals stable, glucose WNL. Stroke alert called during RRT. CT and CTA of head negative, no new orders. Placed on 2L NC for high RR. Neuro symptoms mostly resolved upon return to the room. NIHSS of 1 performed by stroke coordinator. Patient however is still slightly more compulsive and confused than pre-RRT when he was normal.  No fever throughout despite shivering. Respiratory rate normal upon return to the room, left on 2L NC for sats of 86% when briefly trailed back on room air. Family present and updated. They wanted to complete DNR paperwork to match his living will which we did. MD paged re: DNR paperwork update, they ordered DNR/select to reflect this.   Thorough report given to [facility] shift nurse, handoff done in the room, patient transferred back to bed from chair with standby assist.

## 2023-05-08 NOTE — SIGNIFICANT EVENT
Called to [phone] at 1825 to assess for stroke. Stroke alert called at [facility] [facility]. Upon arrival found patient lethargic, difficult to arouse, not following commands, difficult to assess extremities but appears to be moving all of them equally, speech garbled. Last Known Normal at 1700. Accompanied patient to CT scanner at [facility] 1. Discussed case with Lucius neurologist on call, at [phone] . Discussed case bedside RN, patient, and family. Per Dr. Lavone Lundborg patient is not a candidate for TNK or NI lab d/t non disabling symptoms. Discussed completion of swallow evaluation prior to taking PO with primary RN. Initial NIHSS upon arrival:     1a. 1  1b. 1 (cannot state month)  1c. 0  2. 0  3. 0  4. 0  5l. 0  5r. 0  6l. 0  6r. 0  7. 0  8. 0  9. 0  10. 1 (speech garbled)  11. 0    Bedside RN reports that at 1700 he was giving patient his scheduled meds, patient was awake and alert, conversant at that time. Around [phone] patient found with a significant change in mental status, sleeping, difficult to arouse, wakes up to verbal and tactile stimuli but he won't stay awake to answer questions. Not following commands and speech garbled. RRT called. Dr. Bryan Lopez at bedside requests stroke alert. Patient sent for stat CT/CTA. While patient in CT scanner symptoms began to resolve. Once back in the room patient is more awake, following commands, answering all questions appropriately, no slurred speech noted. He is still mildly confused. Per radiologist CT and CTA are both negative.

## 2023-05-08 NOTE — PROGRESS NOTES
RRT called for AMS    Hemodynamics noted  Confused  Patient responds to voice, intermittently follows commands, moving all extremities    Plan:  Code Stroke  Imaging ordered  Check ammonia and blood cultures  Discussed with RN and Dr. Kelly Esquivel MD

## 2023-05-09 ENCOUNTER — ANESTHESIA (OUTPATIENT)
Dept: ENDOSCOPY | Facility: HOSPITAL | Age: 88
End: 2023-05-09
Payer: MEDICARE

## 2023-05-09 ENCOUNTER — ANESTHESIA EVENT (OUTPATIENT)
Dept: ENDOSCOPY | Facility: HOSPITAL | Age: 88
End: 2023-05-09
Payer: MEDICARE

## 2023-05-09 LAB
ALBUMIN SERPL-MCNC: 2.6 G/DL (ref 3.4–5)
ALBUMIN SERPL-MCNC: 2.8 G/DL (ref 3.4–5)
ALBUMIN/GLOB SERPL: 0.8 {RATIO} (ref 1–2)
ALP LIVER SERPL-CCNC: 237 U/L
ALP LIVER SERPL-CCNC: 311 U/L
ALT SERPL-CCNC: 371 U/L
ALT SERPL-CCNC: 379 U/L
ANION GAP SERPL CALC-SCNC: 3 MMOL/L (ref 0–18)
AST SERPL-CCNC: 327 U/L (ref 15–37)
AST SERPL-CCNC: 363 U/L (ref 15–37)
BASOPHILS # BLD AUTO: 0.02 X10(3) UL (ref 0–0.2)
BASOPHILS NFR BLD AUTO: 0.6 %
BILIRUB DIRECT SERPL-MCNC: 2 MG/DL (ref 0–0.2)
BILIRUB DIRECT SERPL-MCNC: 2.3 MG/DL (ref 0–0.2)
BILIRUB SERPL-MCNC: 2.4 MG/DL (ref 0.1–2)
BILIRUB SERPL-MCNC: 2.8 MG/DL (ref 0.1–2)
BUN BLD-MCNC: 15 MG/DL (ref 7–18)
CALCIUM BLD-MCNC: 8.6 MG/DL (ref 8.5–10.1)
CHLORIDE SERPL-SCNC: 113 MMOL/L (ref 98–112)
CO2 SERPL-SCNC: 21 MMOL/L (ref 21–32)
CREAT BLD-MCNC: 1.01 MG/DL
EOSINOPHIL # BLD AUTO: 0.07 X10(3) UL (ref 0–0.7)
EOSINOPHIL NFR BLD AUTO: 2 %
ERYTHROCYTE [DISTWIDTH] IN BLOOD BY AUTOMATED COUNT: 12.8 %
GFR SERPLBLD BASED ON 1.73 SQ M-ARVRAT: 72 ML/MIN/1.73M2 (ref 60–?)
GLOBULIN PLAS-MCNC: 3.2 G/DL (ref 2.8–4.4)
GLUCOSE BLD-MCNC: 89 MG/DL (ref 70–99)
HAV IGM SER QL: NONREACTIVE
HBV CORE IGM SER QL: REACTIVE
HBV SURFACE AG SERPL QL IA: NONREACTIVE
HCT VFR BLD AUTO: 43.2 %
HCV AB SERPL QL IA: NONREACTIVE
HGB BLD-MCNC: 14.3 G/DL
IMM GRANULOCYTES # BLD AUTO: 0.01 X10(3) UL (ref 0–1)
IMM GRANULOCYTES NFR BLD: 0.3 %
LYMPHOCYTES # BLD AUTO: 0.43 X10(3) UL (ref 1–4)
LYMPHOCYTES NFR BLD AUTO: 12.1 %
MCH RBC QN AUTO: 31.7 PG (ref 26–34)
MCHC RBC AUTO-ENTMCNC: 33.1 G/DL (ref 31–37)
MCV RBC AUTO: 95.8 FL
MONOCYTES # BLD AUTO: 0.36 X10(3) UL (ref 0.1–1)
MONOCYTES NFR BLD AUTO: 10.1 %
NEUTROPHILS # BLD AUTO: 2.67 X10 (3) UL (ref 1.5–7.7)
NEUTROPHILS # BLD AUTO: 2.67 X10(3) UL (ref 1.5–7.7)
NEUTROPHILS NFR BLD AUTO: 74.9 %
OSMOLALITY SERPL CALC.SUM OF ELEC: 284 MOSM/KG (ref 275–295)
PLATELET # BLD AUTO: 143 10(3)UL (ref 150–450)
POTASSIUM SERPL-SCNC: 3.7 MMOL/L (ref 3.5–5.1)
PROT SERPL-MCNC: 5.8 G/DL (ref 6.4–8.2)
PROT SERPL-MCNC: 6.4 G/DL (ref 6.4–8.2)
RBC # BLD AUTO: 4.51 X10(6)UL
SODIUM SERPL-SCNC: 137 MMOL/L (ref 136–145)
WBC # BLD AUTO: 3.6 X10(3) UL (ref 4–11)

## 2023-05-09 PROCEDURE — 82248 BILIRUBIN DIRECT: CPT | Performed by: INTERNAL MEDICINE

## 2023-05-09 PROCEDURE — 85025 COMPLETE CBC W/AUTO DIFF WBC: CPT | Performed by: INTERNAL MEDICINE

## 2023-05-09 PROCEDURE — 86038 ANTINUCLEAR ANTIBODIES: CPT | Performed by: INTERNAL MEDICINE

## 2023-05-09 PROCEDURE — 97161 PT EVAL LOW COMPLEX 20 MIN: CPT

## 2023-05-09 PROCEDURE — 80074 ACUTE HEPATITIS PANEL: CPT | Performed by: INTERNAL MEDICINE

## 2023-05-09 PROCEDURE — 80053 COMPREHEN METABOLIC PANEL: CPT | Performed by: INTERNAL MEDICINE

## 2023-05-09 PROCEDURE — 83516 IMMUNOASSAY NONANTIBODY: CPT | Performed by: INTERNAL MEDICINE

## 2023-05-09 PROCEDURE — 86225 DNA ANTIBODY NATIVE: CPT | Performed by: INTERNAL MEDICINE

## 2023-05-09 PROCEDURE — 86706 HEP B SURFACE ANTIBODY: CPT | Performed by: INTERNAL MEDICINE

## 2023-05-09 PROCEDURE — 97116 GAIT TRAINING THERAPY: CPT

## 2023-05-09 PROCEDURE — 0FJ44ZZ INSPECTION OF GALLBLADDER, PERCUTANEOUS ENDOSCOPIC APPROACH: ICD-10-PCS | Performed by: INTERNAL MEDICINE

## 2023-05-09 RX ORDER — LIDOCAINE HYDROCHLORIDE 10 MG/ML
INJECTION, SOLUTION EPIDURAL; INFILTRATION; INTRACAUDAL; PERINEURAL AS NEEDED
Status: DISCONTINUED | OUTPATIENT
Start: 2023-05-09 | End: 2023-05-09 | Stop reason: SURG

## 2023-05-09 RX ORDER — PRAMIPEXOLE DIHYDROCHLORIDE 0.25 MG/1
0.5 TABLET ORAL
Status: DISCONTINUED | OUTPATIENT
Start: 2023-05-09 | End: 2023-05-10

## 2023-05-09 RX ORDER — ONDANSETRON 2 MG/ML
4 INJECTION INTRAMUSCULAR; INTRAVENOUS AS NEEDED
Status: DISCONTINUED | OUTPATIENT
Start: 2023-05-09 | End: 2023-05-09

## 2023-05-09 RX ORDER — SODIUM CHLORIDE, SODIUM LACTATE, POTASSIUM CHLORIDE, CALCIUM CHLORIDE 600; 310; 30; 20 MG/100ML; MG/100ML; MG/100ML; MG/100ML
INJECTION, SOLUTION INTRAVENOUS CONTINUOUS PRN
Status: DISCONTINUED | OUTPATIENT
Start: 2023-05-09 | End: 2023-05-09 | Stop reason: SURG

## 2023-05-09 RX ORDER — NALOXONE HYDROCHLORIDE 0.4 MG/ML
80 INJECTION, SOLUTION INTRAMUSCULAR; INTRAVENOUS; SUBCUTANEOUS AS NEEDED
Status: DISCONTINUED | OUTPATIENT
Start: 2023-05-09 | End: 2023-05-09

## 2023-05-09 RX ORDER — SODIUM CHLORIDE, SODIUM LACTATE, POTASSIUM CHLORIDE, CALCIUM CHLORIDE 600; 310; 30; 20 MG/100ML; MG/100ML; MG/100ML; MG/100ML
INJECTION, SOLUTION INTRAVENOUS CONTINUOUS
Status: DISCONTINUED | OUTPATIENT
Start: 2023-05-09 | End: 2023-05-10

## 2023-05-09 RX ADMIN — SODIUM CHLORIDE, SODIUM LACTATE, POTASSIUM CHLORIDE, CALCIUM CHLORIDE: 600; 310; 30; 20 INJECTION, SOLUTION INTRAVENOUS at 17:29:00

## 2023-05-09 RX ADMIN — LIDOCAINE HYDROCHLORIDE 30 MG: 10 INJECTION, SOLUTION EPIDURAL; INFILTRATION; INTRACAUDAL; PERINEURAL at 17:34:00

## 2023-05-09 NOTE — ANESTHESIA POSTPROCEDURE EVALUATION
[address] [facility] Patient Status:  Inpatient   Age/Gender 81year old male MRN [id number]   Location [address] [place] [address] Attending Lise Ivy, [address] [facility] Se Day # 1 PCP Wil Shin MD       Anesthesia Post-op Note    ENDOSCOPIC ULTRASOUND (EUS)     Procedure Summary     Date: 05/09/23 Room / Location: [facility] ENDOSCOPY 01 / 140[address] [facility] ENDOSCOPY    Anesthesia Start: 1729 Anesthesia Stop: 1133    Procedure: ENDOSCOPIC ULTRASOUND (EUS) Diagnosis: (small amount of gall bladder sludge)    Surgeons: Derik Toledo MD Anesthesiologist: Doug Waggoner MD    Anesthesia Type: MAC ASA Status: 3          Anesthesia Type: MAC    Vitals Value Taken Time   /67 05/09/23 1747   Temp  05/09/23 1747   Pulse 63 05/09/23 1747   Resp 16 05/09/23 1747   SpO2 94 % 05/09/23 1747   Vitals shown include unvalidated device data.     Patient Location: Endoscopy    Anesthesia Type: MAC    Airway Patency: patent    Postop Pain Control: adequate    Mental Status: mildly sedated but able to meaningfully participate in the post-anesthesia evaluation    Nausea/Vomiting: none    Cardiopulmonary/Hydration status: stable euvolemic    Complications: no apparent anesthesia related complications    Postop vital signs: stable    Dental Exam: Unchanged from Postop

## 2023-05-09 NOTE — PLAN OF CARE
Received patient at 0730. Alert and Oriented, forgetful at times. Tele Rhythm NSR. O2 saturation 96% On room air. Breath sounds clear. Bed is locked and in low position. Call light and personal items within reach. No C/O chest pain or shortness of breath. Pt voiding with no issue per urinal.  Pt tolerating ambulation well with SBA. Plans per Dr Sandy Marc. Pt on IVF. Reviewed plan of care and patient verbalizes understanding. Problem: SAFETY ADULT - FALL  Goal: Free from fall injury  Description: INTERVENTIONS:  - Assess pt frequently for physical needs  - Identify cognitive and physical deficits and behaviors that affect risk of falls.   - [place] fall precautions as indicated by assessment.  - Educate pt/family on patient safety including physical limitations  - Instruct pt to call for assistance with activity based on assessment  - Modify environment to reduce risk of injury  - Provide assistive devices as appropriate  - Consider OT/PT consult to assist with strengthening/mobility  - Encourage toileting schedule  Outcome: Progressing     Problem: RESPIRATORY - ADULT  Goal: Achieves optimal ventilation and oxygenation  Description: INTERVENTIONS:  - Assess for changes in respiratory status  - Assess for changes in mentation and behavior  - Position to facilitate oxygenation and minimize respiratory effort  - Oxygen supplementation based on oxygen saturation or ABGs  - Provide Smoking Cessation handout, if applicable  - Encourage broncho-pulmonary hygiene including cough, deep breathe, Incentive Spirometry  - Assess the need for suctioning and perform as needed  - Assess and instruct to report SOB or any respiratory difficulty  - Respiratory Therapy support as indicated  - Manage/alleviate anxiety  - Monitor for signs/symptoms of CO2 retention  Outcome: Progressing     Problem: PAIN - ADULT  Goal: Verbalizes/displays adequate comfort level or patient's stated pain goal  Description: INTERVENTIONS:  - Encourage pt to monitor pain and request assistance  - Assess pain using appropriate pain scale  - Administer analgesics based on type and severity of pain and evaluate response  - Implement non-pharmacological measures as appropriate and evaluate response  - Consider cultural and social influences on pain and pain management  - Manage/alleviate anxiety  - Utilize distraction and/or relaxation techniques  - Monitor for opioid side effects  - Notify MD/LIP if interventions unsuccessful or patient reports new pain  - Anticipate increased pain with activity and pre-medicate as appropriate  Outcome: Progressing     Problem: DISCHARGE PLANNING  Goal: Discharge to home or other facility with appropriate resources  Description: INTERVENTIONS:  - Identify barriers to discharge w/pt and caregiver  - Include patient/family/discharge partner in discharge planning  - Arrange for needed discharge resources and transportation as appropriate  - Identify discharge learning needs (meds, wound care, etc)  - Arrange for interpreters to assist at discharge as needed  - Consider post-discharge preferences of patient/family/discharge partner  - Complete POLST form as appropriate  - Assess patient's ability to be responsible for managing their own health  - Refer to Case Management Department for coordinating discharge planning if the patient needs post-hospital services based on physician/LIP order or complex needs related to functional status, cognitive ability or social support system  Outcome: Progressing     Problem: Delirium  Goal: Minimize duration of delirium  Description: Interventions:  - Encourage use of hearing aids, eye glasses  - Promote highest level of mobility daily  - Provide frequent reorientation  - Promote wakefulness i.e. lights on, blinds open  - Promote sleep, encourage patient's normal rest cycle i.e. lights off, TV off, minimize noise and interruptions  - Encourage family to assist in orientation and promotion of home routines  Outcome: Progressing     Problem: Patient/Family Goals  Goal: Patient/Family Long Term Goal  Description: Patient's Long Term Goal: to go home    Interventions:  - labs, tele monitoring, consults, increase activity, IVF    - See additional Care Plan goals for specific interventions  Outcome: Progressing  Goal: Patient/Family Short Term Goal  Description: Patient's Short Term Goal: return to baseline    Interventions:   - - labs, tele monitoring, consults, increase activity, IVF    - See additional Care Plan goals for specific interventions  Outcome: Progressing

## 2023-05-09 NOTE — OPERATIVE REPORT
OPERATIVE REPORT   PATIENT NAME: Ferny Galindo  MRN: [id number]  DATE OF OPERATION:   5/9/2023  PREOPERATIVE DIAGNOSIS:   1. Abdominal pain, elevated liver enzymes, rule out choledocholithiasis  POSTOPERATIVE DIAGNOSES:  1. Very small amount of sludge in the gallbladder  2. Normal-appearing bile duct without obvious stones or obstructing lesion  PROCEDURE PERFORMED:  1. Linear and scopic ultrasound  SURGEON: Karl Mcgee MD   MEDICATIONS: None    ANESTHESIA: MAC  CONSENT: Informed and obtained from the patient  SPECIMEN: None  COMPLICATIONS: None immediately apparent  PROCEDURE AND FINDINGS:   After achieving adequate sedation and placing the patient in the left lateral decubitus position the Olympus linear echoendoscope was introduced under direct visualization in the esophagus passed into the stomach and second portion of the duodenum. The gallbladder was visualized out obvious stones. Very small amount of sludge was seen floating in the bile duct. The bile duct was nondilated measuring approximately 5.4 mm maximum diameter without obvious stones or sludge. The major papilla appeared unremarkable. The head of the pancreas was unremarkable. Small amount of ascites was noted. IMPRESSION:  1. As above  RECOMMENDATIONS:  1. Follow liver enzymes. 2. Further management as per , the findings were discussed with him.   Karl Mcgee MD

## 2023-05-09 NOTE — PROGRESS NOTES
Spoke w/ Dr Lizeth Burton early today about his care    eus w/ Dr Willow Lynch (-) for cbd stone, sludge in gb    Reviewed results w/ wife    --will plan hida cck tomorrow  --check acute hep panel, jeniffer, actin, m2  --lfts in am  --previously discussed role of liver bx w/ pt/wife/son this morning as well if cont to worsen  --[place]po [place] hida tomorrow    Susanne Hashimoto, MD  [address] [place] [address] Gastroenterology

## 2023-05-09 NOTE — PLAN OF CARE
Assumed care at 1. Pt is A&Ox4. Pt is on RA with , sats maintaining >90%. NSR on tele, VSS. No complaints of cardiac symptoms. Continent of B&B. Denies pain at this time. Up w/ SBA and walker, tolerating well. Plan of care reviewed with patient, verbalizes understanding, all needs addressed at this time, pt seems to be resting comfortably. Problem: SAFETY ADULT - FALL  Goal: Free from fall injury  Description: INTERVENTIONS:  - Assess pt frequently for physical needs  - Identify cognitive and physical deficits and behaviors that affect risk of falls.   - [place] fall precautions as indicated by assessment.  - Educate pt/family on patient safety including physical limitations  - Instruct pt to call for assistance with activity based on assessment  - Modify environment to reduce risk of injury  - Provide assistive devices as appropriate  - Consider OT/PT consult to assist with strengthening/mobility  - Encourage toileting schedule  Outcome: Progressing     Problem: RESPIRATORY - ADULT  Goal: Achieves optimal ventilation and oxygenation  Description: INTERVENTIONS:  - Assess for changes in respiratory status  - Assess for changes in mentation and behavior  - Position to facilitate oxygenation and minimize respiratory effort  - Oxygen supplementation based on oxygen saturation or ABGs  - Provide Smoking Cessation handout, if applicable  - Encourage broncho-pulmonary hygiene including cough, deep breathe, Incentive Spirometry  - Assess the need for suctioning and perform as needed  - Assess and instruct to report SOB or any respiratory difficulty  - Respiratory Therapy support as indicated  - Manage/alleviate anxiety  - Monitor for signs/symptoms of CO2 retention  Outcome: Progressing     Problem: PAIN - ADULT  Goal: Verbalizes/displays adequate comfort level or patient's stated pain goal  Description: INTERVENTIONS:  - Encourage pt to monitor pain and request assistance  - Assess pain using appropriate pain scale  - Administer analgesics based on type and severity of pain and evaluate response  - Implement non-pharmacological measures as appropriate and evaluate response  - Consider cultural and social influences on pain and pain management  - Manage/alleviate anxiety  - Utilize distraction and/or relaxation techniques  - Monitor for opioid side effects  - Notify MD/LIP if interventions unsuccessful or patient reports new pain  - Anticipate increased pain with activity and pre-medicate as appropriate  Outcome: Progressing     Problem: DISCHARGE PLANNING  Goal: Discharge to home or other facility with appropriate resources  Description: INTERVENTIONS:  - Identify barriers to discharge w/pt and caregiver  - Include patient/family/discharge partner in discharge planning  - Arrange for needed discharge resources and transportation as appropriate  - Identify discharge learning needs (meds, wound care, etc)  - Arrange for interpreters to assist at discharge as needed  - Consider post-discharge preferences of patient/family/discharge partner  - Complete POLST form as appropriate  - Assess patient's ability to be responsible for managing their own health  - Refer to Case Management Department for coordinating discharge planning if the patient needs post-hospital services based on physician/LIP order or complex needs related to functional status, cognitive ability or social support system  Outcome: Progressing     Problem: Delirium  Goal: Minimize duration of delirium  Description: Interventions:  - Encourage use of hearing aids, eye glasses  - Promote highest level of mobility daily  - Provide frequent reorientation  - Promote wakefulness i.e. lights on, blinds open  - Promote sleep, encourage patient's normal rest cycle i.e. lights off, TV off, minimize noise and interruptions  - Encourage family to assist in orientation and promotion of home routines  Outcome: Progressing     Problem: Patient/Family Goals  Goal: Patient/Family Long Term Goal  Description: Patient's Long Term Goal: to go home    Interventions:  - labs, tele monitoring, consults, increase activity, IVF    - See additional Care Plan goals for specific interventions  Outcome: Progressing  Goal: Patient/Family Short Term Goal  Description: Patient's Short Term Goal: return to baseline    Interventions:   - - labs, tele monitoring, consults, increase activity, IVF    - See additional Care Plan goals for specific interventions  Outcome: Progressing

## 2023-05-10 ENCOUNTER — APPOINTMENT (OUTPATIENT)
Dept: NUCLEAR MEDICINE | Facility: HOSPITAL | Age: 88
End: 2023-05-10
Attending: INTERNAL MEDICINE
Payer: MEDICARE

## 2023-05-10 VITALS
OXYGEN SATURATION: 96 % | RESPIRATION RATE: 20 BRPM | HEART RATE: 68 BPM | TEMPERATURE: 98 F | WEIGHT: 180 LBS | SYSTOLIC BLOOD PRESSURE: 162 MMHG | DIASTOLIC BLOOD PRESSURE: 70 MMHG | BODY MASS INDEX: 25.77 KG/M2 | HEIGHT: 70 IN

## 2023-05-10 LAB
ACTIN SMOOTH MUSCLE AB: 8 UNITS
ALBUMIN SERPL-MCNC: 2.5 G/DL (ref 3.4–5)
ALBUMIN/GLOB SERPL: 0.8 {RATIO} (ref 1–2)
ALP LIVER SERPL-CCNC: 296 U/L
ALT SERPL-CCNC: 330 U/L
ANION GAP SERPL CALC-SCNC: 2 MMOL/L (ref 0–18)
AST SERPL-CCNC: 288 U/L (ref 15–37)
BASOPHILS # BLD AUTO: 0.02 X10(3) UL (ref 0–0.2)
BASOPHILS NFR BLD AUTO: 0.5 %
BILIRUB DIRECT SERPL-MCNC: 1.6 MG/DL (ref 0–0.2)
BILIRUB SERPL-MCNC: 2.1 MG/DL (ref 0.1–2)
BUN BLD-MCNC: 15 MG/DL (ref 7–18)
CALCIUM BLD-MCNC: 8.5 MG/DL (ref 8.5–10.1)
CHLORIDE SERPL-SCNC: 113 MMOL/L (ref 98–112)
CO2 SERPL-SCNC: 22 MMOL/L (ref 21–32)
CREAT BLD-MCNC: 1.02 MG/DL
DSDNA IGG SERPL IA-ACNC: <0.6 IU/ML
ENA AB SER QL IA: <0.09 UG/L
ENA AB SER QL IA: NEGATIVE
EOSINOPHIL # BLD AUTO: 0.13 X10(3) UL (ref 0–0.7)
EOSINOPHIL NFR BLD AUTO: 3 %
ERYTHROCYTE [DISTWIDTH] IN BLOOD BY AUTOMATED COUNT: 13 %
GFR SERPLBLD BASED ON 1.73 SQ M-ARVRAT: 71 ML/MIN/1.73M2 (ref 60–?)
GLOBULIN PLAS-MCNC: 3.3 G/DL (ref 2.8–4.4)
GLUCOSE BLD-MCNC: 114 MG/DL (ref 70–99)
HBV CORE AB SERPL QL IA: NONREACTIVE
HBV SURFACE AB SER QL: NONREACTIVE
HBV SURFACE AB SERPL IA-ACNC: <3.1 MIU/ML
HCT VFR BLD AUTO: 43.4 %
HGB BLD-MCNC: 14.4 G/DL
IMM GRANULOCYTES # BLD AUTO: 0.02 X10(3) UL (ref 0–1)
IMM GRANULOCYTES NFR BLD: 0.5 %
LYMPHOCYTES # BLD AUTO: 0.52 X10(3) UL (ref 1–4)
LYMPHOCYTES NFR BLD AUTO: 12.1 %
M2 MITOCHONDRIAL AB: <20 UNITS
MCH RBC QN AUTO: 31.2 PG (ref 26–34)
MCHC RBC AUTO-ENTMCNC: 33.2 G/DL (ref 31–37)
MCV RBC AUTO: 93.9 FL
MONOCYTES # BLD AUTO: 0.4 X10(3) UL (ref 0.1–1)
MONOCYTES NFR BLD AUTO: 9.3 %
NEUTROPHILS # BLD AUTO: 3.22 X10 (3) UL (ref 1.5–7.7)
NEUTROPHILS # BLD AUTO: 3.22 X10(3) UL (ref 1.5–7.7)
NEUTROPHILS NFR BLD AUTO: 74.6 %
OSMOLALITY SERPL CALC.SUM OF ELEC: 286 MOSM/KG (ref 275–295)
PLATELET # BLD AUTO: 151 10(3)UL (ref 150–450)
POTASSIUM SERPL-SCNC: 3.7 MMOL/L (ref 3.5–5.1)
PROT SERPL-MCNC: 5.8 G/DL (ref 6.4–8.2)
RBC # BLD AUTO: 4.62 X10(6)UL
SODIUM SERPL-SCNC: 137 MMOL/L (ref 136–145)
WBC # BLD AUTO: 4.3 X10(3) UL (ref 4–11)

## 2023-05-10 PROCEDURE — 86704 HEP B CORE ANTIBODY TOTAL: CPT | Performed by: INTERNAL MEDICINE

## 2023-05-10 PROCEDURE — 82248 BILIRUBIN DIRECT: CPT | Performed by: INTERNAL MEDICINE

## 2023-05-10 PROCEDURE — 85025 COMPLETE CBC W/AUTO DIFF WBC: CPT | Performed by: INTERNAL MEDICINE

## 2023-05-10 PROCEDURE — 87517 HEPATITIS B DNA QUANT: CPT | Performed by: INTERNAL MEDICINE

## 2023-05-10 PROCEDURE — 80053 COMPREHEN METABOLIC PANEL: CPT | Performed by: INTERNAL MEDICINE

## 2023-05-10 PROCEDURE — 78227 HEPATOBIL SYST IMAGE W/DRUG: CPT | Performed by: INTERNAL MEDICINE

## 2023-05-10 RX ORDER — PRAMIPEXOLE DIHYDROCHLORIDE 0.25 MG/1
0.25 TABLET ORAL ONCE
Status: COMPLETED | OUTPATIENT
Start: 2023-05-10 | End: 2023-05-10

## 2023-05-10 RX ORDER — MORPHINE SULFATE 2 MG/ML
INJECTION, SOLUTION INTRAMUSCULAR; INTRAVENOUS
Status: DISCONTINUED
Start: 2023-05-10 | End: 2023-05-10

## 2023-05-10 RX ORDER — MORPHINE SULFATE 4 MG/ML
3.2 INJECTION, SOLUTION INTRAMUSCULAR; INTRAVENOUS ONCE
Status: COMPLETED | OUTPATIENT
Start: 2023-05-10 | End: 2023-05-10

## 2023-05-10 NOTE — PLAN OF CARE
Assumed care at [address]. Pt is A&Ox4. Pt is on RA with , sats maintaining >90%. NSR on tele, VSS. No complaints of cardiac symptoms. Continent of B&B. Denies pain at this time. Up w/ SBA and walker, tolerating well. Plan of care reviewed with patient, verbalizes understanding, all needs addressed at this time, pt seems to be resting comfortably. Problem: SAFETY ADULT - FALL  Goal: Free from fall injury  Description: INTERVENTIONS:  - Assess pt frequently for physical needs  - Identify cognitive and physical deficits and behaviors that affect risk of falls.   - [place] fall precautions as indicated by assessment.  - Educate pt/family on patient safety including physical limitations  - Instruct pt to call for assistance with activity based on assessment  - Modify environment to reduce risk of injury  - Provide assistive devices as appropriate  - Consider OT/PT consult to assist with strengthening/mobility  - Encourage toileting schedule  Outcome: Progressing     Problem: PAIN - ADULT  Goal: Verbalizes/displays adequate comfort level or patient's stated pain goal  Description: INTERVENTIONS:  - Encourage pt to monitor pain and request assistance  - Assess pain using appropriate pain scale  - Administer analgesics based on type and severity of pain and evaluate response  - Implement non-pharmacological measures as appropriate and evaluate response  - Consider cultural and social influences on pain and pain management  - Manage/alleviate anxiety  - Utilize distraction and/or relaxation techniques  - Monitor for opioid side effects  - Notify MD/LIP if interventions unsuccessful or patient reports new pain  - Anticipate increased pain with activity and pre-medicate as appropriate  Outcome: Progressing     Problem: DISCHARGE PLANNING  Goal: Discharge to home or other facility with appropriate resources  Description: INTERVENTIONS:  - Identify barriers to discharge w/pt and caregiver  - Include patient/family/discharge partner in discharge planning  - Arrange for needed discharge resources and transportation as appropriate  - Identify discharge learning needs (meds, wound care, etc)  - Arrange for interpreters to assist at discharge as needed  - Consider post-discharge preferences of patient/family/discharge partner  - Complete POLST form as appropriate  - Assess patient's ability to be responsible for managing their own health  - Refer to Case Management Department for coordinating discharge planning if the patient needs post-hospital services based on physician/LIP order or complex needs related to functional status, cognitive ability or social support system  Outcome: Progressing     Problem: Delirium  Goal: Minimize duration of delirium  Description: Interventions:  - Encourage use of hearing aids, eye glasses  - Promote highest level of mobility daily  - Provide frequent reorientation  - Promote wakefulness i.e. lights on, blinds open  - Promote sleep, encourage patient's normal rest cycle i.e. lights off, TV off, minimize noise and interruptions  - Encourage family to assist in orientation and promotion of home routines  Outcome: Progressing     Problem: Patient/Family Goals  Goal: Patient/Family Long Term Goal  Description: Patient's Long Term Goal: to go home    Interventions:  - labs, tele monitoring, consults, increase activity, IVF    - See additional Care Plan goals for specific interventions  Outcome: Progressing  Goal: Patient/Family Short Term Goal  Description: Patient's Short Term Goal: return to baseline    Interventions:   - - labs, tele monitoring, consults, increase activity, IVF    - See additional Care Plan goals for specific interventions  Outcome: Progressing

## 2023-05-10 NOTE — PROGRESS NOTES
NURSING DISCHARGE NOTE      Pt. Discharged home. IV removed, tele dc'd and returned to monitor tech. F/U instructions provided and discussed. Pt. And family verbalized understanding. Rx is given. Discussed adverse reactions and side effects of all new medications and provided appropriate handouts. Pt. And family V/u. Pt. Wheeled down by staff with all belongings. Pt. Left denying c/o pain, malaise, or cardiac symptoms. All needs met by staff.

## 2023-05-10 NOTE — DISCHARGE INSTRUCTIONS
If symptoms worsen or persist , please seek medical attention. If you feel faint, light headed or dizziness, get yourself slowly lying down on nearest chair, couch or ground. Please follow low cholesterol, low fatty diet, and avoid fried food.

## 2023-05-10 NOTE — PROGRESS NOTES
Notified by RN that patient wants to go home. I reviewed his care w/ Dr Boo Herr    I spoke w/ Dr Marylee Cowboy about the hida, hida eventually had gb filling w/ morphine, no cck given though    Spoke w/ pt/wife about hep b core igm    They did not want to wait for repeat cck hida     Pt getting frustrated and wants to go home    Advised he f/u w/ Dr Doroteo Fleischer for monitoring of the lfts and any other eval    Pt denies further sympotms    Risk of recurrence discussed w/ them        Pt/wife demonstrated understanding of risks of morbidity/mortality if diagnoses and/or treatments were delayed balanced against risks of further investigation and/or treatment.      Rowdy Lou MD  [address] [place] [address] Gastroenterology

## 2023-05-10 NOTE — PLAN OF CARE
Denies c/o pain, malaise, or cardiac symptoms. Thiago Clack to go home, threatening to leave AMA regardless of results of HIDA scan. Have thus far convinced him to stay to rule out sever abnormalities and for his safety. Remains in NSR, NL S1, S2, RRR. Lungs clear and diminished on RA. Abdomen soft and non tender to touch with active bowel sounds in all four quadrants. Negative for edema. Tolerating all medications well with no adverse effects. HIDA scan complete and awaiting further POC. All needs met by staff. Will continue to monitor. Problem: SAFETY ADULT - FALL  Goal: Free from fall injury  Description: INTERVENTIONS:  - Assess pt frequently for physical needs  - Identify cognitive and physical deficits and behaviors that affect risk of falls.   - [place] fall precautions as indicated by assessment.  - Educate pt/family on patient safety including physical limitations  - Instruct pt to call for assistance with activity based on assessment  - Modify environment to reduce risk of injury  - Provide assistive devices as appropriate  - Consider OT/PT consult to assist with strengthening/mobility  - Encourage toileting schedule  Outcome: Progressing     Problem: RESPIRATORY - ADULT  Goal: Achieves optimal ventilation and oxygenation  Description: INTERVENTIONS:  - Assess for changes in respiratory status  - Assess for changes in mentation and behavior  - Position to facilitate oxygenation and minimize respiratory effort  - Oxygen supplementation based on oxygen saturation or ABGs  - Provide Smoking Cessation handout, if applicable  - Encourage broncho-pulmonary hygiene including cough, deep breathe, Incentive Spirometry  - Assess the need for suctioning and perform as needed  - Assess and instruct to report SOB or any respiratory difficulty  - Respiratory Therapy support as indicated  - Manage/alleviate anxiety  - Monitor for signs/symptoms of CO2 retention  Outcome: Progressing     Problem: PAIN - ADULT  Goal: Verbalizes/displays adequate comfort level or patient's stated pain goal  Description: INTERVENTIONS:  - Encourage pt to monitor pain and request assistance  - Assess pain using appropriate pain scale  - Administer analgesics based on type and severity of pain and evaluate response  - Implement non-pharmacological measures as appropriate and evaluate response  - Consider cultural and social influences on pain and pain management  - Manage/alleviate anxiety  - Utilize distraction and/or relaxation techniques  - Monitor for opioid side effects  - Notify MD/LIP if interventions unsuccessful or patient reports new pain  - Anticipate increased pain with activity and pre-medicate as appropriate  Outcome: Progressing     Problem: DISCHARGE PLANNING  Goal: Discharge to home or other facility with appropriate resources  Description: INTERVENTIONS:  - Identify barriers to discharge w/pt and caregiver  - Include patient/family/discharge partner in discharge planning  - Arrange for needed discharge resources and transportation as appropriate  - Identify discharge learning needs (meds, wound care, etc)  - Arrange for interpreters to assist at discharge as needed  - Consider post-discharge preferences of patient/family/discharge partner  - Complete POLST form as appropriate  - Assess patient's ability to be responsible for managing their own health  - Refer to Case Management Department for coordinating discharge planning if the patient needs post-hospital services based on physician/LIP order or complex needs related to functional status, cognitive ability or social support system  Outcome: Progressing     Problem: Delirium  Goal: Minimize duration of delirium  Description: Interventions:  - Encourage use of hearing aids, eye glasses  - Promote highest level of mobility daily  - Provide frequent reorientation  - Promote wakefulness i.e. lights on, blinds open  - Promote sleep, encourage patient's normal rest cycle i.e. lights off, TV off, minimize noise and interruptions  - Encourage family to assist in orientation and promotion of home routines  Outcome: Progressing     Problem: Patient/Family Goals  Goal: Patient/Family Long Term Goal  Description: Patient's Long Term Goal: to go home    Interventions:  - labs, tele monitoring, consults, increase activity, IVF    - See additional Care Plan goals for specific interventions  Outcome: Progressing  Goal: Patient/Family Short Term Goal  Description: Patient's Short Term Goal: return to baseline    Interventions:   - - labs, tele monitoring, consults, increase activity, IVF    - See additional Care Plan goals for specific interventions  Outcome: Progressing

## 2023-05-10 NOTE — CM/SW NOTE
SW met with pt at bedside to discuss discharge planning. Pt is alert and oriented x4. Pt hopeful to discharge today. SW discussed that PT is recommending HHC at discharge. Pt declines needing HHC. SW attempted to offer the [place] [address] agency list from [facility] if pt changes his mind, but pt still declining. Pt states that if pt is discharged today, either spouse or son will come  pt. SW cancelled [facility] Referrals    No other dc needs identified at this time.      HUSSAIN Redd, [facility]  Discharge [address] [facility].

## 2023-11-03 NOTE — PLAN OF CARE
Assumed care for this pt at [address] [place] [address] Road. Pt alert oriented up with standby assist. Pt denies any questions, states that iv morphine helps the pain . Bed low call light in reach, bed alarm on. Pt negative neuro assessments.  Will continue to monitor  Pt refused to maximize function  - Promote sitting position while performing ADLs such as feeding, grooming, and bathing  - Educate and encourage patient/family in tolerated functional activity level and precautions during self-care  Outcome: Progressing     Problem: PA Unable to assess at this time secondary to significantly impaired strength, balance, and endurance.

## 2024-09-25 ENCOUNTER — HOSPITAL ENCOUNTER (OUTPATIENT)
Age: 89
Discharge: HOME | End: 2024-09-25
Attending: EMERGENCY MEDICINE
Payer: MEDICARE

## 2024-09-25 VITALS
HEART RATE: 84 BPM | WEIGHT: 180 LBS | OXYGEN SATURATION: 94 % | BODY MASS INDEX: 27.28 KG/M2 | TEMPERATURE: 98 F | HEIGHT: 68 IN | RESPIRATION RATE: 16 BRPM | SYSTOLIC BLOOD PRESSURE: 140 MMHG | DIASTOLIC BLOOD PRESSURE: 66 MMHG

## 2024-09-25 DIAGNOSIS — K21.9 GASTROESOPHAGEAL REFLUX DISEASE, UNSPECIFIED WHETHER ESOPHAGITIS PRESENT: Primary | ICD-10-CM

## 2024-09-25 LAB
#MXD IC: 0.7 X10ˆ3/UL (ref 0.1–1)
BUN BLD-MCNC: 37 MG/DL (ref 7–18)
CHLORIDE BLD-SCNC: 106 MMOL/L (ref 98–112)
CO2 BLD-SCNC: 27 MMOL/L (ref 21–32)
CREAT BLD-MCNC: 1.2 MG/DL
EGFRCR SERPLBLD CKD-EPI 2021: 58 ML/MIN/1.73M2 (ref 60–?)
GLUCOSE BLD-MCNC: 120 MG/DL (ref 70–99)
HCT VFR BLD AUTO: 48.6 %
HCT VFR BLD CALC: 48 %
HGB BLD-MCNC: 15.9 G/DL
ISTAT IONIZED CALCIUM FOR CHEM 8: 1.33 MMOL/L (ref 1.12–1.32)
LYMPHOCYTES # BLD AUTO: 0.9 X10ˆ3/UL (ref 1–4)
LYMPHOCYTES NFR BLD AUTO: 13.1 %
MCH RBC QN AUTO: 32 PG (ref 26–34)
MCHC RBC AUTO-ENTMCNC: 32.7 G/DL (ref 31–37)
MCV RBC AUTO: 97.8 FL (ref 80–100)
MIXED CELL %: 11.1 %
NEUTROPHILS # BLD AUTO: 4.9 X10ˆ3/UL (ref 1.5–7.7)
NEUTROPHILS NFR BLD AUTO: 75.8 %
PLATELET # BLD AUTO: 191 X10ˆ3/UL (ref 150–450)
POTASSIUM BLD-SCNC: 4.5 MMOL/L (ref 3.6–5.1)
RBC # BLD AUTO: 4.97 X10ˆ6/UL
SODIUM BLD-SCNC: 144 MMOL/L (ref 136–145)
TROPONIN I BLD-MCNC: <0.02 NG/ML
WBC # BLD AUTO: 6.5 X10ˆ3/UL (ref 4–11)

## 2024-09-25 PROCEDURE — 84484 ASSAY OF TROPONIN QUANT: CPT

## 2024-09-25 PROCEDURE — 99214 OFFICE O/P EST MOD 30 MIN: CPT

## 2024-09-25 PROCEDURE — 36415 COLL VENOUS BLD VENIPUNCTURE: CPT

## 2024-09-25 PROCEDURE — 93010 ELECTROCARDIOGRAM REPORT: CPT

## 2024-09-25 PROCEDURE — 93005 ELECTROCARDIOGRAM TRACING: CPT

## 2024-09-25 PROCEDURE — 80047 BASIC METABLC PNL IONIZED CA: CPT

## 2024-09-25 PROCEDURE — 85025 COMPLETE CBC W/AUTO DIFF WBC: CPT | Performed by: EMERGENCY MEDICINE

## 2024-09-25 RX ORDER — PANTOPRAZOLE SODIUM 40 MG/1
40 TABLET, DELAYED RELEASE ORAL DAILY
Qty: 14 TABLET | Refills: 0 | Status: SHIPPED | OUTPATIENT
Start: 2024-09-25 | End: 2024-09-25

## 2024-09-25 RX ORDER — MAGNESIUM HYDROXIDE/ALUMINUM HYDROXICE/SIMETHICONE 120; 1200; 1200 MG/30ML; MG/30ML; MG/30ML
30 SUSPENSION ORAL ONCE
Status: COMPLETED | OUTPATIENT
Start: 2024-09-25 | End: 2024-09-25

## 2024-09-25 RX ORDER — DICYCLOMINE HYDROCHLORIDE 10 MG/5ML
20 SOLUTION ORAL ONCE
Status: COMPLETED | OUTPATIENT
Start: 2024-09-25 | End: 2024-09-25

## 2024-09-25 RX ORDER — LIDOCAINE HYDROCHLORIDE 20 MG/ML
10 SOLUTION OROPHARYNGEAL ONCE
Status: COMPLETED | OUTPATIENT
Start: 2024-09-25 | End: 2024-09-25

## 2024-09-25 NOTE — ED INITIAL ASSESSMENT (HPI)
Pt presents with pain from throat down to mid abdomen. Pt reports pain x 2 days. Pt reports, \"anything I eat gives me a burning in my chest\".     Pt took Mylanta with some relief.     Pain does not radiate.

## 2024-09-26 NOTE — ED PROVIDER NOTES
Patient Seen in: [facility]      History     Chief Complaint   Patient presents with    Gastro-esophageal Reflux     Stated Complaint: Heartburn    Subjective:   HPI    89 yo male with burning pain from the upper stomach up into his throat. Worse with eating. Bad taste in his mouth. No shortness of breath. Did get some relief with mylanta. Not short of breath. No recent illness or trauma.     Objective:   Past Medical History:    Coronary atherosclerosis of unspecified type of vessel, native or graft    Esophageal reflux    High blood pressure    High cholesterol    History of TIA (transient ischemic attack)    Hypotension due to drugs    Kidney stone    Mixed hyperlipidemia    Multiple closed fractures of ribs of right side    Nontraumatic complete tear of left rotator cuff    JAMESON (obstructive sleep apnea)    AHI-33, supine AHI-48, lateral AHI-3/CPAP-10cwp    Other and unspecified personal history of malignant neoplasm    prostate    Transient global amnesia    Unspecified essential hypertension    Unspecified transient cerebral ischemia    Vertigo    87 y/o presented with chronic gait disturbance, unsteadiness, dizziness, no vertigo or n/v when asked him in detail. He had almost every two years of brain MRI due to his similar complaints. I have reviewed his brain MRI image with him and his wife which showed no evidence of NPH, but moderate white matter changes.               Past Surgical History:   Procedure Laterality Date    Drug-eluting stents, single  1989    Lumbar discectomy  11/01/2021    L4 L5    Other surgical history  5-1-08    brachytherapy of prostate    Other surgical history  7/30/12    cysto, lt urs, litho, stone extraction of fragments, rpg, lt stent placement- Dr. Braun    Other surgical history  8/3/12    cysto stent removal- Dr. Braun    Other surgical history  1/2/15    Rt hip s/p evacuation of hematoma                Social History     Socioeconomic History    Marital status:     Tobacco Use    Smoking status: Former     Current packs/day: 0.00     Average packs/day: 1 pack/day for 15.0 years (15.0 ttl pk-yrs)     Types: Cigarettes     Start date: 1974     Quit date: 1989     Years since quittin.7    Smokeless tobacco: Former   Vaping Use    Vaping status: Never Used   Substance and Sexual Activity    Alcohol use: Yes     Alcohol/week: 1.0 standard drink of alcohol     Types: 1 Shots of liquor per week    Sexual activity: Yes     Partners: Female   Social History Narrative    : , second marriage    Children: 3 and 2 step    Exercise: walks, yardwork    Employment: retired    Caffeine intake: 1 coffee/d    Son going thru a divorce. Helping him move. Son moving to [place].               Review of Systems    Positive for stated Chief Complaint: Gastro-esophageal Reflux    Other systems are as noted in HPI.  Constitutional and vital signs reviewed.      All other systems reviewed and negative except as noted above.    Physical Exam     ED Triage Vitals [24 1451]   /66   Pulse 84   Resp 16   Temp 98.1 °F (36.7 °C)   Temp src Oral   SpO2 92 %   O2 Device None (Room air)       Current Vitals:   Vital Signs  BP: 140/66  Pulse: 84  Resp: 16  Temp: 98.1 °F (36.7 °C)  Temp src: Oral    Oxygen Therapy  SpO2: 94 %  O2 Device: None (Room air)            Physical Exam  Vitals and nursing note reviewed.   Constitutional:       Appearance: Normal appearance. He is well-developed.   HENT:      Head: Normocephalic and atraumatic.   Cardiovascular:      Rate and Rhythm: Normal rate and regular rhythm.      Heart sounds: Normal heart sounds.   Pulmonary:      Effort: Pulmonary effort is normal. No respiratory distress.      Breath sounds: Normal breath sounds.   Abdominal:      General: There is no distension.      Palpations: Abdomen is soft.      Tenderness: There is no abdominal tenderness. There is no right CVA tenderness or left CVA tenderness.   Skin:     General:  Skin is warm and dry.      Capillary Refill: Capillary refill takes less than 2 seconds.   Neurological:      General: No focal deficit present.      Mental Status: He is alert.      Sensory: No sensory deficit.   Psychiatric:         Mood and Affect: Mood normal.         Behavior: Behavior normal.              ED Course     Labs Reviewed   POCT CBC - Abnormal; Notable for the following components:       Result Value    # Lymphocyte 0.9 (*)     All other components within normal limits   POCT ISTAT CHEM8 CARTRIDGE - Abnormal; Notable for the following components:    ISTAT BUN 37 (*)     ISTAT Ionized Calcium 1.33 (*)     ISTAT Glucose 120 (*)     eGFR-Cr 58 (*)     All other components within normal limits   ISTAT TROPONIN - Normal     EKG    Rate, intervals and axes as noted on EKG Report.  Rate: 77  Rhythm: Sinus Rhythm  Reading: left axis. NO STEMI.                           MDM                                      MDM  GERD, ACS both in differential. EKG reviewed by myself. No STEMI. Trop and CBC normal. Chem: renal insufficiency and mild hyperglycemia. Patient advised to follow up with primary care this week. Most consistent with GERD. Discussed dietary modifications and pantoprazole.     Disposition and Plan     Clinical Impression:  1. Gastroesophageal reflux disease, unspecified whether esophagitis present         Disposition:  Discharge  9/25/2024  4:25 pm    Follow-up:  Mir Benito MD  [address] [place] [address]  [phone]      As needed          Medications Prescribed:  Discharge Medication List as of 9/25/2024  4:32 PM        START taking these medications    Details   !! pantoprazole 40 MG Oral Tab EC Take 1 tablet (40 mg total) by mouth daily for 14 days., Normal, Disp-14 tablet, R-0       !! - Potential duplicate medications found. Please discuss with provider.

## 2024-09-27 LAB
ATRIAL RATE: 77 BPM
P AXIS: 21 DEGREES
P-R INTERVAL: 162 MS
Q-T INTERVAL: 372 MS
QRS DURATION: 82 MS
QTC CALCULATION (BEZET): 420 MS
R AXIS: -41 DEGREES
T AXIS: 62 DEGREES
VENTRICULAR RATE: 77 BPM

## 2025-04-04 ENCOUNTER — HOSPITAL ENCOUNTER (INPATIENT)
Facility: HOSPITAL | Age: 89
LOS: 1 days | Discharge: HOME | End: 2025-04-05
Attending: EMERGENCY MEDICINE | Admitting: INTERNAL MEDICINE
Payer: MEDICARE

## 2025-04-04 ENCOUNTER — APPOINTMENT (OUTPATIENT)
Dept: CT IMAGING | Facility: HOSPITAL | Age: 89
End: 2025-04-04
Attending: EMERGENCY MEDICINE
Payer: MEDICARE

## 2025-04-04 DIAGNOSIS — R41.82 ALTERED MENTAL STATUS, UNSPECIFIED ALTERED MENTAL STATUS TYPE: ICD-10-CM

## 2025-04-04 DIAGNOSIS — E83.52 HYPERCALCEMIA: Primary | ICD-10-CM

## 2025-04-04 DIAGNOSIS — R10.9 ABDOMINAL PAIN OF UNKNOWN ETIOLOGY: ICD-10-CM

## 2025-04-04 PROBLEM — R79.89 AZOTEMIA: Status: ACTIVE | Noted: 2025-04-04

## 2025-04-04 PROBLEM — R73.9 HYPERGLYCEMIA: Status: ACTIVE | Noted: 2025-04-04

## 2025-04-04 LAB
ALBUMIN SERPL-MCNC: 4.9 G/DL (ref 3.2–4.8)
ALBUMIN/GLOB SERPL: 2 {RATIO} (ref 1–2)
ALP LIVER SERPL-CCNC: 69 U/L
ALT SERPL-CCNC: 27 U/L
ANION GAP SERPL CALC-SCNC: 9 MMOL/L (ref 0–18)
AST SERPL-CCNC: 25 U/L (ref ?–34)
ATRIAL RATE: 63 BPM
BASOPHILS # BLD AUTO: 0.03 X10(3) UL (ref 0–0.2)
BASOPHILS NFR BLD AUTO: 0.3 %
BILIRUB SERPL-MCNC: 0.5 MG/DL (ref 0.2–1.1)
BILIRUB UR QL STRIP.AUTO: NEGATIVE
BUN BLD-MCNC: 30 MG/DL (ref 9–23)
CA-I BLD-SCNC: 1.13 MMOL/L (ref 0.95–1.32)
CALCIUM BLD-MCNC: 12.1 MG/DL (ref 8.7–10.6)
CHLORIDE SERPL-SCNC: 106 MMOL/L (ref 98–112)
CLARITY UR REFRACT.AUTO: CLEAR
CO2 SERPL-SCNC: 28 MMOL/L (ref 21–32)
CREAT BLD-MCNC: 1.42 MG/DL
EGFRCR SERPLBLD CKD-EPI 2021: 47 ML/MIN/1.73M2 (ref 60–?)
EOSINOPHIL # BLD AUTO: 0.07 X10(3) UL (ref 0–0.7)
EOSINOPHIL NFR BLD AUTO: 0.8 %
ERYTHROCYTE [DISTWIDTH] IN BLOOD BY AUTOMATED COUNT: 12.8 %
GLOBULIN PLAS-MCNC: 2.4 G/DL (ref 2–3.5)
GLUCOSE BLD-MCNC: 123 MG/DL (ref 70–99)
GLUCOSE UR STRIP.AUTO-MCNC: NORMAL MG/DL
HCT VFR BLD AUTO: 50.1 %
HGB BLD-MCNC: 16.5 G/DL
IMM GRANULOCYTES # BLD AUTO: 0.02 X10(3) UL (ref 0–1)
IMM GRANULOCYTES NFR BLD: 0.2 %
KETONES UR STRIP.AUTO-MCNC: NEGATIVE MG/DL
LEUKOCYTE ESTERASE UR QL STRIP.AUTO: NEGATIVE
LIPASE SERPL-CCNC: 41 U/L (ref 12–53)
LYMPHOCYTES # BLD AUTO: 0.73 X10(3) UL (ref 1–4)
LYMPHOCYTES NFR BLD AUTO: 8 %
MCH RBC QN AUTO: 31.9 PG (ref 26–34)
MCHC RBC AUTO-ENTMCNC: 32.9 G/DL (ref 31–37)
MCV RBC AUTO: 96.7 FL
MONOCYTES # BLD AUTO: 0.85 X10(3) UL (ref 0.1–1)
MONOCYTES NFR BLD AUTO: 9.4 %
NEUTROPHILS # BLD AUTO: 7.37 X10 (3) UL (ref 1.5–7.7)
NEUTROPHILS # BLD AUTO: 7.37 X10(3) UL (ref 1.5–7.7)
NEUTROPHILS NFR BLD AUTO: 81.3 %
NITRITE UR QL STRIP.AUTO: NEGATIVE
OSMOLALITY SERPL CALC.SUM OF ELEC: 304 MOSM/KG (ref 275–295)
P AXIS: 15 DEGREES
P-R INTERVAL: 188 MS
PH UR STRIP.AUTO: 5.5 [PH] (ref 5–8)
PLATELET # BLD AUTO: 201 10(3)UL (ref 150–450)
POTASSIUM SERPL-SCNC: 4.7 MMOL/L (ref 3.5–5.1)
PROT SERPL-MCNC: 7.3 G/DL (ref 5.7–8.2)
PROT UR STRIP.AUTO-MCNC: NEGATIVE MG/DL
Q-T INTERVAL: 380 MS
QRS DURATION: 90 MS
QTC CALCULATION (BEZET): 388 MS
R AXIS: -28 DEGREES
RBC # BLD AUTO: 5.18 X10(6)UL
RBC UR QL AUTO: NEGATIVE
SODIUM SERPL-SCNC: 143 MMOL/L (ref 136–145)
SP GR UR STRIP.AUTO: >1.03 (ref 1–1.03)
T AXIS: 53 DEGREES
UROBILINOGEN UR STRIP.AUTO-MCNC: NORMAL MG/DL
VENTRICULAR RATE: 63 BPM
WBC # BLD AUTO: 9.1 X10(3) UL (ref 4–11)

## 2025-04-04 PROCEDURE — 81003 URINALYSIS AUTO W/O SCOPE: CPT | Performed by: EMERGENCY MEDICINE

## 2025-04-04 PROCEDURE — 83690 ASSAY OF LIPASE: CPT | Performed by: EMERGENCY MEDICINE

## 2025-04-04 PROCEDURE — 96361 HYDRATE IV INFUSION ADD-ON: CPT

## 2025-04-04 PROCEDURE — 93005 ELECTROCARDIOGRAM TRACING: CPT

## 2025-04-04 PROCEDURE — 96374 THER/PROPH/DIAG INJ IV PUSH: CPT

## 2025-04-04 PROCEDURE — 74177 CT ABD & PELVIS W/CONTRAST: CPT | Performed by: EMERGENCY MEDICINE

## 2025-04-04 PROCEDURE — 99285 EMERGENCY DEPT VISIT HI MDM: CPT

## 2025-04-04 PROCEDURE — 93010 ELECTROCARDIOGRAM REPORT: CPT

## 2025-04-04 PROCEDURE — 82330 ASSAY OF CALCIUM: CPT | Performed by: HOSPITALIST

## 2025-04-04 PROCEDURE — 85025 COMPLETE CBC W/AUTO DIFF WBC: CPT | Performed by: EMERGENCY MEDICINE

## 2025-04-04 PROCEDURE — 80053 COMPREHEN METABOLIC PANEL: CPT | Performed by: EMERGENCY MEDICINE

## 2025-04-04 RX ORDER — ROSUVASTATIN CALCIUM 40 MG/1
40 TABLET, COATED ORAL NIGHTLY
COMMUNITY

## 2025-04-04 RX ORDER — SODIUM PHOSPHATE, DIBASIC AND SODIUM PHOSPHATE, MONOBASIC 7; 19 G/230ML; G/230ML
1 ENEMA RECTAL ONCE AS NEEDED
Status: DISCONTINUED | OUTPATIENT
Start: 2025-04-04 | End: 2025-04-05

## 2025-04-04 RX ORDER — SODIUM CHLORIDE 9 MG/ML
1000 INJECTION, SOLUTION INTRAVENOUS ONCE
Status: COMPLETED | OUTPATIENT
Start: 2025-04-04 | End: 2025-04-04

## 2025-04-04 RX ORDER — ONDANSETRON 2 MG/ML
4 INJECTION INTRAMUSCULAR; INTRAVENOUS EVERY 6 HOURS PRN
Status: DISCONTINUED | OUTPATIENT
Start: 2025-04-04 | End: 2025-04-05

## 2025-04-04 RX ORDER — MAGNESIUM HYDROXIDE/ALUMINUM HYDROXICE/SIMETHICONE 120; 1200; 1200 MG/30ML; MG/30ML; MG/30ML
30 SUSPENSION ORAL ONCE
Status: COMPLETED | OUTPATIENT
Start: 2025-04-04 | End: 2025-04-04

## 2025-04-04 RX ORDER — ESOMEPRAZOLE MAGNESIUM 40 MG/1
40 CAPSULE, DELAYED RELEASE ORAL
COMMUNITY
Start: 2025-02-13 | End: 2025-04-05

## 2025-04-04 RX ORDER — SENNOSIDES 8.6 MG
17.2 TABLET ORAL NIGHTLY PRN
Status: DISCONTINUED | OUTPATIENT
Start: 2025-04-04 | End: 2025-04-05

## 2025-04-04 RX ORDER — TRAZODONE HYDROCHLORIDE 50 MG/1
50 TABLET ORAL NIGHTLY
COMMUNITY

## 2025-04-04 RX ORDER — HEPARIN SODIUM 5000 [USP'U]/ML
5000 INJECTION, SOLUTION INTRAVENOUS; SUBCUTANEOUS EVERY 8 HOURS SCHEDULED
Status: DISCONTINUED | OUTPATIENT
Start: 2025-04-04 | End: 2025-04-05

## 2025-04-04 RX ORDER — SODIUM CHLORIDE 9 MG/ML
INJECTION, SOLUTION INTRAVENOUS CONTINUOUS
Status: DISCONTINUED | OUTPATIENT
Start: 2025-04-04 | End: 2025-04-05

## 2025-04-04 RX ORDER — METOCLOPRAMIDE HYDROCHLORIDE 5 MG/ML
5 INJECTION INTRAMUSCULAR; INTRAVENOUS EVERY 8 HOURS PRN
Status: DISCONTINUED | OUTPATIENT
Start: 2025-04-04 | End: 2025-04-05

## 2025-04-04 RX ORDER — ROSUVASTATIN CALCIUM 40 MG/1
40 TABLET, COATED ORAL NIGHTLY
Status: DISCONTINUED | OUTPATIENT
Start: 2025-04-04 | End: 2025-04-05

## 2025-04-04 RX ORDER — BISACODYL 10 MG
10 SUPPOSITORY, RECTAL RECTAL
Status: DISCONTINUED | OUTPATIENT
Start: 2025-04-04 | End: 2025-04-05

## 2025-04-04 RX ORDER — ACETAMINOPHEN 500 MG
500 TABLET ORAL EVERY 4 HOURS PRN
Status: DISCONTINUED | OUTPATIENT
Start: 2025-04-04 | End: 2025-04-05

## 2025-04-04 RX ORDER — POLYETHYLENE GLYCOL 3350 17 G/17G
17 POWDER, FOR SOLUTION ORAL DAILY PRN
Status: DISCONTINUED | OUTPATIENT
Start: 2025-04-04 | End: 2025-04-05

## 2025-04-04 RX ORDER — PRAMIPEXOLE DIHYDROCHLORIDE 0.25 MG/1
0.5 TABLET ORAL NIGHTLY
Status: DISCONTINUED | OUTPATIENT
Start: 2025-04-04 | End: 2025-04-05

## 2025-04-04 RX ORDER — TRAZODONE HYDROCHLORIDE 50 MG/1
50 TABLET ORAL NIGHTLY
Status: DISCONTINUED | OUTPATIENT
Start: 2025-04-04 | End: 2025-04-05

## 2025-04-04 NOTE — ED PROVIDER NOTES
Patient Seen in: [facility] Emergency Department      History     Chief Complaint   Patient presents with    Abdomen/Flank Pain     Stated Complaint: Epigastric pain x 2 days    Subjective:   HPI  89-year-old gentleman, history of CAD hypertension, here for evaluation of epigastric pain over the past 2 to 3 days seems to be worse when he eats.  Has been taking Mylanta helped initially but no longer providing any relief.  Denies any vomiting or diarrhea denies any exertional chest pain any other complaints wife states he also has been occasionally mentioning strange things, for instance the other day he mentioned that he was trying to figure out how to get a chocolate necklace to see if that would help.  Patient cannot explain making the statement his wife was unsure what he meant.  No reported falls injuries fevers vomiting diarrhea any other complaints or concerns.        Objective:     Past Medical History:    Coronary atherosclerosis of unspecified type of vessel, native or graft    Esophageal reflux    High blood pressure    High cholesterol    History of TIA (transient ischemic attack)    Hypotension due to drugs    Kidney stone    Mixed hyperlipidemia    Multiple closed fractures of ribs of right side    Nontraumatic complete tear of left rotator cuff    JAMESON (obstructive sleep apnea)    AHI-33, supine AHI-48, lateral AHI-3/CPAP-10cwp    Other and unspecified personal history of malignant neoplasm    prostate    RLS (restless legs syndrome)    Transient global amnesia    Unspecified essential hypertension    Unspecified transient cerebral ischemia    Vertigo    85 y/o presented with chronic gait disturbance, unsteadiness, dizziness, no vertigo or n/v when asked him in detail. He had almost every two years of brain MRI due to his similar complaints. I have reviewed his brain MRI image with him and his wife which showed no evidence of NPH, but moderate white matter changes.               Past Surgical  History:   Procedure Laterality Date    Drug-eluting stents, single      Lumbar discectomy  2021    L4 L5    Other surgical history  08    brachytherapy of prostate    Other surgical history  12    cysto, lt urs, litho, stone extraction of fragments, rpg, lt stent placement- Dr. Braun    Other surgical history  8/3/12    cysto stent removal- Dr. Braun    Other surgical history  1/2/15    Rt hip s/p evacuation of hematoma                Social History     Socioeconomic History    Marital status:    Tobacco Use    Smoking status: Former     Current packs/day: 0.00     Average packs/day: 1 pack/day for 15.0 years (15.0 ttl pk-yrs)     Types: Cigarettes     Start date: 1974     Quit date: 1989     Years since quittin.2    Smokeless tobacco: Former   Vaping Use    Vaping status: Never Used   Substance and Sexual Activity    Alcohol use: Yes     Alcohol/week: 1.0 standard drink of alcohol     Types: 1 Shots of liquor per week     Comment: very socially    Drug use: Never    Sexual activity: Yes     Partners: Female   Social History Narrative    : , second marriage    Children: 3 and 2 step    Exercise: walks, yardwork    Employment: retired    Caffeine intake: 1 coffee/d    Son going thru a divorce. Helping him move. Son moving to [place].      Social Drivers of Health     Food Insecurity: No Food Insecurity (2025)    NCSS - Food Insecurity     Worried About Running Out of Food in the Last Year: No     Ran Out of Food in the Last Year: No   Transportation Needs: No Transportation Needs (2025)    NCSS - Transportation     Lack of Transportation: No   Housing Stability: Not At Risk (2025)    NCSS - Housing/Utilities     Has Housing: Yes     Worried About Losing Housing: No     Unable to Get Utilities: No                  Physical Exam     ED Triage Vitals [25 1026]   /63   Pulse 66   Resp 17   Temp 97.8 °F (36.6 °C)   Temp src Oral   SpO2 96 %   O2 Device None  (Room air)       Current Vitals:   Vital Signs  BP: 155/70  Pulse: 66  Resp: 18  Temp: 97.8 °F (36.6 °C)  Temp src: Oral  MAP (mmHg): 96    Oxygen Therapy  SpO2: 99 %  O2 Device: None (Room air)        Physical Exam      Physical Exam  Vitals signs and nursing note reviewed.   General:  Patient laying supine in the bed in no acute distress  Head: Normocephalic and atraumatic.   HEENT:  Mucous membranes are moist.   Cardiovascular:  Normal rate and regular rhythm.  No Edema  Pulmonary:  Pulmonary effort is normal.  Normal breath sounds. No wheezing, rhonchi or rales.   Abdominal: Soft, mild epigastric tenderness abdomen S nondistended, normal bowel sounds, no guarding no rebound tenderness  Skin: Warm and dry  Neurological: Awake alert, speech is normal        ED Course     Labs Reviewed   COMP METABOLIC PANEL (14) - Abnormal; Notable for the following components:       Result Value    Glucose 123 (*)     BUN 30 (*)     Creatinine 1.42 (*)     Calcium, Total 12.1 (*)     Calculated Osmolality 304 (*)     eGFR-Cr 47 (*)     Albumin 4.9 (*)     All other components within normal limits   CBC WITH DIFFERENTIAL WITH PLATELET - Abnormal; Notable for the following components:    Lymphocyte Absolute 0.73 (*)     All other components within normal limits   URINALYSIS WITH CULTURE REFLEX - Abnormal; Notable for the following components:    Spec Gravity >1.030 (*)     All other components within normal limits   LIPASE - Normal     EKG    Rate, intervals and axes as noted on EKG Report.  Rate: 63  Rhythm: Sinus Rhythm  Reading: Normal intervals no acute ischemic changes                CT ABDOMEN+PELVIS(CONTRAST ONLY)(CPT=74177)    Result Date: 4/4/2025  PROCEDURE:  CT ABDOMEN+PELVIS (CONTRAST ONLY) (CPT=74177)  COMPARISON:  [facility] , MR, MRI ABDOMEN&MRCP W/3D (CPT=74181/88690), 5/08/2023, 10:23 PM.  [facility] , CT, CT ABDOMEN PELVIS IV CONTRAST, NO ORAL (ER), 5/06/2023, 8:16 PM.  INDICATIONS:  epigastric and llq pain  TECHNIQUE:   CT scanning was performed from the dome of the diaphragm to the pubic symphysis with non-ionic intravenous contrast material. Post contrast coronal MPR imaging was performed.  Dose reduction techniques were used. Dose information is transmitted to the ACR (American College of Radiology) NRDR (National Radiology Data Registry) which includes the Dose Index Registry.  PATIENT STATED HISTORY:(As transcribed by Technologist)  Patient is here with epigastric and LLQ pain   CONTRAST USED:  90cc of Isovue 370  FINDINGS:  LUNG BASES:  Chronic consolidation within the right lower lobe likely represent chronic atelectasis.  Coronary artery calcifications.  Hiatal hernia. LIVER:  Normal in shape and contour.  Hepatic steatosis. BILIARY:  Gallbladder is unremarkable in appearance.  No intrahepatic biliary dilatation.. SPLEEN:  Normal.  No enlargement or focal lesion. PANCREAS:  No ravi-pancreatic inflammatory stranding ADRENALS:  Normal.  No mass or enlargement.  KIDNEYS:  Kidneys are symmetrical in size without evidence of hydronephrosis.  Bilateral renal cysts. BOWEL/MESENTERY:  Bowel is normal in caliber. No evidence of obstruction.  Colonic diverticulosis without evidence of diverticulitis.  Mild wall thickening the gastric pylorus PELVIS:  Bladder is unremarkable in appearance.  Calcified phleboliths in the pelvis.  Prostate seed implants.  No free pelvic fluid.   Fat containing left inguinal hernia. AORTA/VASCULAR:  Aorta is normal in caliber.  Atheromatous calcifications of the aorta. BONES:  Multilevel degenerative changes of the lumbar spine.            CONCLUSION:  1. Mild wall thickening the gastric pylorus which may be secondary incomplete distension but could also represent mild gastritis.  Correlation with clinical symptoms. 2. Hiatal hernia. 3. Colonic diverticulosis without evidence of diverticulitis. 4. Hepatic steatosis    LOCATION:  [facility]   Dictated by (CST): Richard Zapata MD on 4/04/2025 at 1:35 PM      Finalized by (CST): Richard Zapata MD on 4/04/2025 at 1:38 PM             MDM      This is a 89-year-old gentleman here for evaluation of epigastric pain.  Differential includes ACS gastritis peptic ulcer disease, pancreatitis.  Basic labs significant for elevated creatinine 1.4 from 1 at baseline, lipase is normal, calcium noted to be 12.1 BUN elevated.  Believe patient is likely dehydrated secondary to decreased p.o. intake with discomfort in the epigastrium.  A CT abdomen pelvis was performed shows possible gastritis.  Patient did receive 40 mg PPI bolus.  Plan to admit for further IV hydration and monitoring calcium, Case endorsed to the [facility] hospitalist agrees with plan for admission, [facility] requested GI consult did speak with GI and will see the patient.    I independently viewed and interpreted the following imaging: CT abdomen pelvis without evidence of bowel obstruction    Admission disposition: 4/4/2025  1:58 PM           Medical Decision Making      Disposition and Plan     Clinical Impression:  1. Hypercalcemia    2. Altered mental status, unspecified altered mental status type    3. Abdominal pain of unknown etiology         Disposition:  Admit  4/4/2025  1:58 pm    Follow-up:  No follow-up provider specified.        Medications Prescribed:  Current Discharge Medication List              Supplementary Documentation:         Hospital Problems       Present on Admission  Date Reviewed: 12/4/2022            ICD-10-CM Noted POA    * (Principal) Hypercalcemia E83.52 4/4/2025 Unknown    Azotemia R79.89 4/4/2025 Yes    Hyperglycemia R73.9 4/4/2025 Yes

## 2025-04-04 NOTE — ED INITIAL ASSESSMENT (HPI)
Constant epigastric pain 9/10 for 4 days, expresses concerns for acid reflux, taking mylanta and protonix as prescribed without relief. Patient is somnolent, oriented X4; denies dyspnea, denies N/V

## 2025-04-04 NOTE — PLAN OF CARE
Admission navigator completed by admission RN.     +JAMESON BUT HASN'T worn a cpap in a long time. \"It didn't work so why wear it\".     Admission meds reviewed with wife over the phone    Fell last week. Up with cane.     ER doc aware that pt drank a bottle of Mylanta in the last day or so. (Family wanted to rely the message d/t elevated calcium in Mylanta) ER doc still wants pt to stay and be admitted still.

## 2025-04-04 NOTE — ED QUICK NOTES
Orders for admission, patient is aware of plan and ready to go upstairs. Any questions, please call ED RN Scarlet at extension [phone].     Patient Covid vaccination status: Fully vaccinated     COVID Test Ordered in ED: None    COVID Suspicion at Admission: N/A    Running Infusions: 0.9      Mental Status/LOC at time of transport: AOX4    Other pertinent information:   -epigastric pain    CIWA score: N/A   NIH score:  N/A

## 2025-04-04 NOTE — H&P
[facility] History and Physical      Chief Complaint   Patient presents with    Abdomen/Flank Pain        PCP: Deirdre Roldan MD      History of Present Illness: Patient is a 89 year old male with PMH sig for CAD status post stents, CKD 3, hypertension, hyperlipidemia, history of TIA, GERD, JAMESON presented with abdominal pain.  Has been reporting epigastric pain for the last 4 to 5 days.  It is described as 9 out of 10.  He is concerned it could be severe acid reflux.  He has been taking Mylanta, Protonix and Tums.  Given the ongoing symptoms he came to the ED.  In the ED he was hypertensive.  Labs significant for BUN/creatinine of 30/1.4, calcium 12.1.  EKG unremarkable.  CT abdomen and pelvis with mild wall thickening of the gastric pylorus along with hiatal hernia and hepatic steatosis.  Given IV Protonix along with IV fluids.  Admitted for further evaluation and treatment.  GI consulted.    Past Medical History:    Coronary atherosclerosis of unspecified type of vessel, native or graft    Esophageal reflux    High blood pressure    High cholesterol    History of TIA (transient ischemic attack)    Hypotension due to drugs    Kidney stone    Mixed hyperlipidemia    Multiple closed fractures of ribs of right side    Nontraumatic complete tear of left rotator cuff    JAMESON (obstructive sleep apnea)    AHI-33, supine AHI-48, lateral AHI-3/CPAP-10cwp    Other and unspecified personal history of malignant neoplasm    prostate    Transient global amnesia    Unspecified essential hypertension    Unspecified transient cerebral ischemia    Vertigo    85 y/o presented with chronic gait disturbance, unsteadiness, dizziness, no vertigo or n/v when asked him in detail. He had almost every two years of brain MRI due to his similar complaints. I have reviewed his brain MRI image with him and his wife which showed no evidence of NPH, but moderate white matter changes.       Past Surgical History:   Procedure Laterality  Date    Drug-eluting stents, single      Lumbar discectomy  2021    L4 L5    Other surgical history  08    brachytherapy of prostate    Other surgical history  12    cysto, lt urs, litho, stone extraction of fragments, rpg, lt stent placement- Dr. Braun    Other surgical history  8/3/12    cysto stent removal- Dr. Braun    Other surgical history  1/2/15    Rt hip s/p evacuation of hematoma        ALL:  Allergies[1]     Prior to Admission Medications   Medication Sig    PRAMIPEXOLE 0.5 MG Oral Tab TAKE 2 TABLETS BY MOUTH  DAILY FOR 1 WEEK THEN 1  TABLET BY MOUTH DAILY    pantoprazole 40 MG Oral Tab EC Take 1 tablet (40 mg total) by mouth 2 (two) times a day. Before meal    Cholecalciferol (VITAMIN D3) 25 MCG (1000 UT) Oral Cap Take 1 tablet by mouth daily.    celecoxib (CELEBREX) 100 MG Oral Cap Take 1 capsule (100 mg total) by mouth 2 (two) times daily.    Acetaminophen (TYLENOL) 325 MG Oral Cap Take by mouth.    Psyllium (METAMUCIL OR) Take 1 capsule by mouth daily.    MAGNESIUM OR Take 1 tablet by mouth daily.    B Complex Vitamins (VITAMIN B COMPLEX OR) Take 1 tablet by mouth daily.       Social History     Tobacco Use    Smoking status: Former     Current packs/day: 0.00     Average packs/day: 1 pack/day for 15.0 years (15.0 ttl pk-yrs)     Types: Cigarettes     Start date: 1974     Quit date: 1989     Years since quittin.2    Smokeless tobacco: Former   Substance Use Topics    Alcohol use: Not Currently     Alcohol/week: 1.0 standard drink of alcohol     Types: 1 Shots of liquor per week        Fam Hx  Family History   Problem Relation Age of Onset    Cancer Father     Cancer Mother     Heart Disorder Mother     Cancer Sister         esophageal/stomach ca    Cancer Brother         prostate ca    Heart Disorder Brother         cabg    Cancer Sister         breast ca    Cancer Sister         breast ca    Cancer Sister         breast/colon ca       Review of Systems  Comprehensive ROS  reviewed and negative except for what is stated in HPI.      OBJECTIVE:  BP (!) 172/76   Pulse 56   Temp 97.8 °F (36.6 °C) (Oral)   Resp 16   Ht 5' 8\" (1.727 m)   Wt 190 lb (86.2 kg)   SpO2 100%   BMI 28.89 kg/m²   General:  Alert, no distress, appears stated age.    Head:  Normocephalic, without obvious abnormality, atraumatic.   Eyes:  Sclera anicteric, No conjunctival pallor, EOMs intact.    Nose: Nares normal. Septum midline. Mucosa normal. No drainage.   Throat: Lips, mucosa, and tongue with dry oral mucosa. Teeth and gums normal.   Neck: Supple, symmetrical, trachea midline, no cervical or supraclavicular lymph adenopathy, thyroid: no enlargment/tenderness/nodules appreciated   Lungs:   Clear to auscultation bilaterally. Normal effort   Chest wall:  No tenderness or deformity.   Heart:  Regular rate and rhythm, S1, S2 normal, no murmur, rub or gallop appreciated   Abdomen:   Soft, non-tender. Bowel sounds normal. No masses,  No organomegaly. Non distended   Extremities: Extremities normal, atraumatic, no cyanosis or edema.   Skin: Skin color, texture, turgor normal. No rashes or lesions.    Neurologic: Normal strength, no focal deficit appreciated     Data Review:    LABS:   Lab Results   Component Value Date    WBC 9.1 04/04/2025    HGB 16.5 04/04/2025    HCT 50.1 04/04/2025    .0 04/04/2025    CREATSERUM 1.42 04/04/2025    BUN 30 04/04/2025     04/04/2025    K 4.7 04/04/2025     04/04/2025    CO2 28.0 04/04/2025     04/04/2025    CA 12.1 04/04/2025    ALB 4.9 04/04/2025    ALKPHO 69 04/04/2025    BILT 0.5 04/04/2025    TP 7.3 04/04/2025    AST 25 04/04/2025    ALT 27 04/04/2025    LIP 41 04/04/2025       CXR: All imaging personally reviewed.      Radiology: CT ABDOMEN+PELVIS(CONTRAST ONLY)(CPT=74177)    Result Date: 4/4/2025  PROCEDURE:  CT ABDOMEN+PELVIS (CONTRAST ONLY) (CPT=74177)  COMPARISON:  [facility] , MR, MRI ABDOMEN&MRCP W/3D (CPT=74181/81856), 5/08/2023, 10:23 PM.   [facility] , CT, CT ABDOMEN PELVIS IV CONTRAST, NO ORAL (ER), 5/06/2023, 8:16 PM.  INDICATIONS:  epigastric and llq pain  TECHNIQUE:  CT scanning was performed from the dome of the diaphragm to the pubic symphysis with non-ionic intravenous contrast material. Post contrast coronal MPR imaging was performed.  Dose reduction techniques were used. Dose information is transmitted to the ACR (American College of Radiology) NRDR (National Radiology Data Registry) which includes the Dose Index Registry.  PATIENT STATED HISTORY:(As transcribed by Technologist)  Patient is here with epigastric and LLQ pain   CONTRAST USED:  90cc of Isovue 370  FINDINGS:  LUNG BASES:  Chronic consolidation within the right lower lobe likely represent chronic atelectasis.  Coronary artery calcifications.  Hiatal hernia. LIVER:  Normal in shape and contour.  Hepatic steatosis. BILIARY:  Gallbladder is unremarkable in appearance.  No intrahepatic biliary dilatation.. SPLEEN:  Normal.  No enlargement or focal lesion. PANCREAS:  No ravi-pancreatic inflammatory stranding ADRENALS:  Normal.  No mass or enlargement.  KIDNEYS:  Kidneys are symmetrical in size without evidence of hydronephrosis.  Bilateral renal cysts. BOWEL/MESENTERY:  Bowel is normal in caliber. No evidence of obstruction.  Colonic diverticulosis without evidence of diverticulitis.  Mild wall thickening the gastric pylorus PELVIS:  Bladder is unremarkable in appearance.  Calcified phleboliths in the pelvis.  Prostate seed implants.  No free pelvic fluid.   Fat containing left inguinal hernia. AORTA/VASCULAR:  Aorta is normal in caliber.  Atheromatous calcifications of the aorta. BONES:  Multilevel degenerative changes of the lumbar spine.            CONCLUSION:  1. Mild wall thickening the gastric pylorus which may be secondary incomplete distension but could also represent mild gastritis.  Correlation with clinical symptoms. 2. Hiatal hernia. 3. Colonic diverticulosis without evidence  of diverticulitis. 4. Hepatic steatosis    LOCATION:  [facility]   Dictated by (CST): Richard Zapata MD on 4/04/2025 at 1:35 PM     Finalized by (CST): Richard Zapata MD on 4/04/2025 at 1:38 PM          Assessment/Plan:     89 year old male with PMH sig for CAD status post stents, CKD 3, hypertension, hyperlipidemia, history of TIA, GERD, JAMESON presented with abdominal pain.    Epigastric pain  Gastritis/GERD  - cont protonix IV BID  - GI cocktail prn  - consult GI    Hypercalcemia  - could be from increased Tums intake vs. Dehydration  - cont isotonic crystalloids  - repeat labs in AM  - check ionized Ca    Chronic:  CAD s/p stents  CKD3 - stable, avoid nephrotoxins  Essential HTN  Hyperlipidemia  Hx TIA  JAMESON  - resume home meds as indicated     FEN: regular diet, PT/OT  Proph: SCDs, heparin  Code status: DNAR/select    Outpatient records or previous hospital records reviewed.   [facility] hospitalist to continue to follow patient while in house      Josias Vargas MD  [facility]  Hospitalist  Message over [employer]  Pager: [phone]                 [1]   Allergies  Allergen Reactions    Dilaudid [Hydromorphone] HALLUCINATION    Hydrocodone HALLUCINATION

## 2025-04-05 VITALS
DIASTOLIC BLOOD PRESSURE: 78 MMHG | OXYGEN SATURATION: 90 % | BODY MASS INDEX: 28.97 KG/M2 | WEIGHT: 191.13 LBS | HEART RATE: 61 BPM | HEIGHT: 68 IN | RESPIRATION RATE: 18 BRPM | TEMPERATURE: 98 F | SYSTOLIC BLOOD PRESSURE: 131 MMHG

## 2025-04-05 LAB
ALBUMIN SERPL-MCNC: 4.3 G/DL (ref 3.2–4.8)
ALBUMIN/GLOB SERPL: 2 {RATIO} (ref 1–2)
ALP LIVER SERPL-CCNC: 61 U/L
ALT SERPL-CCNC: 23 U/L
ANION GAP SERPL CALC-SCNC: 7 MMOL/L (ref 0–18)
AST SERPL-CCNC: 23 U/L (ref ?–34)
BASOPHILS # BLD AUTO: 0.03 X10(3) UL (ref 0–0.2)
BASOPHILS NFR BLD AUTO: 0.6 %
BILIRUB SERPL-MCNC: 0.8 MG/DL (ref 0.2–1.1)
BUN BLD-MCNC: 25 MG/DL (ref 9–23)
CALCIUM BLD-MCNC: 9.8 MG/DL (ref 8.7–10.6)
CHLORIDE SERPL-SCNC: 109 MMOL/L (ref 98–112)
CO2 SERPL-SCNC: 25 MMOL/L (ref 21–32)
CREAT BLD-MCNC: 1.11 MG/DL
EGFRCR SERPLBLD CKD-EPI 2021: 63 ML/MIN/1.73M2 (ref 60–?)
EOSINOPHIL # BLD AUTO: 0.16 X10(3) UL (ref 0–0.7)
EOSINOPHIL NFR BLD AUTO: 2.9 %
ERYTHROCYTE [DISTWIDTH] IN BLOOD BY AUTOMATED COUNT: 12.7 %
GLOBULIN PLAS-MCNC: 2.2 G/DL (ref 2–3.5)
GLUCOSE BLD-MCNC: 90 MG/DL (ref 70–99)
HCT VFR BLD AUTO: 45.9 %
HGB BLD-MCNC: 15.4 G/DL
IMM GRANULOCYTES # BLD AUTO: 0.02 X10(3) UL (ref 0–1)
IMM GRANULOCYTES NFR BLD: 0.4 %
LYMPHOCYTES # BLD AUTO: 0.79 X10(3) UL (ref 1–4)
LYMPHOCYTES NFR BLD AUTO: 14.5 %
MAGNESIUM SERPL-MCNC: 2.4 MG/DL (ref 1.6–2.6)
MCH RBC QN AUTO: 31.9 PG (ref 26–34)
MCHC RBC AUTO-ENTMCNC: 33.6 G/DL (ref 31–37)
MCV RBC AUTO: 95 FL
MONOCYTES # BLD AUTO: 0.44 X10(3) UL (ref 0.1–1)
MONOCYTES NFR BLD AUTO: 8.1 %
NEUTROPHILS # BLD AUTO: 4.01 X10 (3) UL (ref 1.5–7.7)
NEUTROPHILS # BLD AUTO: 4.01 X10(3) UL (ref 1.5–7.7)
NEUTROPHILS NFR BLD AUTO: 73.5 %
OSMOLALITY SERPL CALC.SUM OF ELEC: 296 MOSM/KG (ref 275–295)
PLATELET # BLD AUTO: 184 10(3)UL (ref 150–450)
POTASSIUM SERPL-SCNC: 4.4 MMOL/L (ref 3.5–5.1)
PROT SERPL-MCNC: 6.5 G/DL (ref 5.7–8.2)
RBC # BLD AUTO: 4.83 X10(6)UL
SODIUM SERPL-SCNC: 141 MMOL/L (ref 136–145)
WBC # BLD AUTO: 5.5 X10(3) UL (ref 4–11)

## 2025-04-05 PROCEDURE — 80053 COMPREHEN METABOLIC PANEL: CPT | Performed by: HOSPITALIST

## 2025-04-05 PROCEDURE — 85025 COMPLETE CBC W/AUTO DIFF WBC: CPT | Performed by: HOSPITALIST

## 2025-04-05 PROCEDURE — 83735 ASSAY OF MAGNESIUM: CPT | Performed by: HOSPITALIST

## 2025-04-05 RX ORDER — PANTOPRAZOLE SODIUM 40 MG/1
40 TABLET, DELAYED RELEASE ORAL
Qty: 28 TABLET | Refills: 0 | Status: SHIPPED | OUTPATIENT
Start: 2025-04-05 | End: 2025-04-19

## 2025-04-05 NOTE — PLAN OF CARE
Problem: Patient/Family Goals  Goal: Patient/Family Long Term Goal  Description: Patient's Long Term Goal: go to [facility]  Interventions:- follow poc- See additional Care Plan goals for specific interventions  Outcome: Progressing  Goal: Patient/Family Short Term Goal  Description: Patient's Short Term Goal: 4/4 nocs: pain control  Interventions: -   - prn magic mouthwash, tylenol  - See additional Care Plan goals for specific interventions  Outcome: Progressing     Problem: PAIN - ADULT  Goal: Verbalizes/displays adequate comfort level or patient's stated pain goal  Description: INTERVENTIONS:- Encourage pt to monitor pain and request assistance- Assess pain using appropriate pain scale- Administer analgesics based on type and severity of pain and evaluate response- Implement non-pharmacological measures as appropriate and evaluate response- Consider cultural and social influences on pain and pain management- Manage/alleviate anxiety- Utilize distraction and/or relaxation techniques- Monitor for opioid side effects- Notify MD/LIP if interventions unsuccessful or patient reports new pain- Anticipate increased pain with activity and pre-medicate as appropriate  Outcome: Progressing     Patient received tonight alert and oriented x4, forgetful.  He is on room air, tele sr.  He admits to being [place] and states he left his hearing aids at home.  Glasses are at bedside.  Ivf infusing to right arm as ordered , site is without incidence.  Discussed poc, pt and his wife both verbalized understanding.  Safety and comfort measures provided.

## 2025-04-05 NOTE — DISCHARGE SUMMARY
[facility] Hospitalist Discharge Summary     Patient ID:  Maria Elena Moon  89 year old  3/23/1935    Admit date: 4/4/2025    Discharge date and time:  4/5/2025 11:00 AM     Attending Physician: No att. providers found     Primary Care Physician: Deirrde Roldan MD     Discharge Diagnoses: Hypercalcemia [E83.52]  Abdominal pain of unknown etiology [R10.9]  Altered mental status, unspecified altered mental status type [R41.82]    Please note that only [facility] and [facility] patients enrolled in the Medicare ACO, BCBS ACO and BCBS HMOs will be handled by the [facility] Care Management team.  For all other patients, please follow usual protocol for discharge care transition.    Discharge Condition: stable    Disposition:  home    Important Follow up:  - PCP within 2 weeks       Follow-up Information       Deirdre Roldan MD Follow up in 1 week(s).    Specialty: Internal Medicine  Why: hosptilization follow-up  Contact information:  [facility] [place] [address]  [phone]                                 Hospital Course:        89 year old male with PMH sig for CAD status post stents, CKD 3, hypertension, hyperlipidemia, history of TIA, GERD, JAMESON presented with abdominal pain.  Has been reporting epigastric pain for the last 4 to 5 days.  It is described as 9 out of 10.  He is concerned it could be severe acid reflux.  He has been taking Mylanta, Protonix and Tums.  Given the ongoing symptoms he came to the ED.  In the ED he was hypertensive.  Labs significant for BUN/creatinine of 30/1.4, calcium 12.1.  EKG unremarkable.  CT abdomen and pelvis with mild wall thickening of the gastric pylorus along with hiatal hernia and hepatic steatosis.  Given IV Protonix along with IV fluids.  Admitted for further evaluation and treatment.  GI consulted.    Epigastric pain  Gastritis/GERD  - cont protonix IV BID - cont protonix BID as OP, OP GI f/u,  declined to wait for GI evaluation in the hospital, epigastric pain had resolved  - GI cocktail prn     Hypercalcemia - resolved with fluids  - could be from increased Tums intake vs. Dehydration  - cont isotonic crystalloids     Chronic:  CAD s/p stents  CKD3 - stable, avoid nephrotoxins  Essential HTN  Hyperlipidemia  Hx TIA  JAMESON    Consults: IP CONSULT TO HOSPITALIST  IP CONSULT TO GASTROENTEROLOGY    Operative Procedures:        Patient instructions:      I as the attending physician reconciled the current and discharge medications on day of discharge.     Discharge Medication List as of 4/5/2025 10:35 AM        START taking these medications    Details   pantoprazole 40 MG Oral Tab EC Take 1 tablet (40 mg total) by mouth 2 (two) times daily before meals for 14 days., Normal, Disp-28 tablet, R-0           CONTINUE these medications which have NOT CHANGED    Details   rosuvastatin 40 MG Oral Tab Take 1 tablet (40 mg total) by mouth nightly., Historical      Multiple Vitamins-Minerals (EYE MULTIVITAMIN OR) Take 1 tablet by mouth daily., Historical      traZODone 50 MG Oral Tab Take 1 tablet (50 mg total) by mouth nightly., Historical      PRAMIPEXOLE 0.5 MG Oral Tab TAKE 2 TABLETS BY MOUTH  DAILY FOR 1 WEEK THEN 1  TABLET BY MOUTH DAILY, Normal, Disp-90 tablet, R-0Requesting 1 year supply      Cholecalciferol (VITAMIN D3) 25 MCG (1000 UT) Oral Cap Take 1 tablet by mouth daily., Historical, Disp-30 capsule, R-0      Acetaminophen (TYLENOL) 325 MG Oral Cap Take by mouth., Historical      Psyllium (METAMUCIL OR) Take 1 capsule by mouth daily., Historical      B Complex Vitamins (VITAMIN B COMPLEX OR) Take 1 tablet by mouth daily., Historical           STOP taking these medications       Esomeprazole Magnesium 40 MG Oral Capsule Delayed Release              Activity: activity as tolerated  Diet: regular diet  Wound Care: as directed  Code Status: DNAR/Selective Treatment      Discharge Exam:     General: no acute  distress, alert and oriented x 3  Heart: RRR  Lungs: clear bilaterally, no active wheezing  Abdomen: nontender, nondistended, intact BS  Extremities: no pedal edema   Neuro: CN inact, no focal deficits      Total time coordinating care for discharge: Greater than 30 minutes    Josias Vargas MD  [facility]

## 2025-04-05 NOTE — PLAN OF CARE
Problem: Patient/Family Goals  Goal: Patient/Family Long Term Goal  Description: Patient's Long Term Goal: go to [email or web address]  Interventions:- follow poc- See additional Care Plan goals for specific interventions  Outcome: Completed  Goal: Patient/Family Short Term Goal  Description: Patient's Short Term Goal: 4/4 nocs: pain control  Interventions: -   - prn magic mouthwash, tylenol  - See additional Care Plan goals for specific interventions  Outcome: Completed     Problem: PAIN - ADULT  Goal: Verbalizes/displays adequate comfort level or patient's stated pain goal  Description: INTERVENTIONS:- Encourage pt to monitor pain and request assistance- Assess pain using appropriate pain scale- Administer analgesics based on type and severity of pain and evaluate response- Implement non-pharmacological measures as appropriate and evaluate response- Consider cultural and social influences on pain and pain management- Manage/alleviate anxiety- Utilize distraction and/or relaxation techniques- Monitor for opioid side effects- Notify MD/LIP if interventions unsuccessful or patient reports new pain- Anticipate increased pain with activity and pre-medicate as appropriate  Outcome: Completed

## 2025-04-05 NOTE — PROGRESS NOTES
NURSING DISCHARGE NOTE    Discharged Home via Wheelchair.  Accompanied by RN  Belongings Taken by patient/family.    Discharge education provided. All questions and concerns were answered. PIV removed. Tele and pulse ox returned. All paperwork are given to pt. Discharge navigator completed.

## 2025-04-09 NOTE — PAYOR COMM NOTE
--------------  ADMISSION REVIEW     Payor: [employer]  Subscriber #:  [id number]  Authorization Number: [id number]    Admit date: 4/4/25  Admit time:  7:22 PM       REVIEW DOCUMENTATION:     ED Provider Notes        ED Provider Notes signed by Manny Bar MD at 4/4/2025  4:26 PM       Author: Manny Bar MD Service: Emergency Medicine Author Type: Physician    Filed: 4/4/2025  4:26 PM Date of Service: 4/4/2025  4:22 PM Status: Signed    : Manny Bar MD (Physician)           Patient Seen in: [facility] Emergency Department      History     Chief Complaint   Patient presents with    Abdomen/Flank Pain     Stated Complaint: Epigastric pain x 2 days    Subjective:   HPI  89-year-old gentleman, history of CAD hypertension, here for evaluation of epigastric pain over the past 2 to 3 days seems to be worse when he eats.  Has been taking Mylanta helped initially but no longer providing any relief.  Denies any vomiting or diarrhea denies any exertional chest pain any other complaints wife states he also has been occasionally mentioning strange things, for instance the other day he mentioned that he was trying to figure out how to get a chocolate necklace to see if that would help.  Patient cannot explain making the statement his wife was unsure what he meant.  No reported falls injuries fevers vomiting diarrhea any other complaints or concerns.        Objective:     Past Medical History:    Coronary atherosclerosis of unspecified type of vessel, native or graft    Esophageal reflux    High blood pressure    High cholesterol    History of TIA (transient ischemic attack)    Hypotension due to drugs    Kidney stone    Mixed hyperlipidemia    Multiple closed fractures of ribs of right side    Nontraumatic complete tear of left rotator cuff    JAMESON (obstructive sleep apnea)    AHI-33, supine AHI-48, lateral AHI-3/CPAP-10cwp    Other and unspecified personal history of malignant neoplasm    prostate     RLS (restless legs syndrome)    Transient global amnesia    Unspecified essential hypertension    Unspecified transient cerebral ischemia    Vertigo    87 y/o presented with chronic gait disturbance, unsteadiness, dizziness, no vertigo or n/v when asked him in detail. He had almost every two years of brain MRI due to his similar complaints. I have reviewed his brain MRI image with him and his wife which showed no evidence of NPH, but moderate white matter changes.               Past Surgical History:   Procedure Laterality Date    Drug-eluting stents, single  1989    Lumbar discectomy  11/01/2021    L4 L5    Other surgical history  5-1-08    brachytherapy of prostate    Other surgical history  7/30/12    cysto, lt urs, litho, stone extraction of fragments, rpg, lt stent placement- Dr. Braun    Other surgical history  8/3/12    cysto stent removal- Dr. Braun    Other surgical history  1/2/15    Rt hip s/p evacuation of hematoma             Physical Exam     ED Triage Vitals [04/04/25 1026]   /63   Pulse 66   Resp 17   Temp 97.8 °F (36.6 °C)   Temp src Oral   SpO2 96 %   O2 Device None (Room air)       Current Vitals:   Vital Signs  BP: 155/70  Pulse: 66  Resp: 18  Temp: 97.8 °F (36.6 °C)  Temp src: Oral  MAP (mmHg): 96    Oxygen Therapy  SpO2: 99 %  O2 Device: None (Room air)        Physical Exam      Physical Exam  Vitals signs and nursing note reviewed.   General:  Patient laying supine in the bed in no acute distress  Head: Normocephalic and atraumatic.   HEENT:  Mucous membranes are moist.   Cardiovascular:  Normal rate and regular rhythm.  No Edema  Pulmonary:  Pulmonary effort is normal.  Normal breath sounds. No wheezing, rhonchi or rales.   Abdominal: Soft, mild epigastric tenderness abdomen S nondistended, normal bowel sounds, no guarding no rebound tenderness  Skin: Warm and dry  Neurological: Awake alert, speech is normal        ED Course     Labs Reviewed   COMP METABOLIC PANEL (14) - Abnormal; Notable  for the following components:       Result Value    Glucose 123 (*)     BUN 30 (*)     Creatinine 1.42 (*)     Calcium, Total 12.1 (*)     Calculated Osmolality 304 (*)     eGFR-Cr 47 (*)     Albumin 4.9 (*)     All other components within normal limits   CBC WITH DIFFERENTIAL WITH PLATELET - Abnormal; Notable for the following components:    Lymphocyte Absolute 0.73 (*)     All other components within normal limits   URINALYSIS WITH CULTURE REFLEX - Abnormal; Notable for the following components:    Spec Gravity >1.030 (*)     All other components within normal limits   LIPASE - Normal     EKG    Rate, intervals and axes as noted on EKG Report.  Rate: 63  Rhythm: Sinus Rhythm  Reading: Normal intervals no acute ischemic changes                CT ABDOMEN+PELVIS(CONTRAST ONLY)(CPT=74177)    Result Date: 4/4/2025  PROCEDURE:  CT ABDOMEN+PELVIS (CONTRAST ONLY) (CPT=74177)  COMPARISON:  [facility] , MR, MRI ABDOMEN&MRCP W/3D (CPT=74181/33145), 5/08/2023, 10:23 PM.  [facility] , CT, CT ABDOMEN PELVIS IV CONTRAST, NO ORAL (ER), 5/06/2023, 8:16 PM.  INDICATIONS:  epigastric and llq pain  TECHNIQUE:  CT scanning was performed from the dome of the diaphragm to the pubic symphysis with non-ionic intravenous contrast material. Post contrast coronal MPR imaging was performed.  Dose reduction techniques were used. Dose information is transmitted to the ACR (American College of Radiology) NRDR (National Radiology Data Registry) which includes the Dose Index Registry.  PATIENT STATED HISTORY:(As transcribed by Technologist)  Patient is here with epigastric and LLQ pain   CONTRAST USED:  90cc of Isovue 370  FINDINGS:  LUNG BASES:  Chronic consolidation within the right lower lobe likely represent chronic atelectasis.  Coronary artery calcifications.  Hiatal hernia. LIVER:  Normal in shape and contour.  Hepatic steatosis. BILIARY:  Gallbladder is unremarkable in appearance.  No intrahepatic biliary dilatation.. SPLEEN:  Normal.  No  enlargement or focal lesion. PANCREAS:  No ravi-pancreatic inflammatory stranding ADRENALS:  Normal.  No mass or enlargement.  KIDNEYS:  Kidneys are symmetrical in size without evidence of hydronephrosis.  Bilateral renal cysts. BOWEL/MESENTERY:  Bowel is normal in caliber. No evidence of obstruction.  Colonic diverticulosis without evidence of diverticulitis.  Mild wall thickening the gastric pylorus PELVIS:  Bladder is unremarkable in appearance.  Calcified phleboliths in the pelvis.  Prostate seed implants.  No free pelvic fluid.   Fat containing left inguinal hernia. AORTA/VASCULAR:  Aorta is normal in caliber.  Atheromatous calcifications of the aorta. BONES:  Multilevel degenerative changes of the lumbar spine.            CONCLUSION:  1. Mild wall thickening the gastric pylorus which may be secondary incomplete distension but could also represent mild gastritis.  Correlation with clinical symptoms. 2. Hiatal hernia. 3. Colonic diverticulosis without evidence of diverticulitis. 4. Hepatic steatosis    LOCATION:  [facility]   Dictated by (CST): Richard Zapata MD on 4/04/2025 at 1:35 PM     Finalized by (CST): Richard Zapata MD on 4/04/2025 at 1:38 PM            MDM      This is a 89-year-old gentleman here for evaluation of epigastric pain.  Differential includes ACS gastritis peptic ulcer disease, pancreatitis.  Basic labs significant for elevated creatinine 1.4 from 1 at baseline, lipase is normal, calcium noted to be 12.1 BUN elevated.  Believe patient is likely dehydrated secondary to decreased p.o. intake with discomfort in the epigastrium.  A CT abdomen pelvis was performed shows possible gastritis.  Patient did receive 40 mg PPI bolus.  Plan to admit for further IV hydration and monitoring calcium, Case endorsed to the [facility] hospitalist agrees with plan for admission, [facility] requested GI consult did speak with GI and will see the patient.    I independently viewed and interpreted the following imaging: CT  abdomen pelvis without evidence of bowel obstruction    Admission disposition: 4/4/2025  1:58 PM           Medical Decision Making      Disposition and Plan     Clinical Impression:  1. Hypercalcemia    2. Altered mental status, unspecified altered mental status type    3. Abdominal pain of unknown etiology         Disposition:  Admit  4/4/2025  1:58 pm    Follow-up:  No follow-up provider specified.        Medications Prescribed:  Current Discharge Medication List              Supplementary Documentation:         Hospital Problems       Present on Admission  Date Reviewed: 12/4/2022            ICD-10-CM Noted POA    * (Principal) Hypercalcemia E83.52 4/4/2025 Unknown    Azotemia R79.89 4/4/2025 Yes    Hyperglycemia R73.9 4/4/2025 Yes                  Signed by Manny Bar MD on 4/4/2025  4:26 PM         MEDICATIONS ADMINISTERED IN LAST 1 DAY:  Administration History       None            Vitals (last day) before discharge       Date/Time Temp Pulse Resp BP SpO2 Weight O2 Device O2 Flow Rate (L/min) [facility]    04/05/25 0838 98.2 °F (36.8 °C) 61 18 131/78 90 % -- None (Room air) -- AF    04/05/25 0402 98.4 °F (36.9 °C) 59 18 145/65 92 % -- None (Room air) -- LA    04/04/25 2338 97.7 °F (36.5 °C) 55 16 115/42 93 % -- None (Room air) -- LA    04/04/25 1948 -- -- -- -- 93 % -- None (Room air) -- LM    04/04/25 1937 98.4 °F (36.9 °C) 59 18 151/61 -- -- None (Room air) -- LA    04/04/25 1937 -- -- -- -- -- 191 lb 2.2 oz (86.7 kg) -- -- LM    04/04/25 1935 -- 61 -- 151/61 -- -- -- -- LA    04/04/25 1933 -- -- -- -- -- 191 lb 2.2 oz (86.7 kg) -- -- LA    04/04/25 1600 -- 65 16 169/76 98 % -- None (Room air) -- MM    04/04/25 1550 -- 66 18 -- 99 % -- None (Room air) -- MM    04/04/25 1457 -- 65 19 155/70 99 % -- None (Room air) -- SN    04/04/25 1300 -- 56 16 172/76 100 % -- None (Room air) -- SN    04/04/25 1230 -- 58 17 165/78 97 % -- None (Room air) -- SN    04/04/25 1200 -- 58 19 163/68 97 % -- None (Room air) -- SN     04/04/25 1130 -- 58 15 127/69 97 % -- None (Room air) -- SN    04/04/25 1032 -- -- -- -- -- -- None (Room air) --     04/04/25 1026 97.8 °F (36.6 °C) 66 17 156/63 96 % 190 lb (86.2 kg) None (Room air) -- SN

## 2025-04-09 NOTE — PAYOR COMM NOTE
--------------  DISCHARGE REVIEW    Payor: [employer]  Subscriber #:  [id number]  Authorization Number: [id number]    Admit date: 4/4/25  Admit time:   7:22 PM  Discharge Date: 4/5/2025 11:00 AM     Admitting Physician: Gordo Roca DO  Attending Physician:  No att. providers found  Primary Care Physician: Deirdre Roldan MD          Discharge Summary Notes        Discharge Summary signed by Raisa Vargas MD at 4/5/2025  1:17 PM       Author: Raisa Vargas MD Specialty: Internal Medicine Author Type: Physician    Filed: 4/5/2025  1:17 PM Date of Service: 4/5/2025  1:16 PM Status: Signed    : Raisa Vargas MD (Physician)                                                          [facility] Discharge Summary     Patient ID:  Maria Elena Moon  89 year old  3/23/1935    Admit date: 4/4/2025    Discharge date and time:  4/5/2025 11:00 AM     Attending Physician: No att. providers found     Primary Care Physician: Deirdre Roldan MD     Discharge Diagnoses: Hypercalcemia [E83.52]  Abdominal pain of unknown etiology [R10.9]  Altered mental status, unspecified altered mental status type [R41.82]    Please note that only [facility] DMG and EMG patients enrolled in the Medicare ACO, [facility] ACO and [facility] HMOs will be handled by the \[facility]\"" Care Management team.  For all other patients, please follow usual protocol for discharge care transition.    Discharge Condition: stable    Disposition:  home    Important Follow up:  - PCP within 2 weeks       Follow-up Information       Deirdre Roldan MD Follow up in 1 week(s).    Specialty: Internal Medicine  Why: hosptilization follow-up  Contact information:  [address]  [address] [address] [phone]                                 Hospital Course:        89 year old male with PMH sig for CAD status post stents, CKD 3, hypertension, hyperlipidemia, history of TIA, GERD, JAMESON  presented with abdominal pain.  Has been reporting epigastric pain for the last 4 to 5 days.  It is described as 9 out of 10.  He is concerned it could be severe acid reflux.  He has been taking Mylanta, Protonix and Tums.  Given the ongoing symptoms he came to the ED.  In the ED he was hypertensive.  Labs significant for BUN/creatinine of 30/1.4, calcium 12.1.  EKG unremarkable.  CT abdomen and pelvis with mild wall thickening of the gastric pylorus along with hiatal hernia and hepatic steatosis.  Given IV Protonix along with IV fluids.  Admitted for further evaluation and treatment.  GI consulted.    Epigastric pain  Gastritis/GERD  - cont protonix IV BID - cont protonix BID as OP, OP GI f/u, declined to wait for GI evaluation in the hospital, epigastric pain had resolved  - GI cocktail prn     Hypercalcemia - resolved with fluids  - could be from increased Tums intake vs. Dehydration  - cont isotonic crystalloids     Chronic:  CAD s/p stents  CKD3 - stable, avoid nephrotoxins  Essential HTN  Hyperlipidemia  Hx TIA  JAMESON    Consults: IP CONSULT TO HOSPITALIST  IP CONSULT TO GASTROENTEROLOGY    Operative Procedures:        Patient instructions:      I as the attending physician reconciled the current and discharge medications on day of discharge.     Discharge Medication List as of 4/5/2025 10:35 AM        START taking these medications    Details   pantoprazole 40 MG Oral Tab EC Take 1 tablet (40 mg total) by mouth 2 (two) times daily before meals for 14 days., Normal, Disp-28 tablet, R-0           CONTINUE these medications which have NOT CHANGED    Details   rosuvastatin 40 MG Oral Tab Take 1 tablet (40 mg total) by mouth nightly., Historical      Multiple Vitamins-Minerals (EYE MULTIVITAMIN OR) Take 1 tablet by mouth daily., Historical      traZODone 50 MG Oral Tab Take 1 tablet (50 mg total) by mouth nightly., Historical      PRAMIPEXOLE 0.5 MG Oral Tab TAKE 2 TABLETS BY MOUTH  DAILY FOR 1 WEEK THEN 1  TABLET  BY MOUTH DAILY, Normal, Disp-90 tablet, R-0Requesting 1 year supply      Cholecalciferol (VITAMIN D3) 25 MCG (1000 UT) Oral Cap Take 1 tablet by mouth daily., Historical, Disp-30 capsule, R-0      Acetaminophen (TYLENOL) 325 MG Oral Cap Take by mouth., Historical      Psyllium (METAMUCIL OR) Take 1 capsule by mouth daily., Historical      B Complex Vitamins (VITAMIN B COMPLEX OR) Take 1 tablet by mouth daily., Historical           STOP taking these medications       Esomeprazole Magnesium 40 MG Oral Capsule Delayed Release              Activity: activity as tolerated  Diet: regular diet  Wound Care: as directed  Code Status: DNAR/Selective Treatment      Discharge Exam:     General: no acute distress, alert and oriented x 3  Heart: RRR  Lungs: clear bilaterally, no active wheezing  Abdomen: nontender, nondistended, intact BS  Extremities: no pedal edema   Neuro: CN inact, no focal deficits      Total time coordinating care for discharge: Greater than 30 minutes    Josias Vargas MD  [facility]        Electronically signed by Raisa Vargas MD on 4/5/2025  1:17 PM

## 2025-05-31 ENCOUNTER — HOSPITAL ENCOUNTER (EMERGENCY)
Facility: HOSPITAL | Age: 89
Discharge: LEFT BEFORE DISCHARGE | End: 2025-05-31
Payer: MEDICARE

## 2025-06-01 ENCOUNTER — HOSPITAL ENCOUNTER (OUTPATIENT)
Age: 89
Discharge: HOME | End: 2025-06-01
Attending: EMERGENCY MEDICINE
Payer: MEDICARE

## 2025-06-01 ENCOUNTER — APPOINTMENT (OUTPATIENT)
Dept: CT IMAGING | Age: 89
End: 2025-06-01
Attending: EMERGENCY MEDICINE
Payer: MEDICARE

## 2025-06-01 VITALS
HEART RATE: 82 BPM | HEIGHT: 67 IN | RESPIRATION RATE: 18 BRPM | OXYGEN SATURATION: 97 % | WEIGHT: 190 LBS | SYSTOLIC BLOOD PRESSURE: 132 MMHG | TEMPERATURE: 99 F | BODY MASS INDEX: 29.82 KG/M2 | DIASTOLIC BLOOD PRESSURE: 72 MMHG

## 2025-06-01 DIAGNOSIS — E86.0 MILD DEHYDRATION: ICD-10-CM

## 2025-06-01 DIAGNOSIS — R19.7 DIARRHEA, UNSPECIFIED TYPE: ICD-10-CM

## 2025-06-01 DIAGNOSIS — R11.2 NAUSEA AND VOMITING, UNSPECIFIED VOMITING TYPE: Primary | ICD-10-CM

## 2025-06-01 LAB
#MXD IC: 0.5 X10ˆ3/UL (ref 0.1–1)
BILIRUB UR QL STRIP: NEGATIVE
BUN BLD-MCNC: 43 MG/DL (ref 7–18)
CHLORIDE BLD-SCNC: 103 MMOL/L (ref 98–112)
CLARITY UR: CLEAR
CO2 BLD-SCNC: 26 MMOL/L (ref 21–32)
COLOR UR: YELLOW
CREAT BLD-MCNC: 1.4 MG/DL (ref 0.7–1.3)
EGFRCR SERPLBLD CKD-EPI 2021: 48 ML/MIN/1.73M2 (ref 60–?)
GLUCOSE BLD-MCNC: 139 MG/DL (ref 70–99)
GLUCOSE UR STRIP-MCNC: NEGATIVE MG/DL
HCT VFR BLD AUTO: 50.2 % (ref 39–53)
HCT VFR BLD CALC: 53 % (ref 37–53)
HGB BLD-MCNC: 16.7 G/DL (ref 13–17.5)
ISTAT IONIZED CALCIUM FOR CHEM 8: 1.34 MMOL/L (ref 1.12–1.32)
KETONES UR STRIP-MCNC: NEGATIVE MG/DL
LEUKOCYTE ESTERASE UR QL STRIP: NEGATIVE
LYMPHOCYTES # BLD AUTO: 0.3 X10ˆ3/UL (ref 1–4)
LYMPHOCYTES NFR BLD AUTO: 4.7 %
MCH RBC QN AUTO: 31.7 PG (ref 26–34)
MCHC RBC AUTO-ENTMCNC: 33.3 G/DL (ref 31–37)
MCV RBC AUTO: 95.3 FL (ref 80–100)
MIXED CELL %: 6.4 %
NEUTROPHILS # BLD AUTO: 6.4 X10ˆ3/UL (ref 1.5–7.7)
NEUTROPHILS NFR BLD AUTO: 88.9 %
NITRITE UR QL STRIP: NEGATIVE
PH UR STRIP: 5.5 [PH]
PLATELET # BLD AUTO: 218 X10ˆ3/UL (ref 150–450)
POTASSIUM BLD-SCNC: 4.5 MMOL/L (ref 3.6–5.1)
PROT UR STRIP-MCNC: 30 MG/DL
RBC # BLD AUTO: 5.27 X10ˆ6/UL (ref 3.8–5.8)
SODIUM BLD-SCNC: 137 MMOL/L (ref 136–145)
SP GR UR STRIP: 1.01
UROBILINOGEN UR STRIP-ACNC: <2 MG/DL
WBC # BLD AUTO: 7.2 X10ˆ3/UL (ref 4–11)

## 2025-06-01 PROCEDURE — 80047 BASIC METABLC PNL IONIZED CA: CPT

## 2025-06-01 PROCEDURE — 99215 OFFICE O/P EST HI 40 MIN: CPT

## 2025-06-01 PROCEDURE — 96374 THER/PROPH/DIAG INJ IV PUSH: CPT

## 2025-06-01 PROCEDURE — 74177 CT ABD & PELVIS W/CONTRAST: CPT | Performed by: EMERGENCY MEDICINE

## 2025-06-01 PROCEDURE — 81002 URINALYSIS NONAUTO W/O SCOPE: CPT

## 2025-06-01 PROCEDURE — 85025 COMPLETE CBC W/AUTO DIFF WBC: CPT | Performed by: EMERGENCY MEDICINE

## 2025-06-01 RX ORDER — ONDANSETRON 2 MG/ML
4 INJECTION INTRAMUSCULAR; INTRAVENOUS ONCE
Status: COMPLETED | OUTPATIENT
Start: 2025-06-01 | End: 2025-06-01

## 2025-06-01 RX ORDER — ESOMEPRAZOLE MAGNESIUM 40 MG/1
40 CAPSULE, DELAYED RELEASE ORAL
COMMUNITY

## 2025-06-01 RX ORDER — SODIUM CHLORIDE 9 MG/ML
INJECTION, SOLUTION INTRAVENOUS ONCE
Status: COMPLETED | OUTPATIENT
Start: 2025-06-01 | End: 2025-06-01

## 2025-06-01 RX ORDER — ONDANSETRON 4 MG/1
4 TABLET, ORALLY DISINTEGRATING ORAL EVERY 4 HOURS PRN
Qty: 10 TABLET | Refills: 0 | Status: SHIPPED | OUTPATIENT
Start: 2025-06-01 | End: 2025-06-08

## 2025-06-01 NOTE — DISCHARGE INSTRUCTIONS
Encourage fluids and rest at home  Encourage a bland diet at home  Return to the ER if symptoms worsen or if any other problems arise

## 2025-06-01 NOTE — ED PROVIDER NOTES
Patient Seen in: [facility]        History  No chief complaint on file.    Stated Complaint: Diarrhea    Subjective:   HPI            Patient is an 89-year-old male with diffuse abdominal cramping, vomiting and diarrhea which has been ongoing over the last several days.  The patient has had no history of any recent travel no history of any recent antibiotic use.  The patient has had no history of any fever and no history of any ill contacts at home.  The patient complains of diffuse abdominal pain associated with symptoms.  Patient was at the emergency room yesterday but did not want to wait \"7 hours\" to be seen so left without being seen.  The patient complains of ongoing problems this morning with vomiting this morning.  Patient with multiple episodes of loose stools a day.  Patient has had no previous abdominal surgeries.      Objective:     Past Medical History:    Coronary atherosclerosis of unspecified type of vessel, native or graft    Esophageal reflux    High blood pressure    High cholesterol    History of TIA (transient ischemic attack)    Hypotension due to drugs    Kidney stone    Mixed hyperlipidemia    Multiple closed fractures of ribs of right side    Nontraumatic complete tear of left rotator cuff    JAMESON (obstructive sleep apnea)    AHI-33, supine AHI-48, lateral AHI-3/CPAP-10cwp    Other and unspecified personal history of malignant neoplasm    prostate    RLS (restless legs syndrome)    Transient global amnesia    Unspecified essential hypertension    Unspecified transient cerebral ischemia    Vertigo    87 y/o presented with chronic gait disturbance, unsteadiness, dizziness, no vertigo or n/v when asked him in detail. He had almost every two years of brain MRI due to his similar complaints. I have reviewed his brain MRI image with him and his wife which showed no evidence of NPH, but moderate white matter changes.               Past Surgical History:   Procedure Laterality Date     Drug-eluting stents, single      Lumbar discectomy  2021    L4 L5    Other surgical history  08    brachytherapy of prostate    Other surgical history  12    cysto, lt urs, litho, stone extraction of fragments, rpg, lt stent placement- Dr. Braun    Other surgical history  8/3/12    cysto stent removal- Dr. Braun    Other surgical history  1/2/15    Rt hip s/p evacuation of hematoma                Social History     Socioeconomic History    Marital status:    Tobacco Use    Smoking status: Former     Current packs/day: 0.00     Average packs/day: 1 pack/day for 15.0 years (15.0 ttl pk-yrs)     Types: Cigarettes     Start date: 1974     Quit date: 1989     Years since quittin.4    Smokeless tobacco: Former   Vaping Use    Vaping status: Never Used   Substance and Sexual Activity    Alcohol use: Yes     Alcohol/week: 1.0 standard drink of alcohol     Types: 1 Shots of liquor per week     Comment: very socially    Drug use: Never    Sexual activity: Yes     Partners: Female   Social History Narrative    : , second marriage    Children: 3 and 2 step    Exercise: walks, yardwork    Employment: retired    Caffeine intake: 1 coffee/d    Son going thru a divorce. Helping him move. Son moving to [place].      Social Drivers of Health     Food Insecurity: No Food Insecurity (2025)    NCSS - Food Insecurity     Worried About Running Out of Food in the Last Year: No     Ran Out of Food in the Last Year: No   Transportation Needs: No Transportation Needs (2025)    NCSS - Transportation     Lack of Transportation: No   Housing Stability: Not At Risk (2025)    NCSS - Housing/Utilities     Has Housing: Yes     Worried About Losing Housing: No     Unable to Get Utilities: No              Review of Systems    Positive for stated complaint: Diarrhea  Other systems are as noted in HPI.  Constitutional and vital signs reviewed.      All other systems reviewed and negative except as  noted above.                  Physical Exam    ED Triage Vitals [06/01/25 1038]   /64   Pulse 101   Resp 20   Temp 98.9 °F (37.2 °C)   Temp src Oral   SpO2 96 %   O2 Device None (Room air)       Current Vitals:   Vital Signs  BP: 132/72  Pulse: 82  Resp: 18  Temp: 98.9 °F (37.2 °C)  Temp src: Oral    Oxygen Therapy  SpO2: 97 %  O2 Device: None (Room air)            Physical Exam     GENERAL: Well-developed, well-nourished male sitting up breathing easily in no apparent distress.  Patient is nontoxic in appearance.  HEENT: Head is normocephalic, atraumatic. Pupils are 4 mm equally round and reactive to light. Oropharynx is clear. Mucous membranes are dry  NECK: There is no focal tenderness to palpation appreciated. LUNGS: Clear to auscultation bilaterally with no wheeze. There is good equal air entry bilaterally.  HEART: Regular rate and rhythm. Normal S1, S2 no S3, or S4. No murmur.  ABDOMEN: There is mild focal tenderness to palpation appreciated to the mid abdomen only with no other focal tenderness to palpation appreciated. There is no guarding, no rebound, no mass, and no organomegaly appreciated. There is normoactive bowel sounds. There is no hernia.  EXTREMITIES: There is no cyanosis, clubbing, or edema appreciated. Pulses are 2+ and equal in all 4 extremities.  NEURO: Patient is awake, alert and oriented to time place and person. Motor strength is 5 over 5 in all 4 extremities. There are no gross motor or sensory deficits appreciated.  Patient is up and ambulatory in the immediate care with a steady gait.          ED Course  Labs Reviewed   POCT CBC - Abnormal; Notable for the following components:       Result Value    # Lymphocyte 0.3 (*)     All other components within normal limits   POCT ISTAT CHEM8 CARTRIDGE - Abnormal; Notable for the following components:    ISTAT BUN 43 (*)     ISTAT Ionized Calcium 1.34 (*)     ISTAT Glucose 139 (*)     ISTAT Creatinine 1.40 (*)     eGFR-Cr 48 (*)     All  other components within normal limits   [facility] POCT URINALYSIS DIPSTICK - Abnormal; Notable for the following components:    Protein urine 30 (*)     Blood, Urine Trace-Intact (*)     All other components within normal limits     I personally reviewed the patient CT scan of the abdomen pelvis images my individual interpretation shows no evidence of any acute bowel obstruction or free air.  I also reviewed official radiology report which showed results as noted below.     CT ABDOMEN+PELVIS(CONTRAST ONLY)(CPT=74177)  Result Date: 6/1/2025  CONCLUSION:   1.  Fluid distension within the distal small bowel and throughout the colon likely due to infectious enteritis.   2. Diverticulosis of the left colon without diverticulitis.   LOCATION:  [facility]   Dictated by (CST): Rao Ball MD on 6/01/2025 at 12:10 PM     Finalized by (CST): Rao Ball MD on 6/01/2025 at 12:13 PM                         [facility]            Medical Decision Making  12:25 patient feeling better at this time after IV fluids given here in the ER.  Results discussed with the patient and patient's wife at the bedside.  Will continue to observe at this time.  12:55 patient sitting back in breathing easily in no apparent distress he is feeling better after IV fluids given here in the ER.  Will continue to observe at this time.  Patient given a urine specimen at this time  13:25 patient is up and ambulatory in the immediate care with a steady gait and no ataxia.  Patient has tolerated oral fluids well at this time and is eager to go home.  Will discharge to home at this time.      Patient had IV line established blood work drawn including CBC, chemistries, BUN/creatinine, and blood sugar showed evidence of some hemoconcentration with a hemoglobin of 16.7 and increased BUN of 43 indicative of some mild dehydration for which the patient was given IV fluids in the ER.  Patient also given IV Zofran emergency room.  Patient's urinalysis is unremarkable.  Patient  underwent CT scan with results as noted above.  Differential diagnosis considered by myself includes but is not limited to acute bowel obstruction, acute intra-abdominal mass, acute diverticulitis.  There is evidence of enteritis no other acute findings noted on CT scan patient given a liter of IV fluid IV Zofran he was able to tolerate oral fluids well here in the immediate care.  Patient's disposition discussed with the patient and patient's wife at the bedside I did offer to send the patient to the hospital for further observation and possible admission and the patient is refusing and wants to go home at this time.  He is up and ambulatory with a cane here in the ER.  Patient given prescription for Zofran and instructions to encourage fluids and rest at home and monitor symptoms closely at home and return to the ER immediately if symptoms worsen or if any other problems arise.  Patient discharged to home as per patient request at this time    Disposition and Plan     Clinical Impression:  1. Nausea and vomiting, unspecified vomiting type    2. Diarrhea, unspecified type    3. Mild dehydration         Disposition:  Discharge  6/1/2025  1:27 pm    Follow-up:  Deirdre Roldan MD  [address] [address]  [phone]    In 2 days  please call this week for follow up          Medications Prescribed:  Discharge Medication List as of 6/1/2025  1:29 PM        START taking these medications    Details   ondansetron 4 MG Oral Tablet Dispersible Take 1 tablet (4 mg total) by mouth every 4 (four) hours as needed for Nausea., Normal, Disp-10 tablet, R-0                   Supplementary Documentation:

## 2025-06-02 ENCOUNTER — HOSPITAL ENCOUNTER (OUTPATIENT)
Facility: HOSPITAL | Age: 89
Setting detail: OBSERVATION
Discharge: HOME | End: 2025-06-05
Attending: EMERGENCY MEDICINE | Admitting: STUDENT IN AN ORGANIZED HEALTH CARE EDUCATION/TRAINING PROGRAM
Payer: MEDICARE

## 2025-06-02 DIAGNOSIS — E87.20 METABOLIC ACIDOSIS: ICD-10-CM

## 2025-06-02 DIAGNOSIS — R19.7 DIARRHEA OF PRESUMED INFECTIOUS ORIGIN: ICD-10-CM

## 2025-06-02 DIAGNOSIS — N17.9 AKI (ACUTE KIDNEY INJURY): Primary | ICD-10-CM

## 2025-06-02 LAB
ADENOVIRUS F 40/41 PCR: NEGATIVE
ALBUMIN SERPL-MCNC: 4.5 G/DL (ref 3.2–4.8)
ALBUMIN/GLOB SERPL: 1.7 {RATIO} (ref 1–2)
ALP LIVER SERPL-CCNC: 76 U/L (ref 45–117)
ALT SERPL-CCNC: 36 U/L (ref 10–49)
ANION GAP SERPL CALC-SCNC: 11 MMOL/L (ref 0–18)
AST SERPL-CCNC: 32 U/L (ref ?–34)
ASTROVIRUS PCR: NEGATIVE
BASOPHILS # BLD: 0 X10(3) UL (ref 0–0.2)
BASOPHILS NFR BLD: 0 %
BILIRUB SERPL-MCNC: 0.4 MG/DL (ref 0.2–1.1)
BUN BLD-MCNC: 30 MG/DL (ref 9–23)
C CAYETANENSIS DNA SPEC QL NAA+PROBE: NEGATIVE
C DIFF TOX B STL QL: NEGATIVE
CALCIUM BLD-MCNC: 9.8 MG/DL (ref 8.7–10.6)
CAMPY SP DNA.DIARRHEA STL QL NAA+PROBE: POSITIVE
CHLORIDE SERPL-SCNC: 104 MMOL/L (ref 98–112)
CO2 SERPL-SCNC: 18 MMOL/L (ref 21–32)
CREAT BLD-MCNC: 1.5 MG/DL (ref 0.7–1.3)
CRYPTOSP DNA SPEC QL NAA+PROBE: NEGATIVE
EAEC PAA PLAS AGGR+AATA ST NAA+NON-PRB: NEGATIVE
EC STX1+STX2 + H7 FLIC SPEC NAA+PROBE: NEGATIVE
EGFRCR SERPLBLD CKD-EPI 2021: 44 ML/MIN/1.73M2 (ref 60–?)
ENTAMOEBA HISTOLYTICA PCR: NEGATIVE
EOSINOPHIL # BLD: 0 X10(3) UL (ref 0–0.7)
EOSINOPHIL NFR BLD: 0 %
EPEC EAE GENE STL QL NAA+NON-PROBE: POSITIVE
ERYTHROCYTE [DISTWIDTH] IN BLOOD BY AUTOMATED COUNT: 13 %
ETEC LTA+ST1A+ST1B TOX ST NAA+NON-PROBE: NEGATIVE
GIARDIA LAMBLIA PCR: NEGATIVE
GLOBULIN PLAS-MCNC: 2.6 G/DL (ref 2–3.5)
GLUCOSE BLD-MCNC: 139 MG/DL (ref 70–99)
HCT VFR BLD AUTO: 49.8 % (ref 39–53)
HGB BLD-MCNC: 16.6 G/DL (ref 13–17.5)
LYMPHOCYTES NFR BLD: 0.6 X10(3) UL (ref 1–4)
LYMPHOCYTES NFR BLD: 9 %
MCH RBC QN AUTO: 31.4 PG (ref 26–34)
MCHC RBC AUTO-ENTMCNC: 33.3 G/DL (ref 31–37)
MCV RBC AUTO: 94.3 FL (ref 80–100)
MONOCYTES # BLD: 0.54 X10(3) UL (ref 0.1–1)
MONOCYTES NFR BLD: 8 %
MORPHOLOGY: NORMAL
NEUTROPHILS # BLD AUTO: 5.59 X10 (3) UL (ref 1.5–7.7)
NEUTROPHILS NFR BLD: 53 %
NEUTS BAND NFR BLD: 30 %
NEUTS HYPERSEG # BLD: 5.56 X10(3) UL (ref 1.5–7.7)
NOROVIRUS GI/GII PCR: NEGATIVE
OSMOLALITY SERPL CALC.SUM OF ELEC: 284 MOSM/KG (ref 275–295)
P SHIGELLOIDES DNA STL QL NAA+PROBE: NEGATIVE
PLATELET # BLD AUTO: 235 10(3)UL (ref 150–450)
PLATELET MORPHOLOGY: NORMAL
POTASSIUM SERPL-SCNC: 4.5 MMOL/L (ref 3.5–5.1)
PROT SERPL-MCNC: 7.1 G/DL (ref 5.7–8.2)
RBC # BLD AUTO: 5.28 X10(6)UL (ref 3.8–5.8)
ROTAVIRUS A PCR: NEGATIVE
SALMONELLA DNA SPEC QL NAA+PROBE: NEGATIVE
SAPOVIRUS PCR: NEGATIVE
SHIGELLA SP+EIEC IPAH ST NAA+NON-PROBE: NEGATIVE
SODIUM SERPL-SCNC: 133 MMOL/L (ref 136–145)
TOTAL CELLS COUNTED BLD: 100
V CHOLERAE DNA SPEC QL NAA+PROBE: NEGATIVE
VIBRIO DNA SPEC NAA+PROBE: NEGATIVE
WBC # BLD AUTO: 6.7 X10(3) UL (ref 4–11)
YERSINIA DNA SPEC NAA+PROBE: NEGATIVE

## 2025-06-02 PROCEDURE — 87507 IADNA-DNA/RNA PROBE TQ 12-25: CPT | Performed by: EMERGENCY MEDICINE

## 2025-06-02 PROCEDURE — 80053 COMPREHEN METABOLIC PANEL: CPT | Performed by: EMERGENCY MEDICINE

## 2025-06-02 PROCEDURE — 99285 EMERGENCY DEPT VISIT HI MDM: CPT

## 2025-06-02 PROCEDURE — 85027 COMPLETE CBC AUTOMATED: CPT | Performed by: EMERGENCY MEDICINE

## 2025-06-02 PROCEDURE — 96360 HYDRATION IV INFUSION INIT: CPT

## 2025-06-02 PROCEDURE — 87493 C DIFF AMPLIFIED PROBE: CPT | Performed by: EMERGENCY MEDICINE

## 2025-06-02 PROCEDURE — 85007 BL SMEAR W/DIFF WBC COUNT: CPT | Performed by: EMERGENCY MEDICINE

## 2025-06-02 PROCEDURE — 85025 COMPLETE CBC W/AUTO DIFF WBC: CPT | Performed by: EMERGENCY MEDICINE

## 2025-06-02 RX ORDER — ACETAMINOPHEN 325 MG/1
650 TABLET ORAL EVERY 6 HOURS PRN
Status: DISCONTINUED | OUTPATIENT
Start: 2025-06-02 | End: 2025-06-05

## 2025-06-02 RX ORDER — SODIUM CHLORIDE, SODIUM LACTATE, POTASSIUM CHLORIDE, CALCIUM CHLORIDE 600; 310; 30; 20 MG/100ML; MG/100ML; MG/100ML; MG/100ML
INJECTION, SOLUTION INTRAVENOUS CONTINUOUS
Status: DISCONTINUED | OUTPATIENT
Start: 2025-06-02 | End: 2025-06-05

## 2025-06-02 RX ORDER — PRAMIPEXOLE DIHYDROCHLORIDE 0.25 MG/1
0.5 TABLET ORAL NIGHTLY
Status: DISCONTINUED | OUTPATIENT
Start: 2025-06-02 | End: 2025-06-05

## 2025-06-02 RX ORDER — METOCLOPRAMIDE HYDROCHLORIDE 5 MG/ML
5 INJECTION INTRAMUSCULAR; INTRAVENOUS EVERY 8 HOURS PRN
Status: DISCONTINUED | OUTPATIENT
Start: 2025-06-02 | End: 2025-06-05

## 2025-06-02 RX ORDER — SODIUM CHLORIDE 9 MG/ML
1000 INJECTION, SOLUTION INTRAVENOUS ONCE
Status: DISCONTINUED | OUTPATIENT
Start: 2025-06-02 | End: 2025-06-05

## 2025-06-02 RX ORDER — PRAMIPEXOLE DIHYDROCHLORIDE 0.5 MG/1
0.5 TABLET ORAL NIGHTLY
COMMUNITY

## 2025-06-02 RX ORDER — PANTOPRAZOLE SODIUM 40 MG/1
40 TABLET, DELAYED RELEASE ORAL
Status: DISCONTINUED | OUTPATIENT
Start: 2025-06-03 | End: 2025-06-05

## 2025-06-02 RX ORDER — HEPARIN SODIUM 5000 [USP'U]/ML
5000 INJECTION, SOLUTION INTRAVENOUS; SUBCUTANEOUS EVERY 8 HOURS SCHEDULED
Status: DISCONTINUED | OUTPATIENT
Start: 2025-06-02 | End: 2025-06-05

## 2025-06-02 RX ORDER — TRAZODONE HYDROCHLORIDE 50 MG/1
50 TABLET ORAL NIGHTLY
Status: DISCONTINUED | OUTPATIENT
Start: 2025-06-02 | End: 2025-06-05

## 2025-06-02 RX ORDER — ONDANSETRON 2 MG/ML
4 INJECTION INTRAMUSCULAR; INTRAVENOUS EVERY 6 HOURS PRN
Status: DISCONTINUED | OUTPATIENT
Start: 2025-06-02 | End: 2025-06-05

## 2025-06-02 RX ORDER — ASPIRIN 81 MG/1
81 TABLET ORAL DAILY
COMMUNITY

## 2025-06-02 RX ORDER — ROSUVASTATIN CALCIUM 40 MG/1
40 TABLET, COATED ORAL NIGHTLY
Status: DISCONTINUED | OUTPATIENT
Start: 2025-06-02 | End: 2025-06-05

## 2025-06-02 NOTE — H&P
[facility]      Chief Complaint   Patient presents with    Nausea/Vomiting/Diarrhea        PCP: Deirdre Roldan MD      History of Present Illness: Patient is a 89 year old male with PMH sig for CAD s/p PCI, HTN, HLD, JAMESON, TIA who presents for nausea and diarrhea.  Patient reports diarrhea for the past 5 days which has not improved. Since then has become progressively weaker.  Denies vomiting.  No recent travel or sick contacts.    Workup significant for creatinine 1.5, bicarb 18, sodium 133.   CT imaging significant for fluid distention within the distal small bowel and throughout the colon.  Patient given IV fluids in the ED.      Past Medical History[1]   Past Surgical History[2]     ALL:  Allergies[3]     Prior to Admission Medications[4]    Social History     Tobacco Use    Smoking status: Former     Current packs/day: 0.00     Average packs/day: 1 pack/day for 15.0 years (15.0 ttl pk-yrs)     Types: Cigarettes     Start date: 1974     Quit date: 1989     Years since quittin.4    Smokeless tobacco: Former   Substance Use Topics    Alcohol use: Yes     Alcohol/week: 1.0 standard drink of alcohol     Types: 1 Shots of liquor per week     Comment: very socially        Fam Hx  Family History[5]    Review of Systems  Comprehensive ROS reviewed and negative except for what is stated in HPI.      OBJECTIVE:  /68   Pulse 84   Temp 97.4 °F (36.3 °C) (Oral)   Resp 20   SpO2 96%   Physical Exam:  General: Alert, awake, cooperative.  HEENT:  Normocephalic, atraumatic.  Neck:  Trachea midline.  No JVD.   Chest:  Clear to auscultation bilaterally. No wheezes, rales, or rhonchi.  CV:  Regular rate and rhythm.  Positive S1/S2. No murmur, no gallops, no rubs  GI: Bowel sounds present in all four quadrants, abdomen is soft, non-distended.  Extremities:  No lower extremity edema or cyanosis.  Neurological:  AAOx3.  Moving all extremities.  Skin:  Warm and  dry.        Data Review:    LABS:   Lab Results   Component Value Date    WBC 6.7 06/02/2025    HGB 16.6 06/02/2025    HCT 49.8 06/02/2025    .0 06/02/2025    CREATSERUM 1.50 06/02/2025    BUN 30 06/02/2025     06/02/2025    K 4.5 06/02/2025     06/02/2025    CO2 18.0 06/02/2025     06/02/2025    CA 9.8 06/02/2025    ALB 4.5 06/02/2025    ALKPHO 76 06/02/2025    BILT 0.4 06/02/2025    TP 7.1 06/02/2025    AST 32 06/02/2025    ALT 36 06/02/2025       CT: image personally reviewed.   Fluid distention within the distal small bowel and throughout the colon.    Radiology: CT ABDOMEN+PELVIS(CONTRAST ONLY)(CPT=74177)  Result Date: 6/1/2025  PROCEDURE:  CT ABDOMEN+PELVIS (CONTRAST ONLY) (CPT=74177)  COMPARISON:  [facility] , CT, CT ABDOMEN+PELVIS(CONTRAST ONLY)(CPT=74177), 4/04/2025, 1:19 PM.  [facility] , MR, MRI ABDOMEN&MRCP W/3D (CPT=74181/94308), 5/08/2023, 10:23 PM.  [facility] , CT, CT ABDOMEN PELVIS IV CONTRAST, NO ORAL (ER), 5/06/2023, 8:16 PM.  [facility] , CT, CT ABD   PEL W/CONT, 7/20/2012, 3:33 PM.  INDICATIONS:  Diarrhea  TECHNIQUE:  CT scanning was performed from the dome of the diaphragm to the pubic symphysis with non-ionic intravenous contrast material. Post contrast coronal MPR imaging was performed.  Dose reduction techniques were used. Dose information is transmitted to the ACR (American College of Radiology) NRDR (National Radiology Data Registry) which includes the Dose Index Registry.  PATIENT STATED HISTORY:(As transcribed by Technologist)  Patient presents with nausea, vomiting and diarrhea for one week.   CONTRAST USED:  90cc of Isovue 370  FINDINGS:  LIVER:  No enlargement, atrophy, abnormal density, or significant focal lesion.  BILIARY:  No visible dilatation or calcification.  PANCREAS:  No lesion, fluid collection, ductal dilatation, or atrophy.  SPLEEN:  No enlargement or focal lesion.  KIDNEYS:  No mass, obstruction, or calcification.  Multiple cysts are noted with the largest  being 15 mm in the left upper pole of the kidney.  There are tiny cysts in the right kidney measuring 4-5 mm which are simple.  There is no stones or hydronephrosis. ADRENALS:  No mass or enlargement.  AORTA/VASCULAR:  No aneurysm or dissection.  RETROPERITONEUM:  No mass or adenopathy.  BOWEL/MESENTERY:  There is fluid distension throughout the colon.  There is extensive diverticulosis of the sigmoid and descending colon without diverticulitis.  There is also fluid-filled loops of small bowel which are not abnormally enlarged.  There is  no wall thickening or inflammatory change within the mesentery.  Findings most likely related to infectious enteritis.  There is a small hiatal hernia. ABDOMINAL WALL:  Small fat containing inguinal hernias. URINARY BLADDER:  No visible focal wall thickening, lesion, or calculus.  PELVIC NODES:  No adenopathy.  PELVIC ORGANS:  Radiation seeds for treatment of prostate cancer. BONES:  No bony lesion or fracture.  LUNG BASES:  Bandlike atelectasis in the right lower lobe.            CONCLUSION:   1.  Fluid distension within the distal small bowel and throughout the colon likely due to infectious enteritis.   2. Diverticulosis of the left colon without diverticulitis.   LOCATION:  [facility]   Dictated by (CST): Rao Ball MD on 6/01/2025 at 12:10 PM     Finalized by (CST): Rao Ball MD on 6/01/2025 at 12:13 PM          Assessment/Plan:   89 year old male with PMH sig for CAD s/p PCI, HTN, HLD, JAMESON, TIA who presents for nausea and diarrhea.     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  Avoid nephrotoxic agents.  - PT/OT    #GERD  - Continue PPI    #RLS   - Continue pramipexole    #CAD s/p PCI  #Hypertension  #Hyperlipidemia  #TIA, history of  -Continue aspirin, statin    #JAMESON  -CPAP qhs    DVT ppx:  heparin subcu  Code Status: DNR/DNI    D/W ED Physician    Outpatient records or previous hospital records reviewed.   [facility] hospitalist to continue to follow patient while in house  A total of 79 minutes taken with patient and coordinating care.  Greater than 50% face to face encounter.      Advanced Care Planning    While discussing goals of care with the patient and their family, Patient voluntarily participated in an advanced care planning discussion. The following was discussed: The patient would not like CPR/ACLS in the setting of cardiac arrest and would not want endotracheal intubation/mechanical ventilation in the setting of respiratory failure. .    16 minutes were spent in discussing advanced care planning. This time was exclusive of the documented time for this visit.      Melchor Haskins DO  [facility] Hospitalist               [1]   Past Medical History:   Coronary atherosclerosis of unspecified type of vessel, native or graft    Esophageal reflux    High blood pressure    High cholesterol    History of TIA (transient ischemic attack)    Hypotension due to drugs    Kidney stone    Mixed hyperlipidemia    Multiple closed fractures of ribs of right side    Nontraumatic complete tear of left rotator cuff    JAMESON (obstructive sleep apnea)    AHI-33, supine AHI-48, lateral AHI-3/CPAP-10cwp    Other and unspecified personal history of malignant neoplasm    prostate    RLS (restless legs syndrome)    Transient global amnesia    Unspecified essential hypertension    Unspecified transient cerebral ischemia    Vertigo    87 y/o presented with chronic gait disturbance, unsteadiness, dizziness, no vertigo or n/v when asked him in detail. He had almost every two years of brain MRI due to his similar complaints. I have reviewed his brain MRI image with him and his wife which showed no evidence of NPH, but moderate white matter changes.    [2]   Past Surgical History:  Procedure Laterality Date    Drug-eluting  stents, single  1989    Lumbar discectomy  11/01/2021    L4 L5    Other surgical history  5-1-08    brachytherapy of prostate    Other surgical history  7/30/12    cysto, lt urs, litho, stone extraction of fragments, rpg, lt stent placement- Dr. Braun    Other surgical history  8/3/12    cysto stent removal- Dr. Braun    Other surgical history  1/2/15    Rt hip s/p evacuation of hematoma   [3]   Allergies  Allergen Reactions    Dilaudid [Hydromorphone] HALLUCINATION    Hydrocodone HALLUCINATION   [4]   Prior to Admission Medications   Medication Sig    Esomeprazole Magnesium 40 MG Oral Capsule Delayed Release Take 1 capsule (40 mg total) by mouth every morning before breakfast.    ondansetron 4 MG Oral Tablet Dispersible Take 1 tablet (4 mg total) by mouth every 4 (four) hours as needed for Nausea.    rosuvastatin 40 MG Oral Tab Take 1 tablet (40 mg total) by mouth nightly.    Multiple Vitamins-Minerals (EYE MULTIVITAMIN OR) Take 1 tablet by mouth daily.    traZODone 50 MG Oral Tab Take 1 tablet (50 mg total) by mouth nightly.    PRAMIPEXOLE 0.5 MG Oral Tab TAKE 2 TABLETS BY MOUTH  DAILY FOR 1 WEEK THEN 1  TABLET BY MOUTH DAILY (Patient taking differently: Take 1 tablet (0.5 mg total) by mouth at bedtime. TAKE 2 TABLETS BY MOUTH  DAILY FOR 1 WEEK THEN 1  TABLET BY MOUTH DAILY)    Cholecalciferol (VITAMIN D3) 25 MCG (1000 UT) Oral Cap Take 1 tablet by mouth daily.    Acetaminophen (TYLENOL) 325 MG Oral Cap Take by mouth.    Psyllium (METAMUCIL OR) Take 1 capsule by mouth daily.    B Complex Vitamins (VITAMIN B COMPLEX OR) Take 1 tablet by mouth daily.   [5]   Family History  Problem Relation Age of Onset    Cancer Father     Cancer Mother     Heart Disorder Mother     Cancer Sister         esophageal/stomach ca    Cancer Brother         prostate ca    Heart Disorder Brother         cabg    Cancer Sister         breast ca    Cancer Sister         breast ca    Cancer Sister         breast/colon ca

## 2025-06-02 NOTE — PLAN OF CARE
NURSING ADMISSION NOTE      Patient admitted via Cart  Oriented to room.  Safety precautions initiated.  Bed in low position.  Call light in reach.  Alert and oriented x3. Confused at times   RA  CDIFF AND GI stool pannel pending.   Heparin for VTE prophylaxis.   PT/OT to see.  PIV LR infusing 75 ml/hr.   CLD  Electrolyte Cardiac protocol.     1730: md paged for rectal tube orders. Pt buttocks red and excoriated. Pt soiled the bed three times. Watery stools. Rectal tube placed.

## 2025-06-02 NOTE — ED INITIAL ASSESSMENT (HPI)
Pt presents to ER after having \"hourly\" bowel movements that are very watery per patient and persistent weakness. Diarrhea has been ongoing for 2-3 weeks per patient. Pt denies n/v. Denies pain. Pt states he has not been eating much but has been trying to drink fluids.

## 2025-06-02 NOTE — ED PROVIDER NOTES
Patient Seen in: [facility]        History  Chief Complaint   Patient presents with    Nausea/Vomiting/Diarrhea     Stated Complaint:     Subjective:   HPI            Patient for generalized weakness, diarrhea.  States has been having diarrhea hourly for the last several days the diarrhea itself is been going on for more than a week.  No bleeding.  No fevers or chills.  No abdominal pain.  No nausea or vomiting.      Objective:     No pertinent past medical history.            No pertinent past surgical history.              No pertinent social history.                              Physical Exam    ED Triage Vitals [06/02/25 0835]   /68   Pulse 84   Resp 20   Temp 97.4 °F (36.3 °C)   Temp src Oral   SpO2 96 %   O2 Device None (Room air)       Current Vitals:   Vital Signs  BP: 124/68  Pulse: 84  Resp: 20  Temp: 97.4 °F (36.3 °C)  Temp src: Oral    Oxygen Therapy  SpO2: 96 %  O2 Device: None (Room air)            Physical Exam    Physical Exam   Constitutional: Awake, alert, appears clinically dehydrated with dry mucous membranes but not toxic  Head: Normocephalic and atraumatic.   Eyes: Conjunctivae are normal. Pupils are equal, round, and reactive to light.   Neck: Normal range of motion. No JVD  Cardiovascular: Normal rate, regular rhythm  Pulmonary/Chest: Normal effort.  No accessory muscle use.  No cyanosis.  Abdominal: Soft. Not distended.  Benign on exam  Neurological: Pt is alert and oriented to person, place, and time. no cranial nerve deficits. Speech fluent          ED Course  Labs Reviewed   COMP METABOLIC PANEL (14) - Abnormal; Notable for the following components:       Result Value    Glucose 139 (*)     Sodium 133 (*)     CO2 18.0 (*)     BUN 30 (*)     Creatinine 1.50 (*)     eGFR-Cr 44 (*)     All other components within normal limits   CBC WITH DIFFERENTIAL WITH PLATELET   SCAN SLIDE   RAINBOW DRAW LAVENDER   RAINBOW DRAW LIGHT GREEN   RAINBOW DRAW BLUE   C.  DIFFICILE(TOXIGENIC)PCR   GI STOOL PANEL BY PCR          Blood work reviewed notable for SUN, low bicarb    Medications   sodium chloride 0.9 % IV bolus 500 mL (has no administration in time range)   sodium chloride 0.9% infusion 1,000 mL (has no administration in time range)         CBC fine    Stool studies pending                  MDM           Differential diagnoses considered: Dehydration, GI disturbance, C. difficile infection, infectious diarrhea, less likely autoimmune colitis given age.      -Needs admission for dehydration correction of electrolyte abnormalities.  -Stool studies pending    Patient will be admitted primarily to the hospitalsist.          *Discussion of ongoing management of this patient's care included:  admitting physician  *Comorbidities contributing to the complexity of decision making: n/a  *External charts reviewed: n/a  *Additional sources of history: n/a    Shared decision making was done by: patient, myself.      Admission disposition: 6/2/2025  9:54 AM           Medical Decision Making      Disposition and Plan     Clinical Impression:  1. SUN (acute kidney injury)    2. Diarrhea of presumed infectious origin    3. Metabolic acidosis         Disposition:  Admit  6/2/2025  9:54 am    Follow-up:  No follow-up provider specified.        Medications Prescribed:  Current Discharge Medication List                Supplementary Documentation:         Hospital Problems       Present on Admission  Date Reviewed: 12/4/2022          ICD-10-CM Noted POA    * (Principal) SUN (acute kidney injury) N17.9 6/2/2025 Unknown

## 2025-06-03 LAB
ANION GAP SERPL CALC-SCNC: 7 MMOL/L (ref 0–18)
BASOPHILS # BLD AUTO: 0.02 X10(3) UL (ref 0–0.2)
BASOPHILS NFR BLD AUTO: 0.4 %
BUN BLD-MCNC: 27 MG/DL (ref 9–23)
CALCIUM BLD-MCNC: 9 MG/DL (ref 8.7–10.6)
CHLORIDE SERPL-SCNC: 106 MMOL/L (ref 98–112)
CO2 SERPL-SCNC: 22 MMOL/L (ref 21–32)
CREAT BLD-MCNC: 1.16 MG/DL (ref 0.7–1.3)
EGFRCR SERPLBLD CKD-EPI 2021: 60 ML/MIN/1.73M2 (ref 60–?)
EOSINOPHIL # BLD AUTO: 0.05 X10(3) UL (ref 0–0.7)
EOSINOPHIL NFR BLD AUTO: 0.9 %
ERYTHROCYTE [DISTWIDTH] IN BLOOD BY AUTOMATED COUNT: 13.1 %
GLUCOSE BLD-MCNC: 90 MG/DL (ref 70–99)
HCT VFR BLD AUTO: 43.8 % (ref 39–53)
HGB BLD-MCNC: 15 G/DL (ref 13–17.5)
IMM GRANULOCYTES # BLD AUTO: 0.03 X10(3) UL (ref 0–1)
IMM GRANULOCYTES NFR BLD: 0.5 %
LYMPHOCYTES # BLD AUTO: 0.74 X10(3) UL (ref 1–4)
LYMPHOCYTES NFR BLD AUTO: 13.5 %
MAGNESIUM SERPL-MCNC: 1.8 MG/DL (ref 1.6–2.6)
MCH RBC QN AUTO: 31.7 PG (ref 26–34)
MCHC RBC AUTO-ENTMCNC: 34.2 G/DL (ref 31–37)
MCV RBC AUTO: 92.6 FL (ref 80–100)
MONOCYTES # BLD AUTO: 0.66 X10(3) UL (ref 0.1–1)
MONOCYTES NFR BLD AUTO: 12 %
NEUTROPHILS # BLD AUTO: 3.99 X10 (3) UL (ref 1.5–7.7)
NEUTROPHILS # BLD AUTO: 3.99 X10(3) UL (ref 1.5–7.7)
NEUTROPHILS NFR BLD AUTO: 72.7 %
OSMOLALITY SERPL CALC.SUM OF ELEC: 285 MOSM/KG (ref 275–295)
PLATELET # BLD AUTO: 226 10(3)UL (ref 150–450)
POTASSIUM SERPL-SCNC: 3.8 MMOL/L (ref 3.5–5.1)
RBC # BLD AUTO: 4.73 X10(6)UL (ref 3.8–5.8)
SODIUM SERPL-SCNC: 135 MMOL/L (ref 136–145)
WBC # BLD AUTO: 5.5 X10(3) UL (ref 4–11)

## 2025-06-03 PROCEDURE — 85025 COMPLETE CBC W/AUTO DIFF WBC: CPT | Performed by: STUDENT IN AN ORGANIZED HEALTH CARE EDUCATION/TRAINING PROGRAM

## 2025-06-03 PROCEDURE — 83735 ASSAY OF MAGNESIUM: CPT | Performed by: STUDENT IN AN ORGANIZED HEALTH CARE EDUCATION/TRAINING PROGRAM

## 2025-06-03 PROCEDURE — 97165 OT EVAL LOW COMPLEX 30 MIN: CPT

## 2025-06-03 PROCEDURE — 97530 THERAPEUTIC ACTIVITIES: CPT

## 2025-06-03 PROCEDURE — 97161 PT EVAL LOW COMPLEX 20 MIN: CPT

## 2025-06-03 PROCEDURE — 80048 BASIC METABOLIC PNL TOTAL CA: CPT | Performed by: STUDENT IN AN ORGANIZED HEALTH CARE EDUCATION/TRAINING PROGRAM

## 2025-06-03 RX ORDER — MAGNESIUM OXIDE 400 MG/1
400 TABLET ORAL ONCE
Status: COMPLETED | OUTPATIENT
Start: 2025-06-03 | End: 2025-06-03

## 2025-06-03 RX ORDER — AZITHROMYCIN 250 MG/1
500 TABLET, FILM COATED ORAL
Status: COMPLETED | OUTPATIENT
Start: 2025-06-03 | End: 2025-06-04

## 2025-06-03 RX ORDER — ASPIRIN 81 MG/1
81 TABLET ORAL DAILY
Status: DISCONTINUED | OUTPATIENT
Start: 2025-06-03 | End: 2025-06-05

## 2025-06-03 RX ORDER — POTASSIUM CHLORIDE 1500 MG/1
40 TABLET, EXTENDED RELEASE ORAL ONCE
Status: COMPLETED | OUTPATIENT
Start: 2025-06-03 | End: 2025-06-03

## 2025-06-03 NOTE — PROGRESS NOTES
[facility] Hospitalist Progress Note     PCP: Deirdre Roldan MD    SUBJECTIVE:  Still having significant diarrhea.  Denies chest pain or dyspnea.    OBJECTIVE:  Temp:  [96.8 °F (36 °C)-99 °F (37.2 °C)] 97.6 °F (36.4 °C)  Pulse:  [69-87] 87  Resp:  [16-18] 16  BP: (104-124)/(53-57) 121/53  SpO2:  [92 %-96 %] 93 %    Intake/Output:    Intake/Output Summary (Last 24 hours) at 6/3/2025 1512  Last data filed at 6/3/2025 1219  Gross per 24 hour   Intake 729 ml   Output 2350 ml   Net -1621 ml       Last 3 Weights   06/02/25 1241 180 lb (81.6 kg)   06/01/25 1038 190 lb (86.2 kg)   04/04/25 1937 191 lb 2.2 oz (86.7 kg)   04/04/25 1933 191 lb 2.2 oz (86.7 kg)   04/04/25 1026 190 lb (86.2 kg)       Exam  Physical Exam:  General: Alert, awake, cooperative.  HEENT:  Normocephalic, atraumatic.  Neck:  Trachea midline.  No JVD.   Chest:  Clear to auscultation bilaterally. No wheezes, rales, or rhonchi.  CV:  Regular rate and rhythm.  Positive S1/S2. No murmur, no gallops, no rubs  GI: Bowel sounds present in all four quadrants, abdomen is soft, non-distended.  Extremities:  No lower extremity edema or cyanosis.  Neurological:  AAOx3.  Moving all extremities.  Skin:  Warm and dry.         Data Review:       Labs:     Recent Labs   Lab 06/01/25  1118 06/02/25  0835 06/03/25  0620   WBC  --  6.7 5.5   HGB  --  16.6 15.0   MCV 95.3 94.3 92.6   PLT  --  235.0 226.0   BAND  --  30  --        Recent Labs   Lab 06/02/25  0835 06/03/25  0620   * 135*   K 4.5 3.8    106   CO2 18.0* 22.0   BUN 30* 27*   CREATSERUM 1.50* 1.16   CA 9.8 9.0   MG  --  1.8   * 90       Recent Labs   Lab 06/02/25  0835   ALT 36   AST 32   ALB 4.5       No results for input(s): \"PGLU\" in the last 168 hours.    No results for input(s): \"TROP\" in the last 168 hours.        Meds:     Scheduled Medications[1]  Medication Infusions[2]  PRN Medications[3]       Assessment/Plan:   89 year old male with PMH sig for CAD s/p PCI, HTN, HLD, JAMESON, TIA who  presents for nausea and diarrhea.      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 Stool pathogen panel positive for EPEC and Campylobacter.  -3-day course p.o. azithromycin (6/2-) given high risk patient  -Continue IVF  -Antimemetics  -Advance diet as tolerated  -Trend renal function.  Avoid nephrotoxic agents.  - PT/OT     #GERD  - Continue PPI     #RLS   - Continue pramipexole     #CAD s/p PCI  #Hypertension  #Hyperlipidemia  #TIA, history of  -Continue aspirin, statin     #JAMESON  -CPAP qhs     DVT ppx: heparin subcu  Code Status: DNR/DNI    D/W TIBURCIO Haskins [facility] Hospitalist         [1]    aspirin  81 mg Oral Daily    azithromycin  500 mg Oral Daily    sodium chloride  1,000 mL Intravenous Once    pantoprazole  40 mg Oral QAM AC    rosuvastatin  40 mg Oral Nightly    traZODone  50 mg Oral Nightly    pramipexole  0.5 mg Oral Nightly    heparin  5,000 Units Subcutaneous Q8H [facility]   [2]    lactated ringers 75 mL/hr at 06/03/25 0137   [3]   acetaminophen    ondansetron    metoclopramide

## 2025-06-03 NOTE — PLAN OF CARE
6/3 AM: Pt is A/O x 4, hard of hearing. Lung sounds are clear, on room air. BP and HR are WNL, NSR on tele. Multiple loose bowel movements, rectal tube in place. Full liquid diet, up 1 with walker. 0.9 going at 75 mL/hr.    Problem: Patient/Family Goals  Goal: Patient/Family Long Term Goal  Description: Patient's Long Term Goal: go home    Interventions:  - See additional Care Plan goals for specific interventions  Outcome: Progressing  Goal: Patient/Family Short Term Goal  Description: Patient's Short Term Goal: sleep, feel better  6/3 AM: Monitor stool    Interventions:  - See additional Care Plan goals for specific interventions  Outcome: Progressing

## 2025-06-03 NOTE — PLAN OF CARE
Received pt A&Ox4, forgetful at times.  on RA. NSR on tele. Heparin for VTE prophylaxis. Tolerating clear liquid diet. Voids via urinal. Flexiseal in place. Isolation precautions for GI panel results. PO azithro. Up with assistance. Denies Pain. Plan of care continues, no further needs at this time.     Problem: Patient/Family Goals  Goal: Patient/Family Long Term Goal  Description: Patient's Long Term Goal: go home    Interventions:  - See additional Care Plan goals for specific interventions  Outcome: Progressing  Goal: Patient/Family Short Term Goal  Description: Patient's Short Term Goal: sleep, feel better    Interventions:  - See additional Care Plan goals for specific interventions  Outcome: Progressing     Problem: GASTROINTESTINAL - ADULT  Goal: Maintains or returns to baseline bowel function  Description: INTERVENTIONS:  - Assess bowel function  - Maintain adequate hydration with IV or PO as ordered and tolerated  - Evaluate effectiveness of GI medications  - Encourage mobilization and activity  - Obtain nutritional consult as needed  - Establish a toileting routine/schedule  - Consider collaborating with pharmacy to review patient's medication profile  Outcome: Progressing

## 2025-06-03 NOTE — PHYSICAL THERAPY NOTE
PHYSICAL THERAPY EVALUATION - INPATIENT     Room Number: 510/510-A  Evaluation Date: 6/3/2025  Type of Evaluation: Initial  Physician Order: PT Eval and Treat    Presenting Problem: gastroenteritis, diarrhea  Co-Morbidities : HTN, TIA, RLS,hx of rib fractures, vertigo, spine surgery  Reason for Therapy: Mobility Dysfunction and Discharge Planning    PHYSICAL THERAPY ASSESSMENT   Patient is a 89 year old male admitted 6/2/2025 for gastroenteritis and diarrhea.  Prior to admission, patient's baseline is independent with no AD.  Patient is currently functioning below baseline with bed mobility, transfers, gait, and stair negotiation.  Patient is requiring supervision and contact guard assist as a result of the following impairments: decreased functional strength, decreased endurance/aerobic capacity, impaired   balance, and decreased muscular endurance.  Physical Therapy will continue to follow for duration of hospitalization.    Patient will benefit from continued skilled PT Services for duration of hospitalization, however, given the patient is functioning near baseline level do not anticipate skilled therapy needs at discharge .    PLAN DURING HOSPITALIZATION  Nursing Mobility Recommendation : 1 Assist  PT Device Recommendation: Rolling walker  PT Treatment Plan: Bed mobility, Endurance, Energy conservation, Patient education, Family education, Gait training, Strengthening, Transfer training, Balance training  Rehab Potential : Good  Frequency (Obs): 3-5x/week     CURRENT GOALS    Goal #1 Patient is able to demonstrate supine - sit EOB @ level: supervision     Goal #2 Patient is able to demonstrate transfers Sit to/from Stand at assistance level: supervision     Goal #3 Patient is able to ambulate 150 feet with assist device: walker - rolling at assistance level: supervision     Goal #4    Goal #5    Goal #6    Goal Comments: Goals established on 6/3/2025      PHYSICAL THERAPY MEDICAL/SOCIAL HISTORY  History related  to current admission: Patient is a 89 year old male admitted on 6/2/2025 from home for gastroenteritis and diarrhea.    HOME SITUATION  Type of Home: House  Home Layout: One level (with basement)  Stairs to Enter : 1        Stairs to Bedroom: 0         Lives With: Spouse    Drives: Yes   Patient Regularly Uses:  (has a cane and RW)      Prior Level of [place]: Pt is typically independent with ADLs and ambulates with no AD.     SUBJECTIVE  Pt pleasant and cooperative during session    OBJECTIVE  Precautions: Bed/chair alarm, Rectal tube, Hard of hearing  Fall Risk: Standard fall risk    WEIGHT BEARING RESTRICTION     PAIN ASSESSMENT  Rating: Unable to rate  Location: abdomen  Management Techniques: Activity promotion, Relaxation, Repositioning    COGNITION  Overall Cognitive Status:  WFL - within functional limits    RANGE OF MOTION AND STRENGTH ASSESSMENT  Upper extremity ROM and strength are within functional limits     Lower extremity ROM is within functional limits     Lower extremity strength is within functional limits     BALANCE  Static Sitting: Fair +  Dynamic Sitting: Fair +  Static Standing: Fair -  Dynamic Standing: Fair -    ADDITIONAL TESTS                                    ACTIVITY TOLERANCE   Vitals stable                      O2 WALK       NEUROLOGICAL FINDINGS                        AM-PAC '6-Clicks' INPATIENT SHORT FORM - BASIC MOBILITY  How much difficulty does the patient currently have...  Patient Difficulty: Turning over in bed (including adjusting bedclothes, sheets and blankets)?: None   Patient Difficulty: Sitting down on and standing up from a chair with arms (e.g., wheelchair, bedside commode, etc.): A Little   Patient Difficulty: Moving from lying on back to sitting on the side of the bed?: A Little   How much help from another person does the patient currently need...   Help from Another: Moving to and from a bed to a chair (including a wheelchair)?: A Little   Help from Another:  Need to walk in hospital room?: A Little   Help from Another: Climbing 3-5 steps with a railing?: A Little     AM-PAC Score:  Raw Score: 19   Approx Degree of Impairment: 41.77%   Standardized Score (AM-PAC Scale): 45.44   CMS Modifier (G-Code): CK    FUNCTIONAL ABILITY STATUS  Gait Assessment   Functional Mobility/Gait Assessment  Gait Assistance: Contact guard assist, Supervision  Distance (ft): 50  Assistive Device: Rolling walker  Pattern: Within Functional Limits    Skilled Therapy Provided: Per RN okay to work with pt. Pt received in supine and was agreeable to PT session.     Bed Mobility:  Rolling: NT  Supine to sit: min A    Sit to supine: NT     Transfer Mobility:  Sit to stand: CGA   Stand to sit: CGA  Gait = pt ambulated with RW and CGA that progressed to supervision    Therapist's Comments: pt educated on role of therapy, goals for session, safety, fall prevention, and activity recommendations.     Exercise/Education Provided:  Bed mobility  Energy conservation  Functional activity tolerated  Gait training  Posture  Strengthening  Transfer training    Patient End of Session: Up in chair, Needs met, Call light within reach, RN aware of session/findings, All patient questions and concerns addressed, Hospital anti-slip socks, Alarm set, Family present (waffle cusion)      Patient Evaluation Complexity Level:  History Moderate - 1 or 2 personal factors and/or co-morbidities   Examination of body systems Low -  addressing 1-2 elements   Clinical Presentation Low- Stable   Clinical Decision Making Low Complexity       PT Session Time: 23 minutes  Therapeutic Activity: 8 minutes

## 2025-06-03 NOTE — OCCUPATIONAL THERAPY NOTE
OCCUPATIONAL THERAPY EVALUATION - INPATIENT     Room Number: 510/510-A  Evaluation Date: 6/3/2025  Type of Evaluation: Initial  Presenting Problem: SUN, diarrhea    Physician Order: IP Consult to Occupational Therapy  Reason for Therapy: ADL/IADL Dysfunction and Discharge Planning    OCCUPATIONAL THERAPY ASSESSMENT   Patient is currently functioning near baseline with toileting, bathing, lower body dressing, bed mobility, and transfers. Prior to admission, patient's baseline is mod indep, sometimes uses a cane and is occasionally assisted by spouse for donning/doffing socks and buttoning shirts.  Patient is requiring minimum assistance as a result of the following impairments: decreased functional strength, decreased endurance, decreased muscular endurance, and medical status. Occupational Therapy will continue to follow for duration of hospitalization.    Patient will benefit from continued skilled OT Services during admission; likely with no additional skilled services but increased support at home    History Related to Current Admission: Patient is a 89 year old male admitted on 6/2/2025 with Presenting Problem: SUN, diarrhea. Co-Morbidities : HTN, TIA, RLS,hx of rib fractures, vertigo, spine surgery    WEIGHT BEARING RESTRICTION       Recommendations for nursing staff:   Transfers: x1 with walker  Toileting location: bathroom    EVALUATION SESSION:  Patient Start of Session: supine bed    FUNCTIONAL TRANSFER ASSESSMENT  Sit to Stand: Edge of Bed  Edge of Bed: Contact Guard Assist  Toilet Transfer: Contact Guard Assist    BED MOBILITY  Supine to Sit : Minimal Assist    BALANCE ASSESSMENT     FUNCTIONAL ADL ASSESSMENT  LB Dressing Seated: Moderate Assist  Toileting Standing: Moderate Assist    ACTIVITY TOLERANCE: fair                         O2 SATURATIONS       COGNITION  Overall Cognitive Status:  WFL - within functional limits    Upper Extremity   ROM: within functional limits   Strength: within functional  limits   Coordination  Gross motor:   Fine motor:   Sensation:     EDUCATION PROVIDED  Patient Education : Role of Occupational Therapy; Plan of Care; Functional Transfer Techniques; Posture/Positioning; Energy Conservation; Proper Body Mechanics  Patient's Response to Education: Verbalized Understanding    Equipment used: walker  Demonstrates functional use, Would benefit from additional trial      Therapist comments: Pt received supine in bed, Corrine for sup to sit bed mobility. CGA sit to stand tx with verbal cues for hand placement, able to ambulate bathroom distance with CGA and walker. Demos appropriate balance for standing ADL tasks, unable to fully assess due to flexiseal in place. Will cont to follow    Patient End of Session: Up in chair, Needs met, Call light within reach, All patient questions and concerns addressed, Alarm set, Hospital anti-slip socks    OCCUPATIONAL PROFILE    HOME SITUATION  Type of Home: House  Home Layout: One level (with basement)  Lives With: Spouse    Toilet and Equipment: Standard height toilet  Shower/Tub and Equipment: Walk-in shower, Grab bar             Drives: Yes  Patient Regularly Uses: Cane    Prior Level of Function: mod indep with cane mobility. Spouse assists with socks and buttons    SUBJECTIVE   Pleasant and cooperative     PAIN ASSESSMENT  [place]          OBJECTIVE  Precautions: Bed/chair alarm  Fall Risk: Standard fall risk    ASSESSMENTS    AM-PAC ‘6-Clicks’ Inpatient Daily Activity Short Form  -   Putting on and taking off regular lower body clothing?: A Lot  -   Bathing (including washing, rinsing, drying)?: A Lot  -   Toileting, which includes using toilet, bedpan or urinal? : A Lot  -   Putting on and taking off regular upper body clothing?: A Little  -   Taking care of personal grooming such as brushing teeth?: A Little  -   Eating meals?: None    AM-PAC Score:  Score: 16  Approx Degree of Impairment: 53.32%  Standardized Score (AM-PAC Scale):  35.96    ADDITIONAL TESTS     NEUROLOGICAL FINDINGS      COGNITION ASSESSMENTS     PLAN     OT Treatment Plan: Balance activities, Energy conservation/work simplification techniques, ADL training, Functional transfer training, Patient/Family education, Patient/Family training, Compensatory technique education, Endurance training  Rehab Potential : Good  Frequency: 3x/week  Number of Visits to Meet Established Goals: 3    ADL Goals   Patient will perform grooming: with supervision and while standing at sink  Patient will perform toileting: with supervision    Functional Transfer Goals  Patient will transfer to toilet:  with supervision      Patient Evaluation Complexity Level:   Occupational Profile/Medical History LOW - Brief history including review of medical or therapy records    Specific performance deficits impacting engagement in ADL/IADL LOW  1 - 3 performance deficits    Client Assessment/Performance Deficits LOW - No comorbidities nor modifications of tasks    Clinical Decision Making LOW - Analysis of occupational profile, problem-focused assessments, limited treatment options    Overall Complexity LOW     OT Session Time: 25 minutes  Self-Care Home Management:  minutes  Therapeutic Activity: 15 minutes  Neuromuscular Re-education:  minutes  Therapeutic Exercise:  minutes  Cognitive Skills:  minutes  Sensory Integrative:  minutes  Orthotic Management and Training:  minutes  Can add/delete any of these

## 2025-06-04 LAB
ANION GAP SERPL CALC-SCNC: 9 MMOL/L (ref 0–18)
BASOPHILS # BLD AUTO: 0.02 X10(3) UL (ref 0–0.2)
BASOPHILS NFR BLD AUTO: 0.4 %
BUN BLD-MCNC: 19 MG/DL (ref 9–23)
CALCIUM BLD-MCNC: 8.7 MG/DL (ref 8.7–10.6)
CHLORIDE SERPL-SCNC: 106 MMOL/L (ref 98–112)
CO2 SERPL-SCNC: 23 MMOL/L (ref 21–32)
CREAT BLD-MCNC: 0.96 MG/DL (ref 0.7–1.3)
EGFRCR SERPLBLD CKD-EPI 2021: 76 ML/MIN/1.73M2 (ref 60–?)
EOSINOPHIL # BLD AUTO: 0.04 X10(3) UL (ref 0–0.7)
EOSINOPHIL NFR BLD AUTO: 0.7 %
ERYTHROCYTE [DISTWIDTH] IN BLOOD BY AUTOMATED COUNT: 13 %
GLUCOSE BLD-MCNC: 94 MG/DL (ref 70–99)
HCT VFR BLD AUTO: 41.1 % (ref 39–53)
HGB BLD-MCNC: 14.1 G/DL (ref 13–17.5)
IMM GRANULOCYTES # BLD AUTO: 0.04 X10(3) UL (ref 0–1)
IMM GRANULOCYTES NFR BLD: 0.7 %
LYMPHOCYTES # BLD AUTO: 0.72 X10(3) UL (ref 1–4)
LYMPHOCYTES NFR BLD AUTO: 13 %
MAGNESIUM SERPL-MCNC: 2 MG/DL (ref 1.6–2.6)
MCH RBC QN AUTO: 31.8 PG (ref 26–34)
MCHC RBC AUTO-ENTMCNC: 34.3 G/DL (ref 31–37)
MCV RBC AUTO: 92.8 FL (ref 80–100)
MONOCYTES # BLD AUTO: 0.63 X10(3) UL (ref 0.1–1)
MONOCYTES NFR BLD AUTO: 11.4 %
NEUTROPHILS # BLD AUTO: 4.1 X10 (3) UL (ref 1.5–7.7)
NEUTROPHILS # BLD AUTO: 4.1 X10(3) UL (ref 1.5–7.7)
NEUTROPHILS NFR BLD AUTO: 73.8 %
OSMOLALITY SERPL CALC.SUM OF ELEC: 288 MOSM/KG (ref 275–295)
PLATELET # BLD AUTO: 212 10(3)UL (ref 150–450)
POTASSIUM SERPL-SCNC: 4 MMOL/L (ref 3.5–5.1)
POTASSIUM SERPL-SCNC: 4 MMOL/L (ref 3.5–5.1)
RBC # BLD AUTO: 4.43 X10(6)UL (ref 3.8–5.8)
SODIUM SERPL-SCNC: 138 MMOL/L (ref 136–145)
WBC # BLD AUTO: 5.6 X10(3) UL (ref 4–11)

## 2025-06-04 PROCEDURE — 80048 BASIC METABOLIC PNL TOTAL CA: CPT | Performed by: STUDENT IN AN ORGANIZED HEALTH CARE EDUCATION/TRAINING PROGRAM

## 2025-06-04 PROCEDURE — 85025 COMPLETE CBC W/AUTO DIFF WBC: CPT | Performed by: STUDENT IN AN ORGANIZED HEALTH CARE EDUCATION/TRAINING PROGRAM

## 2025-06-04 PROCEDURE — 84132 ASSAY OF SERUM POTASSIUM: CPT | Performed by: STUDENT IN AN ORGANIZED HEALTH CARE EDUCATION/TRAINING PROGRAM

## 2025-06-04 PROCEDURE — 83735 ASSAY OF MAGNESIUM: CPT | Performed by: STUDENT IN AN ORGANIZED HEALTH CARE EDUCATION/TRAINING PROGRAM

## 2025-06-04 NOTE — PAYOR COMM NOTE
--------------  ADMISSION REVIEW     Payor: [employer]  Subscriber #:  [id number]  Authorization Number: [id number]    Admit date: N/A  Admit time: N/A       REVIEW DOCUMENTATION:  ED Provider Notes signed by Gutierrez Malhotra MD at 6/2/2025  9:57 AM       Author: Gutierrez Malhotra MD Service: Emergency Medicine Author Type: Physician    Filed: 6/2/2025  9:57 AM Date of Service: 6/2/2025  9:55 AM Status: Signed     Patient Seen in: [facility] Emergency Department    History  Chief Complaint   Patient presents with    Nausea/Vomiting/Diarrhea     HPI  Patient for generalized weakness, diarrhea.  States has been having diarrhea hourly for the last several days the diarrhea itself is been going on for more than a week.  No bleeding.  No fevers or chills.  No abdominal pain.  No nausea or vomiting.    Physical Exam  ED Triage Vitals [06/02/25 0835]   /68   Pulse 84   Resp 20   Temp 97.4 °F (36.3 °C)   Temp src Oral   SpO2 96 %   O2 Device None (Room air)     Vital Signs  BP: 124/68  Pulse: 84  Resp: 20  Temp: 97.4 °F (36.3 °C)  Temp src: Oral    Oxygen Therapy  SpO2: 96 %  O2 Device: None (Room air)    Physical Exam  Constitutional: Awake, alert, appears clinically dehydrated with dry mucous membranes but not toxic  Head: Normocephalic and atraumatic.   Eyes: Conjunctivae are normal. Pupils are equal, round, and reactive to light.   Neck: Normal range of motion. No JVD  Cardiovascular: Normal rate, regular rhythm  Pulmonary/Chest: Normal effort.  No accessory muscle use.  No cyanosis.  Abdominal: Soft. Not distended.  Benign on exam  Neurological: Pt is alert and oriented to person, place, and time. no cranial nerve deficits. Speech fluent    ED Course  Labs Reviewed   COMP METABOLIC PANEL (14) - Abnormal; Notable for the following components:       Result Value    Glucose 139 (*)     Sodium 133 (*)     CO2 18.0 (*)     BUN 30 (*)     Creatinine 1.50 (*)     eGFR-Cr 44 (*)     All other components within normal  limits   CBC WITH DIFFERENTIAL WITH PLATELET   SCAN SLIDE   C. DIFFICILE(TOXIGENIC)PCR   GI STOOL PANEL BY PCR     Blood work reviewed notable for SUN, low bicarb    Medications   sodium chloride 0.9 % IV bolus 500 mL (has no administration in time range)   sodium chloride 0.9% infusion 1,000 mL (has no administration in time range)     CBC fine  Stool studies pending    MDM    Differential diagnoses considered: Dehydration, GI disturbance, C. difficile infection, infectious diarrhea, less likely autoimmune colitis given age.    -Needs admission for dehydration correction of electrolyte abnormalities.  -Stool studies pending    Patient will be admitted primarily to the [facility] hospitalist.    *Discussion of ongoing management of this patient's care included:  admitting physician  *Comorbidities contributing to the complexity of decision making: n/a  *External charts reviewed: n/a  *Additional sources of history: n/a    Shared decision making was done by: patient, myself.    Admission disposition: 6/2/2025  9:54 AM  Medical Decision Making  Disposition and Plan     Clinical Impression:  1. SUN (acute kidney injury)    2. Diarrhea of presumed infectious origin    3. Metabolic acidosis      Disposition:  Admit  6/2/2025  9:54 am    Gutierrez Malhotra MD on 6/2/2025  9:57 AM      Hospitalist History and Physical   Chief Complaint   Patient presents with    Nausea/Vomiting/Diarrhea      History of Present Illness: Patient is a 89 year old male with PMH sig for CAD s/p PCI, HTN, HLD, JAMESON, TIA who presents for nausea and diarrhea.  Patient reports diarrhea for the past 5 days which has not improved. Since then has become progressively weaker.  Denies vomiting.  No recent travel or sick contacts.     Workup significant for creatinine 1.5, bicarb 18, sodium 133.   CT imaging significant for fluid distention within the distal small bowel and throughout the colon.  Patient given IV fluids in the ED.    1730: md paged for rectal tube  orders. Pt buttocks red and excoriated. Pt soiled the bed three times. Watery stools. Rectal tube placed     Component Value Date     WBC 6.7 06/02/2025     HGB 16.6 06/02/2025     HCT 49.8 06/02/2025     .0 06/02/2025     CREATSERUM 1.50 06/02/2025     BUN 30 06/02/2025      06/02/2025     K 4.5 06/02/2025      06/02/2025     CO2 18.0 06/02/2025      06/02/2025     CA 9.8 06/02/2025     ALB 4.5 06/02/2025     ALKPHO 76 06/02/2025     BILT 0.4 06/02/2025     TP 7.1 06/02/2025     AST 32 06/02/2025     ALT 36 06/02/2025     Assessment/Plan:   89 year old male with PMH sig for CAD s/p PCI, HTN, HLD, JAMESON, TIA who presents for nausea and diarrhea.      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  Avoid nephrotoxic agents.  - PT/OT     #GERD  - Continue PPI     #RLS   - Continue pramipexole     #CAD s/p PCI  #Hypertension  #Hyperlipidemia  #TIA, history of  -Continue aspirin, statin     #JAMESON  -CPAP qhs     DVT ppx: heparin subcu    6/3/2025  Hospitalist Progress Note   Still having significant diarrhea. Denies chest pain or dyspnea.      6/3/2025 1219      Gross per 24 hour   Intake 729 ml   Output 2350 ml   Net -1621 ml     Lab 06/01/25  1118 06/02/25  0835 06/03/25  0620   WBC  --  6.7 5.5   HGB  --  16.6 15.0   MCV 95.3 94.3 92.6   PLT  --  235.0 226.0   BAND  --  30  --      Lab 06/02/25  0835 06/03/25  0620   * 135*   K 4.5 3.8    106   CO2 18.0* 22.0   BUN 30* 27*   CREATSERUM 1.50* 1.16   CA 9.8 9.0   MG  --  1.8   * 90     Assessment/Plan:      #Diarrhea suspect 2/2 gastroenteritis  - Has Rectal tube in place  #SUN on CKDII, resolved  #NAGMA, resolved  #Hyponatremia   #Generalized weakness  -Check c diff pcr negative. Stool pathogen panel positive for EPEC and Campylobacter.  -3-day course  p.o. azithromycin (6/2-) given high risk patient  -Continue IVF  -Antimemetics  -Advance diet as tolerated  -Trend renal function.  Avoid nephrotoxic agents.  - PT/OT     #GERD  - Continue PPI     #RLS   - Continue pramipexole     #CAD s/p PCI  #Hypertension  #Hyperlipidemia  #TIA, history of  -Continue aspirin, statin     #JAMESON  -CPAP qhs     DVT ppx: heparin subcu    MEDICATIONS ADMINISTERED:  aspirin DR tab 81 mg       Date Action Dose Route User    6/3/2025 0734 Given 81 mg Oral Uri Owen RN          azithromycin (Zithromax) tab 500 mg       Date Action Dose Route User    6/3/2025 2100 Given 500 mg Oral Norah Ziegler RN          heparin (Porcine) 5000 UNIT/ML injection 5,000 Units       Date Action Dose Route User    6/4/2025 0607 Given 5,000 Units Subcutaneous (Right Lower Abdomen) Norah Ziegler RN    6/3/2025 2100 Given 5,000 Units Subcutaneous (Left Lower Abdomen) Norah Ziegler RN    6/3/2025 1454 Given 5,000 Units Subcutaneous (Left Lower Abdomen) Uri Owen RN          lactated ringers infusion       Date Action Dose Route User    6/3/2025 2108 New Bag (none) Intravenous Norah Ziegler RN          magnesium oxide (Mag-Ox) tab 400 mg       Date Action Dose Route User    6/3/2025 0734 Given 400 mg Oral Uri Owen RN          pantoprazole (Protonix) DR tab 40 mg       Date Action Dose Route User    6/4/2025 0607 Given 40 mg Oral Norah Ziegler RN          potassium chloride (Klor-Con M20) tab 40 mEq       Date Action Dose Route User    6/3/2025 0734 Given 40 mEq Oral Uri Owen RN          pramipexole (Mirapex) tab 0.5 mg       Date Action Dose Route User    6/3/2025 2100 Given 0.5 mg Oral Norah Ziegler RN          rosuvastatin (Crestor) tab 40 mg       Date Action Dose Route User    6/3/2025 2100 Given 40 mg Oral Norah Ziegler RN          traZODone (Desyrel) tab 50 mg       Date Action Dose Route  User    6/3/2025 2100 Given 50 mg Oral Norah Ziegler, TIBURCIO        Date/Time Temp Pulse Resp BP SpO2 Weight O2 Device O2 Flow Rate (L/min) Who    06/04/25 0544 98 °F (36.7 °C) 62 18 112/55 95 % -- -- -- DS    06/04/25 0034 98.6 °F (37 °C) 67 20 119/51 97 % -- -- -- DS    06/03/25 1952 98.4 °F (36.9 °C) 77 18 126/58 -- -- None (Room air) -- DS    06/03/25 1700 97.8 °F (36.6 °C) 72 18 134/56 -- -- -- -- IS    06/03/25 1219 -- 87 -- -- -- -- -- -- IS    06/03/25 1143 97.6 °F (36.4 °C) 72 16 121/53 93 % -- None (Room air) -- IS    06/03/25 0831 97.6 °F (36.4 °C) 83 17 118/53 93 % -- None (Room air) -- IS    06/03/25 0610 96.8 °F (36 °C) 69 16 124/57 94 % -- None (Room air) -- ZS    06/03/25 0003 98.3 °F (36.8 °C) 73 17 117/53 92 % -- None (Room air) --          FOR REVIEW/APPROVAL OF INPT ADMISSION

## 2025-06-04 NOTE — PLAN OF CARE
Pt is A&O x 4. [place]. VSS, afebrile. SPO2 maintained on RA. NSR on tele. Tolerating soft/ low fiber diet. Rectal tube, urinal. Up x1 with walker. no pain. PIV IVF. Patient updated on poc, no further needs at this time. Safety precautions in place.     Problem: Patient/Family Goals  Goal: Patient/Family Long Term Goal  Description: Patient's Long Term Goal: go home    Interventions:  - See additional Care Plan goals for specific interventions  Outcome: Progressing  Goal: Patient/Family Short Term Goal  Description: Patient's Short Term Goal: sleep, feel better  6/3 AM: Monitor stool  6/3 NOC: sleep, rest, monitor stool    Interventions:  - See additional Care Plan goals for specific interventions  Outcome: Progressing     Problem: GASTROINTESTINAL - ADULT  Goal: Maintains or returns to baseline bowel function  Description: INTERVENTIONS:  - Assess bowel function  - Maintain adequate hydration with IV or PO as ordered and tolerated  - Evaluate effectiveness of GI medications  - Encourage mobilization and activity  - Obtain nutritional consult as needed  - Establish a toileting routine/schedule  - Consider collaborating with pharmacy to review patient's medication profile  Outcome: Progressing

## 2025-06-04 NOTE — PROGRESS NOTES
Patient brought to bathroom because rectal tube seemed to be leaking. Patient needed to urinate and was left on the toilet for privacy. When tech arrived back to bathroom, rectal tube was inside of toilet and no longer connected to patient.

## 2025-06-05 VITALS
SYSTOLIC BLOOD PRESSURE: 117 MMHG | TEMPERATURE: 98 F | BODY MASS INDEX: 28 KG/M2 | WEIGHT: 180 LBS | DIASTOLIC BLOOD PRESSURE: 95 MMHG | RESPIRATION RATE: 18 BRPM | HEART RATE: 78 BPM | OXYGEN SATURATION: 95 %

## 2025-06-05 LAB
ANION GAP SERPL CALC-SCNC: 7 MMOL/L (ref 0–18)
BASOPHILS # BLD AUTO: 0.03 X10(3) UL (ref 0–0.2)
BASOPHILS NFR BLD AUTO: 0.5 %
BUN BLD-MCNC: 18 MG/DL (ref 9–23)
CALCIUM BLD-MCNC: 9 MG/DL (ref 8.7–10.6)
CHLORIDE SERPL-SCNC: 108 MMOL/L (ref 98–112)
CO2 SERPL-SCNC: 24 MMOL/L (ref 21–32)
CREAT BLD-MCNC: 0.92 MG/DL (ref 0.7–1.3)
EGFRCR SERPLBLD CKD-EPI 2021: 80 ML/MIN/1.73M2 (ref 60–?)
EOSINOPHIL # BLD AUTO: 0.06 X10(3) UL (ref 0–0.7)
EOSINOPHIL NFR BLD AUTO: 1 %
ERYTHROCYTE [DISTWIDTH] IN BLOOD BY AUTOMATED COUNT: 12.9 %
GLUCOSE BLD-MCNC: 95 MG/DL (ref 70–99)
HCT VFR BLD AUTO: 42.2 % (ref 39–53)
HGB BLD-MCNC: 14.4 G/DL (ref 13–17.5)
IMM GRANULOCYTES # BLD AUTO: 0.06 X10(3) UL (ref 0–1)
IMM GRANULOCYTES NFR BLD: 1 %
LYMPHOCYTES # BLD AUTO: 0.78 X10(3) UL (ref 1–4)
LYMPHOCYTES NFR BLD AUTO: 12.7 %
MAGNESIUM SERPL-MCNC: 2.1 MG/DL (ref 1.6–2.6)
MCH RBC QN AUTO: 31.9 PG (ref 26–34)
MCHC RBC AUTO-ENTMCNC: 34.1 G/DL (ref 31–37)
MCV RBC AUTO: 93.4 FL (ref 80–100)
MONOCYTES # BLD AUTO: 0.66 X10(3) UL (ref 0.1–1)
MONOCYTES NFR BLD AUTO: 10.8 %
NEUTROPHILS # BLD AUTO: 4.54 X10 (3) UL (ref 1.5–7.7)
NEUTROPHILS # BLD AUTO: 4.54 X10(3) UL (ref 1.5–7.7)
NEUTROPHILS NFR BLD AUTO: 74 %
OSMOLALITY SERPL CALC.SUM OF ELEC: 290 MOSM/KG (ref 275–295)
PLATELET # BLD AUTO: 223 10(3)UL (ref 150–450)
POTASSIUM SERPL-SCNC: 4 MMOL/L (ref 3.5–5.1)
RBC # BLD AUTO: 4.52 X10(6)UL (ref 3.8–5.8)
SODIUM SERPL-SCNC: 139 MMOL/L (ref 136–145)
WBC # BLD AUTO: 6.1 X10(3) UL (ref 4–11)

## 2025-06-05 PROCEDURE — 83735 ASSAY OF MAGNESIUM: CPT | Performed by: STUDENT IN AN ORGANIZED HEALTH CARE EDUCATION/TRAINING PROGRAM

## 2025-06-05 PROCEDURE — 97530 THERAPEUTIC ACTIVITIES: CPT

## 2025-06-05 PROCEDURE — 85025 COMPLETE CBC W/AUTO DIFF WBC: CPT | Performed by: STUDENT IN AN ORGANIZED HEALTH CARE EDUCATION/TRAINING PROGRAM

## 2025-06-05 PROCEDURE — 80048 BASIC METABOLIC PNL TOTAL CA: CPT | Performed by: STUDENT IN AN ORGANIZED HEALTH CARE EDUCATION/TRAINING PROGRAM

## 2025-06-05 PROCEDURE — 97116 GAIT TRAINING THERAPY: CPT

## 2025-06-05 NOTE — PROGRESS NOTES
[facility] Hospitalist Progress Note     PCP: Deirdre Roldan MD    SUBJECTIVE:  Still with significant stool output in FMS. Feels weak. Denies CP or dyspnea.     OBJECTIVE:  Temp:  [97.8 °F (36.6 °C)-98.6 °F (37 °C)] 98 °F (36.7 °C)  Pulse:  [62-75] 75  Resp:  [18-20] 18  BP: (108-142)/(51-69) 142/57  SpO2:  [94 %-97 %] 94 %    Intake/Output:    Intake/Output Summary (Last 24 hours) at 6/4/2025 2310  Last data filed at 6/4/2025 0544  Gross per 24 hour   Intake --   Output 300 ml   Net -300 ml       Last 3 Weights   06/02/25 1241 180 lb (81.6 kg)   06/01/25 1038 190 lb (86.2 kg)   04/04/25 1937 191 lb 2.2 oz (86.7 kg)   04/04/25 1933 191 lb 2.2 oz (86.7 kg)   04/04/25 1026 190 lb (86.2 kg)       Exam  Physical Exam:  General: Alert, awake, cooperative.  HEENT:  Normocephalic, atraumatic.  Neck:  Trachea midline.  No JVD.   Chest:  Clear to auscultation bilaterally. No wheezes, rales, or rhonchi.  CV:  Regular rate and rhythm.  Positive S1/S2. No murmur, no gallops, no rubs  GI: Bowel sounds present in all four quadrants, abdomen is soft, non-distended.  Extremities:  No lower extremity edema or cyanosis.  Neurological:  AAOx3.  Moving all extremities.  Skin:  Warm and dry.         Data Review:       Labs:     Recent Labs   Lab 06/01/25  1118 06/02/25  0835 06/03/25  0620 06/04/25  0647   WBC  --  6.7 5.5 5.6   HGB  --  16.6 15.0 14.1   MCV 95.3 94.3 92.6 92.8   PLT  --  235.0 226.0 212.0   BAND  --  30  --   --        Recent Labs   Lab 06/02/25  0835 06/03/25  0620 06/04/25  0647   * 135* 138   K 4.5 3.8 4.0  4.0    106 106   CO2 18.0* 22.0 23.0   BUN 30* 27* 19   CREATSERUM 1.50* 1.16 0.96   CA 9.8 9.0 8.7   MG  --  1.8 2.0   * 90 94       Recent Labs   Lab 06/02/25  0835   ALT 36   AST 32   ALB 4.5       No results for input(s): \"PGLU\" in the last 168 hours.    No results for input(s): \"TROP\" in the last 168 hours.        Meds:     Scheduled Medications[1]  Medication Infusions[2]  PRN  Medications[3]       Assessment/Plan:   89 year old male with PMH sig for CAD s/p PCI, HTN, HLD, JAMESON, TIA who presents for nausea and diarrhea.      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 Stool pathogen panel positive for EPEC and Campylobacter.  -3-day course p.o. azithromycin (6/2-) given high risk patient  -Continue IVF  -Antimemetics  -Advance diet as tolerated  -Trend renal function.  Avoid nephrotoxic agents.  - PT/OT  -Can consider removing rectal tube if output decreases later in day.      #GERD  - Continue PPI     #RLS   - Continue pramipexole     #CAD s/p PCI  #Hypertension  #Hyperlipidemia  #TIA, history of  -Continue aspirin, statin     #JAMESON  -CPAP qhs     DVT ppx: heparin subcu  Code Status: DNR/DNI    D/W TIBURCIO Haskins DO  [facility] Hospitalist         [1]    glycerin-hypromellose-  1 drop Both Eyes Nightly    aspirin  81 mg Oral Daily    sodium chloride  1,000 mL Intravenous Once    pantoprazole  40 mg Oral QAM AC    rosuvastatin  40 mg Oral Nightly    traZODone  50 mg Oral Nightly    pramipexole  0.5 mg Oral Nightly    heparin  5,000 Units Subcutaneous Q8H [facility]   [2]    lactated ringers 75 mL/hr at 06/03/25 2108   [3]   acetaminophen    ondansetron    metoclopramide

## 2025-06-05 NOTE — PLAN OF CARE
Pt is A&O x 4. [place]. VSS, afebrile. SPO2 maintained on RA. Tolerating regular diet. Voids. Up x1 with walker. no pain. PIV IVF. Patient updated on poc, no further needs at this time. Safety precautions in place.     Problem: Patient/Family Goals  Goal: Patient/Family Long Term Goal  Description: Patient's Long Term Goal: go home    Interventions:  - See additional Care Plan goals for specific interventions  Outcome: Progressing  Goal: Patient/Family Short Term Goal  Description: Patient's Short Term Goal: sleep, feel better  6/3 AM: Monitor stool  6/3 NOC: sleep, rest, monitor stool  6/4 NOC: rest, sleep, monitor BM    Interventions:  - See additional Care Plan goals for specific interventions  Outcome: Progressing     Problem: GASTROINTESTINAL - ADULT  Goal: Maintains or returns to baseline bowel function  Description: INTERVENTIONS:  - Assess bowel function  - Maintain adequate hydration with IV or PO as ordered and tolerated  - Evaluate effectiveness of GI medications  - Encourage mobilization and activity  - Obtain nutritional consult as needed  - Establish a toileting routine/schedule  - Consider collaborating with pharmacy to review patient's medication profile  Outcome: Progressing

## 2025-06-05 NOTE — PLAN OF CARE
Pt is a&ox4. [place]. RA. NSR on tele. PO abx. Heparin. IVF's. Up x1 w/ walker. Tolerating diet. Pt and wife updated on poc. No futher needs at this time.    Problem: Patient/Family Goals  Goal: Patient/Family Long Term Goal  Description: Patient's Long Term Goal: go home    Interventions:  - See additional Care Plan goals for specific interventions  Outcome: Progressing  Goal: Patient/Family Short Term Goal  Description: Patient's Short Term Goal: sleep, feel better  6/3 AM: Monitor stool  6/3 NOC: sleep, rest, monitor stool  6/4 NOC: rest, sleep, monitor BM    Interventions:  - See additional Care Plan goals for specific interventions  Outcome: Progressing

## 2025-06-05 NOTE — PROGRESS NOTES
[facility] Hospitalist Progress Note       SUBJECTIVE:  ***    OBJECTIVE:  Scheduled Meds: Scheduled Medications[1]  Continuous Infusions: Medication Infusions[2]  PRN Meds: PRN Medications[3]    Vitals  @3VITALS@     Exam   Gen-    no acute distress, alert and oriented x 3 ***  RESP-   Lungs CTA***, normal respiratory effort  CV-      Heart RRR, no mgr  Abd-    soft, nondistended, nontender, bowel sounds present  Skin-   *** no rash  Neuro-  no focal neurologic deficits  Ext-      No edema in extremities   Psych- alert and oriented x 3 ***    Labs:     Recent Labs   Lab 06/01/25  1118 06/02/25  0835 06/03/25  0620 06/04/25  0647 06/05/25  0638   WBC  --  6.7 5.5 5.6 6.1   HGB  --  16.6 15.0 14.1 14.4   MCV 95.3 94.3 92.6 92.8 93.4   PLT  --  235.0 226.0 212.0 223.0   BAND  --  30  --   --   --        Recent Labs   Lab 06/02/25  0835 06/03/25  0620 06/04/25  0647 06/05/25  0638   * 135* 138 139   K 4.5 3.8 4.0  4.0 4.0    106 106 108   CO2 18.0* 22.0 23.0 24.0   BUN 30* 27* 19 18   CREATSERUM 1.50* 1.16 0.96 0.92   CA 9.8 9.0 8.7 9.0   MG  --  1.8 2.0 2.1   * 90 94 95       Recent Labs   Lab 06/02/25  0835   ALT 36   AST 32   ALB 4.5       No results for input(s): \"PGLU\" in the last 168 hours.    AP:        FEN  Fluids: ***  Electrolytes: ***  Diet: ***    Prophylaxis:  DVT:         Please page me or the on-call hospitalist with questions or concerns:      Criselda Sanders MD   [facility] [place]  [phone] (Pager)  [phone] (Answering Service)       [1]    glycerin-hypromellose-  1 drop Both Eyes Nightly    aspirin  81 mg Oral Daily    sodium chloride  1,000 mL Intravenous Once    pantoprazole  40 mg Oral QAM AC    rosuvastatin  40 mg Oral Nightly    traZODone  50 mg Oral Nightly    pramipexole  0.5 mg Oral Nightly    heparin  5,000 Units Subcutaneous Q8H [facility]   [2]    lactated ringers 75 mL/hr at 06/05/25 0559   [3]   acetaminophen    ondansetron    metoclopramide

## 2025-06-05 NOTE — PHYSICAL THERAPY NOTE
PHYSICAL THERAPY TREATMENT NOTE - INPATIENT    Room Number: 510/510-A     Session: 1     Number of Visits to Meet Established Goals: 4    Presenting Problem: gastroenteritis, diarrhea  Co-Morbidities : HTN, TIA, RLS,hx of rib fractures, vertigo, spine surgery    PHYSICAL THERAPY ASSESSMENT   Patient demonstrates good  progress this session, goals  progressing with 3/3 met this session.    Patient is amb with sup with RW. Pt is educated to amb with the nursing staff.   Patient continues to benefit from continued skilled PT services: with no additional skilled services but increased support at home.    PLAN DURING HOSPITALIZATION  Nursing Mobility Recommendation : 1 Assist  PT Device Recommendation: Rolling walker  PT Treatment Plan: Bed mobility, Endurance, Energy conservation, Patient education, Family education, Gait training, Strengthening, Transfer training, Balance training  Frequency (Obs): 3-5x/week     CURRENT GOALS     Goal #1 Patient is able to demonstrate supine - sit EOB @ level: supervision  met   Goal #2 Patient is able to demonstrate transfers Sit to/from Stand at assistance level: supervision  met   Goal #3 Patient is able to ambulate 150 feet with assist device: walker - rolling at assistance level: supervision  met   Goal #4    Goal #5    Goal #6    Goal Comments: Goals established on 6/3/2025    2025 all goals met    SUBJECTIVE  \" I am fine.\"    OBJECTIVE  Precautions: Bed/chair alarm    WEIGHT BEARING RESTRICTION     PAIN ASSESSMENT   Ratin  Location: denied pain  Management Techniques: Activity promotion, Relaxation, Repositioning    BALANCE                                                                                                                       Static Sitting: Fair +  Dynamic Sitting: Fair +           Static Standing: Fair -  Dynamic Standing: Fair -    ACTIVITY TOLERANCE                         O2 WALK       AM-PAC '6-Clicks' INPATIENT SHORT FORM - BASIC MOBILITY  How much  difficulty does the patient currently have...  Patient Difficulty: Turning over in bed (including adjusting bedclothes, sheets and blankets)?: None   Patient Difficulty: Sitting down on and standing up from a chair with arms (e.g., wheelchair, bedside commode, etc.): None   Patient Difficulty: Moving from lying on back to sitting on the side of the bed?: None   How much help from another person does the patient currently need...   Help from Another: Moving to and from a bed to a chair (including a wheelchair)?: None   Help from Another: Need to walk in hospital room?: A Little   Help from Another: Climbing 3-5 steps with a railing?: A Little     AM-PAC Score:  Raw Score: 22   Approx Degree of Impairment: 20.91%   Standardized Score (AM-PAC Scale): 53.28   CMS Modifier (G-Code): CJ    FUNCTIONAL ABILITY STATUS  Gait Assessment   Functional Mobility/Gait Assessment  Gait Assistance: Supervision  Distance (ft): 300  Assistive Device: Rolling walker  Pattern: Within Functional Limits    Skilled Therapy Provided    Bed Mobility:  Rolling: ind   Supine<>Sit: Ind   Sit<>Supine: Ind     Transfer Mobility:  Sit<>Stand: ind   Stand<>Sit: Ind   Gait: sup with RW. Patient was offered to try to amb with one arm assist but pt declined states he prefers to use the rolling walker in the hallway.     Therapist's Comments: No LOB during gait or transfers. Patient denied dizziness. Performed standing marching, hip abd, knee flexion and sit to stand x 5.       Patient End of Session: Up in chair, Needs met, Call light within reach, RN aware of session/findings, All patient questions and concerns addressed, Alarm set    PT Session Time: 23 minutes  Gait Training: 10 minutes  Therapeutic Activity: 13 minutes

## 2025-06-06 NOTE — PAYOR COMM NOTE
--------------  DISCHARGE REVIEW    Payor: [employer]  Subscriber #:  [id number]  Authorization Number: [id number]    Admit date: N/A  Admit time:  N/A  Discharge Date: 6/5/2025  5:26 PM     Admitting Physician: Melchor Haskins DO  Attending Physician:  No att. providers found  Primary Care Physician: Deirdre Roldan MD       Admit Orders (From admission, onward)       Start     Ordered    06/02/25 1019  Place in observation Once  (Place Observation TELE Medicine)  Once,   Status:  Canceled        Ordering Provider: Gutierrez Malhotra MD   Question:  Diagnosis  Answer:  SUN (acute kidney injury)    06/02/25 1018

## 2025-06-11 NOTE — DISCHARGE SUMMARY
[facility] Internal Medicine Hospitalist Discharge Summary     Patient ID:  Maria Elena Moon  89 year old  3/23/1935    Admission Date/Time  6/2/2025  8:29 AM  Discharge Date   6/5/25    PCP  Deirdre Roldan MD     Discharging Hospitalist:  Criselda Sanders MD    Disposition:  Home     Follow Up Appointments  Deirdre Roldan MD  [address]  [address]  [place] [address]  [phone]    Follow up        Primary Hospital Problems/Hospital Course Summary  Diaarrhea     Medication Changes  None     Important Follow Up Items  Labs: non   Incidental Findings: none     Procedures/Diagnostics  none    Operative Procedures:   None     Change in Code Status: no      Hospital Course/Secondary Diagnoses:      89 year old male with PMH sig for CAD s/p PCI, HTN, HLD, JAMESON, TIA who presents for nausea and diarrhea.      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 Stool pathogen panel positive for EPEC and Campylobacter.  -3-day course p.o. azithromycin (6/2-) given high risk patient  -Continue IVF  -Antimemetics  -Advance diet as tolerated  -Trend renal function.  Avoid nephrotoxic agents.  - PT/OT  -Can consider removing rectal tube if output decreases later in day.      #GERD  - Continue PPI     #RLS   - Continue pramipexole     #CAD s/p PCI  #Hypertension  #Hyperlipidemia  #TIA, history of  -Continue aspirin, statin     #JAMESON  -CPAP qhs    Consults: None    Discharge Medications       Medication List        CONTINUE taking these medications      aspirin 81 MG Tbec     Esomeprazole Magnesium 40 MG Cpdr  Commonly known as: NEXIUM     METAMUCIL OR     pramipexole 0.5 MG Tabs  Commonly known as: Mirapex     PRESERVISION AREDS OR     rosuvastatin 40 MG Tabs  Commonly known as: Crestor     traZODone 50 MG Tabs  Commonly known as: Desyrel     VITAMIN B COMPLEX OR      Vitamin D3 25 MCG (1000 UT) Caps            ASK your doctor about these medications      ondansetron 4 MG Tbdp  Commonly known as: Zofran-ODT  Take 1 tablet (4 mg total) by mouth every 4 (four) hours as needed for Nausea.  Ask about: Should I take this medication?            I reconciled current and discharge medications on the day of discharge.    Imaging/Diagnostic Reports  No results found.      Patient instructions:      I as the attending physician reconciled the current and discharge medications on day of discharge.     Discharge Medication List as of 6/5/2025  4:21 PM        CONTINUE these medications which have NOT CHANGED    Details   PRESERVISION AREDS OR Take 1 tablet by mouth in the morning., Historical, DAMARIS      pramipexole 0.5 MG Oral Tab Take 1 tablet (0.5 mg total) by mouth at bedtime., Historical      aspirin 81 MG Oral Tab EC Take 1 tablet (81 mg total) by mouth in the morning., Historical      Esomeprazole Magnesium 40 MG Oral Capsule Delayed Release Take 1 capsule (40 mg total) by mouth every morning before breakfast., Historical      ondansetron 4 MG Oral Tablet Dispersible Take 1 tablet (4 mg total) by mouth every 4 (four) hours as needed for Nausea., Normal, Disp-10 tablet, R-0      rosuvastatin 40 MG Oral Tab Take 1 tablet (40 mg total) by mouth nightly., Historical      traZODone 50 MG Oral Tab Take 1 tablet (50 mg total) by mouth nightly., Historical      Cholecalciferol (VITAMIN D3) 25 MCG (1000 UT) Oral Cap Take 1 tablet by mouth in the morning., Historical, Disp-30 capsule, R-0      Psyllium (METAMUCIL OR) Take 1 Scoop by mouth in the morning., Historical      B Complex Vitamins (VITAMIN B COMPLEX OR) Take 1 tablet by mouth in the morning., Historical             Activity: activity as tolerated  Diet: cardiac diet      Exam on day of discharge:     Vitals:    06/05/25 1600   BP: (!) 117/95   Pulse: 78   Resp: 18   Temp: 97.6 °F (36.4 °C)       Physical Exam:   General: no acute  distress  Heart: RRR  Lungs: CTAB  Abd: Soft, NT, ND  Neuro: no focal deficits      Total time coordinating care for discharge: Greater than 30 minutes    Criselda Sanders MD

## 2025-07-08 ENCOUNTER — HOSPITAL ENCOUNTER (EMERGENCY)
Facility: HOSPITAL | Age: 89
Discharge: LEFT BEFORE DISCHARGE | End: 2025-07-08
Payer: MEDICARE

## 2025-07-08 NOTE — ED INITIAL ASSESSMENT (HPI)
Pt presents to ER with blurry vision that started when he woke up today. L hemnyonopsia noted at clinic symmetrical in both eyes. Being sent for evaluation of possible stroke or TIA. Pt has history of 3 TIA's, last was about 25 years ago per patient's report. Pt's eyes are dilated at this time.

## 2025-07-08 NOTE — ED QUICK NOTES
Pt verbalized that he feels fine and was told the risks of leaving but pt did not want to wait for full evaluation. Pt did express understanding that if symptoms returned that he should come back immediately to ER for further testing. Pt left before assessment was completed.

## 2025-07-08 NOTE — Clinical Note
Date:7/8/2025  Patient:Maria Elena Moon  Attending Provider:No att. providers found    Maria Elena Moon made the decision to leave the emergency department independently without the approval of the ED Treatment Team: Registered Nurse: Marylin Don RN, or other emergency department staff.

## (undated) DEVICE — 1200CC GUARDIAN II: Brand: GUARDIAN

## (undated) DEVICE — CHLORAPREP 26ML APPLICATOR

## (undated) DEVICE — BLADE ELECTROSURG 4IN INSULATE

## (undated) DEVICE — BOWLS UTILITY 16OZ

## (undated) DEVICE — MICRO KOVER: Brand: UNBRANDED

## (undated) DEVICE — Device

## (undated) DEVICE — WRAP COOLING BACK W/NO PILLOW

## (undated) DEVICE — SINGLE USE DISTAL COVER MAJ-2315: Brand: SINGLE USE DISTAL COVER

## (undated) DEVICE — GOWN SURG AERO CHROME XXL

## (undated) DEVICE — KIT ENDO ORCAPOD 160/180/190

## (undated) DEVICE — LAMINECTOMY CDS: Brand: MEDLINE INDUSTRIES, INC.

## (undated) DEVICE — EXOFIN TISSUE ADHESIVE 1.0ML

## (undated) DEVICE — SOL  .9 1000ML BTL

## (undated) DEVICE — PROXIMATE SKIN STAPLERS (35 WIDE) CONTAINS 35 STAINLESS STEEL STAPLES (FIXED HEAD): Brand: PROXIMATE

## (undated) DEVICE — BIOGUARD CLEANING ADAPTER

## (undated) DEVICE — CAUTERY BLADE 2IN INS E1455

## (undated) DEVICE — SUTURE VICRYL 0 UR-6

## (undated) DEVICE — 3M™ IOBAN™ 2 ANTIMICROBIAL INCISE DRAPE 6650EZ: Brand: IOBAN™ 2

## (undated) DEVICE — BLOCK BITE MAXI 60FR

## (undated) DEVICE — 3.0MM PRECISION NEURO (MATCH HEAD)

## (undated) DEVICE — COVER,MAYO STAND,STERILE: Brand: MEDLINE

## (undated) DEVICE — FLOSEAL HEMOSTATIC MATRIX, 5ML: Brand: FLOSEAL HEMOSTATIC MATRIX

## (undated) DEVICE — SYRINGE 10ML LL TIP

## (undated) DEVICE — SUTURE MONOCRYL 3-0 PS-2

## (undated) DEVICE — 10FT COMBINED O2 DELIVERY/CO2 MONITORING. FILTER WITH MICROSTREAM TYPE LUER: Brand: DUAL ADULT NASAL CANNULA

## (undated) DEVICE — C-ARM: Brand: UNBRANDED

## (undated) DEVICE — STERILE POLYISOPRENE POWDER-FREE SURGICAL GLOVES: Brand: PROTEXIS

## (undated) DEVICE — SUTURE VICRYL 2-0 CP-2

## (undated) DEVICE — ENDOSCOPY PACK - LOWER: Brand: MEDLINE INDUSTRIES, INC.

## (undated) DEVICE — SCD SLEEVE KNEE HI BLEND

## (undated) DEVICE — 3M™ RED DOT™ MONITORING ELECTRODE WITH FOAM TAPE AND STICKY GEL, 50/BAG, 20/CASE, 72/PLT 2570: Brand: RED DOT™

## (undated) DEVICE — Device: Brand: INTELLICART™

## (undated) NOTE — ED AVS SNAPSHOT
[facility] Emergency Department    [facility]  [address] [place] [place] [address]    Phone:  [phone]    Fax:  [phone]           Diane Alba   MRN: [id number]    Department:  [facility] Emergency Department   Date of Visit:  2/21 self-assessment the day after your visit. You may also receive a call from our patient liason soon after your visit. Also, some patients receive a detailed feedback survey mailed to them a week after the visit.   If you receive this, we would really apprec [facility] [address] [facility] [address] [facility] [id number] [address] ([address]) [facility] [address] [facility]. ([address]) [place] Call PROCEDURE:  XR FINGER(S) (MIN 2 VIEWS), RIGHT 4TH (CPT=73140)     INDICATIONS:  broken finger     COMPARISON:  None. TECHNIQUE:  Three views of the finger were obtained.      PATIENT STATED HISTORY:  Patient was walking the dog on Sunday and got his ri

## (undated) NOTE — LETTER
[address]  [address] [facility] [address], [address] [facility]  Authorization for Surgical Operation and Procedure     Date:___________                                                                                                         Time:__________ reactions, hemolytic reactions, transmission of diseases such as Hepatitis, AIDS and Cytomegalovirus (CMV) and fluid overload. In the event that I wish to have an autologous transfusion of my own blood, or a directed donor transfusion.   I will discuss thi ends for purposes of reinstating the DNAR order.   10. Patients having a sterilization procedure: I understand that if the procedure is successful the results will be permanent and it will therefore be impossible for me to inseminate, conceive, or bear chil additional procedures as necessary. Some examples are: Starting or using an “IV” to give me medicine, fluids or blood during my procedure, and having a breathing tube placed to help me breathe when I’m asleep (intubation).  In the event that my heart stops complications include headache, bleeding, infection, seizure, irregular heart rhythms, and nerve injury.     I can change my mind about having anesthesia services at any time before I get the medicine.    ____________________________________________________

## (undated) NOTE — LETTER
[address]  [address] [facility] [address], [address] [facility]  Authorization for Surgical Operation and Procedure     Date:___________                                                                                                         Time:__________ that can occur: fever and allergic reactions, hemolytic reactions, transmission of diseases such as Hepatitis, AIDS and Cytomegalovirus (CMV) and fluid overload.   In the event that I wish to have an autologous transfusion of my own blood, or a directed don when the applicable recovery period ends for purposes of reinstating the DNAR order.   10. Patients having a sterilization procedure: I understand that if the procedure is successful the results will be permanent and it will therefore be impossible for me t to give me medicine and do additional procedures as necessary. Some examples are: Starting or using an “IV” to give me medicine, fluids or blood during my procedure, and having a breathing tube placed to help me breathe when I’m asleep (intubation).  In the that rare but potential complications include headache, bleeding, infection, seizure, irregular heart rhythms, and nerve injury.     I can change my mind about having anesthesia services at any time before I get the medicine.    ____________________________

## (undated) NOTE — LETTER
[address]  [address] [facility] [address], [address] [facility]  Authorization for Surgical Operation and Procedure     Date:___________                                                                                                         Time:__________ reactions, hemolytic reactions, transmission of diseases such as Hepatitis, AIDS and Cytomegalovirus (CMV) and fluid overload. In the event that I wish to have an autologous transfusion of my own blood, or a directed donor transfusion.   I will discuss thi ends for purposes of reinstating the DNAR order.   10. Patients having a sterilization procedure: I understand that if the procedure is successful the results will be permanent and it will therefore be impossible for me to inseminate, conceive, or bear chil additional procedures as necessary. Some examples are: Starting or using an “IV” to give me medicine, fluids or blood during my procedure, and having a breathing tube placed to help me breathe when I’m asleep (intubation).  In the event that my heart stops complications include headache, bleeding, infection, seizure, irregular heart rhythms, and nerve injury.     I can change my mind about having anesthesia services at any time before I get the medicine.    ____________________________________________________

## (undated) NOTE — ED AVS SNAPSHOT
[facility] Emergency Department    [place]  [address] [place] [address]    Phone:  [phone]    Fax:  [phone]           Cely Vianney   MRN: [id number]    Department:  [facility] Emergency Department   Date of Visit:  2/21 IF THERE IS ANY CHANGE OR WORSENING OF YOUR CONDITION, CALL YOUR PRIMARY CARE PHYSICIAN AT ONCE OR RETURN IMMEDIATELY TO THE EMERGENCY DEPARTMENT.     If you have been prescribed any medication(s), please fill your prescription right away and begin taking t

## (undated) NOTE — LETTER
[address]  [address] [facility] [address], [address] [facility]  Authorization for Surgical Operation and Procedure     Date:______8/12/2020_____                                                                                                         Time:___ and/or blood products. The following are some, but not all, of the potential risks that can occur: fever and allergic reactions, hemolytic reactions, transmission of diseases such as Hepatitis, AIDS and Cytomegalovirus (CMV) and fluid overload.   In the ev (or a person authorized to consent on my behalf). The surgeon or my attending physician will determine when the applicable recovery period ends for purposes of reinstating the DNAR order.   10. Patients having a sterilization procedure: I understand that if 2. As the patient asking for anesthesia services, I agree to:  a. Allow the anesthesiologist (anesthesia doctor) to give me medicine and do additional procedures as necessary.  Some examples are: Starting or using an “IV” to give me medicine, fluids or bloo Very rare risks include infection, bleeding, seizure, irregular heart rhythms and nerve injury. 7. Regional Anesthesia (“spinal”, “epidural”, & “nerve blocks”):   I understand that rare but potential complications include headache, bleeding, infection, sei

## (undated) NOTE — LETTER
[address]  [address] [facility] [address], [address] [facility]  Authorization for Surgical Operation and Procedure     Date:___________                                                                                                         Time:__________ reactions, hemolytic reactions, transmission of diseases such as Hepatitis, AIDS and Cytomegalovirus (CMV) and fluid overload. In the event that I wish to have an autologous transfusion of my own blood, or a directed donor transfusion.   I will discuss thi ends for purposes of reinstating the DNAR order.   10. Patients having a sterilization procedure: I understand that if the procedure is successful the results will be permanent and it will therefore be impossible for me to inseminate, conceive, or bear chil additional procedures as necessary. Some examples are: Starting or using an “IV” to give me medicine, fluids or blood during my procedure, and having a breathing tube placed to help me breathe when I’m asleep (intubation).  In the event that my heart stops complications include headache, bleeding, infection, seizure, irregular heart rhythms, and nerve injury.     I can change my mind about having anesthesia services at any time before I get the medicine.    ____________________________________________________